# Patient Record
Sex: FEMALE | Race: WHITE | Employment: OTHER | URBAN - METROPOLITAN AREA
[De-identification: names, ages, dates, MRNs, and addresses within clinical notes are randomized per-mention and may not be internally consistent; named-entity substitution may affect disease eponyms.]

---

## 2017-01-06 ENCOUNTER — PATIENT MESSAGE (OUTPATIENT)
Dept: FAMILY MEDICINE CLINIC | Facility: CLINIC | Age: 77
End: 2017-01-06

## 2017-01-09 ENCOUNTER — TELEPHONE (OUTPATIENT)
Dept: FAMILY MEDICINE CLINIC | Facility: CLINIC | Age: 77
End: 2017-01-09

## 2017-01-09 NOTE — TELEPHONE ENCOUNTER
From: Melody Cherylene Mis  To: Jeronimo Bro MD  Sent: 1/6/2017 8:22 PM CST  Subject: Test Results Question    I really don't understand the recent Thyroid Peroxidase and Thyroglobulin Antibodies test. Would like someone to explain to me.  Do I need to se

## 2017-01-23 RX ORDER — LEVOTHYROXINE SODIUM 112 UG/1
TABLET ORAL
Qty: 90 TABLET | Refills: 1 | Status: CANCELLED | OUTPATIENT
Start: 2017-01-23

## 2017-01-23 NOTE — TELEPHONE ENCOUNTER
Thyroid Supplements Protocol Failed1/21 10:26 AM   TSH value between 0.350 and 5.500 IU/ml     Requesting Levothyroxine   LOV: 12/23/16  RTC: 6m   Last Labs: 12/31/16  Filled: 10/28/16 #90 with 0 refills    Future Appointments  Date Time Provider Allen Monte

## 2017-01-24 ENCOUNTER — NURSE ONLY (OUTPATIENT)
Dept: FAMILY MEDICINE CLINIC | Facility: CLINIC | Age: 77
End: 2017-01-24

## 2017-01-24 PROCEDURE — 96372 THER/PROPH/DIAG INJ SC/IM: CPT | Performed by: INTERNAL MEDICINE

## 2017-01-24 RX ORDER — CYANOCOBALAMIN 1000 UG/ML
1000 INJECTION INTRAMUSCULAR; SUBCUTANEOUS ONCE
Status: COMPLETED | OUTPATIENT
Start: 2017-01-24 | End: 2017-01-24

## 2017-01-24 RX ORDER — LEVOTHYROXINE SODIUM 112 UG/1
112 TABLET ORAL
Qty: 90 TABLET | Refills: 1 | Status: CANCELLED | OUTPATIENT
Start: 2017-01-24

## 2017-01-24 RX ADMIN — CYANOCOBALAMIN 1000 MCG: 1000 INJECTION INTRAMUSCULAR; SUBCUTANEOUS at 10:04:00

## 2017-01-24 NOTE — PROGRESS NOTES
4. Low vitamin B12 level, with fatigue, will start monthly B12 shots for 6 months    Received injection #1 on 12/23/16 at [address]  Scheduled for 2nd injection today    Future Appointments  Date Time Provider Anabell Shukla   1/24/2017 10:00 AM [facility] 20 NURSE E

## 2017-01-24 NOTE — PROGRESS NOTES
Patient is here for Vitamin B12 #2. Given IM to left deltoid without incident. Patient scheduled next appointment.

## 2017-01-27 RX ORDER — LEVOTHYROXINE SODIUM 112 UG/1
112 TABLET ORAL
Qty: 90 TABLET | Refills: 0 | Status: SHIPPED | OUTPATIENT
Start: 2017-01-27 | End: 2017-04-17

## 2017-02-23 NOTE — TELEPHONE ENCOUNTER
Requesting duloxetine  LOV: 12/23/16  RTC: 6 months  Last Labs: n/a  Filled: 6/9/16 #90 with 0 refills    Future Appointments  Date Time Provider Anabell Shukla   2/24/2017 10:00 AM [facility] 20 NURSE [facility] 20 [facility] [address]   6/23/2017 10:00 AM Marco Reno MD

## 2017-02-24 ENCOUNTER — NURSE ONLY (OUTPATIENT)
Dept: FAMILY MEDICINE CLINIC | Facility: CLINIC | Age: 77
End: 2017-02-24

## 2017-02-24 PROCEDURE — 96372 THER/PROPH/DIAG INJ SC/IM: CPT | Performed by: INTERNAL MEDICINE

## 2017-02-24 RX ORDER — CYANOCOBALAMIN 1000 UG/ML
1000 INJECTION INTRAMUSCULAR; SUBCUTANEOUS
Status: COMPLETED | OUTPATIENT
Start: 2017-02-24 | End: 2017-05-24

## 2017-02-24 RX ADMIN — CYANOCOBALAMIN 1000 MCG: 1000 INJECTION INTRAMUSCULAR; SUBCUTANEOUS at 10:16:00

## 2017-02-24 NOTE — PROGRESS NOTES
Nai Mcgowan RN at 1/24/2017  8:22 AM      Status: Signed : Nai Mcgowan RN (Registered Nurse)     Expand All Collapse All    4.  Low vitamin B12 level, with fatigue, will start monthly B12 shots for 6 months    Received injection #1 on 12/23/16

## 2017-03-01 ENCOUNTER — TELEPHONE (OUTPATIENT)
Dept: FAMILY MEDICINE CLINIC | Facility: CLINIC | Age: 77
End: 2017-03-01

## 2017-03-01 RX ORDER — DULOXETIN HYDROCHLORIDE 60 MG/1
CAPSULE, DELAYED RELEASE ORAL
Qty: 90 CAPSULE | Refills: 1 | Status: SHIPPED | OUTPATIENT
Start: 2017-03-01 | End: 2017-06-21

## 2017-03-01 NOTE — TELEPHONE ENCOUNTER
Singulex results dated 12/17/16. Low vitamin D level at 23. Start 50,000 units once a week x 6 months.   Future Appointments  Date Time Provider Anabell Shukla   3/24/2017 10:30 AM [facility] 20 NURSE EM[facility] 20 [facility] [address]   6/21/2017 10:40 AM Nathan Mckeon

## 2017-03-24 ENCOUNTER — NURSE ONLY (OUTPATIENT)
Dept: FAMILY MEDICINE CLINIC | Facility: CLINIC | Age: 77
End: 2017-03-24

## 2017-03-24 DIAGNOSIS — E53.8 LOW VITAMIN B12 LEVEL: Primary | ICD-10-CM

## 2017-03-24 PROCEDURE — 96372 THER/PROPH/DIAG INJ SC/IM: CPT | Performed by: INTERNAL MEDICINE

## 2017-03-24 RX ADMIN — CYANOCOBALAMIN 1000 MCG: 1000 INJECTION INTRAMUSCULAR; SUBCUTANEOUS at 10:25:00

## 2017-03-24 NOTE — PROGRESS NOTES
Progress Notes      Nai Galdamez RN at 2/24/2017  7:29 AM      Status: Signed : Deon Mejía RN (Registered Nurse)     Expand All Collapse All    Nai Galdamez RN at 1/24/2017  8:22 AM        Status: Signed  : Nai Galdamez RN (Regist

## 2017-04-17 RX ORDER — ATORVASTATIN CALCIUM 40 MG/1
TABLET, FILM COATED ORAL
Qty: 30 TABLET | Refills: 2 | Status: SHIPPED | OUTPATIENT
Start: 2017-04-17 | End: 2017-07-14

## 2017-04-17 NOTE — TELEPHONE ENCOUNTER
Cholesterol Medication Protocol Failed4/15 3:30 AM   Lipid panel within past 6 months     Requesting Atorvastatin   LOV: 12/23/16  RTC: 6 months   Last Labs: 12/19/16 singulex per media  Filled: 10/8/16 #90 with 1 refills    Future Appointments  Date Time

## 2017-04-18 RX ORDER — LEVOTHYROXINE SODIUM 112 UG/1
TABLET ORAL
Qty: 90 TABLET | Refills: 1 | Status: SHIPPED | OUTPATIENT
Start: 2017-04-18 | End: 2017-10-14

## 2017-04-18 NOTE — TELEPHONE ENCOUNTER
Requesting Levothyroxine  LOV: 12/23/16  RTC: 6 months  Last Labs: 12/17/16   Ref Range 12/17/16 10:11 AM       TSH 0.350-5.500 mIU/mL 0.087 (L)              Filled: 1/27/17 #90 with 0 refills    Future Appointments  Date Time Provider Anabell Shukla

## 2017-04-19 ENCOUNTER — PATIENT MESSAGE (OUTPATIENT)
Dept: FAMILY MEDICINE CLINIC | Facility: CLINIC | Age: 77
End: 2017-04-19

## 2017-04-19 RX ORDER — ATORVASTATIN CALCIUM 40 MG/1
TABLET, FILM COATED ORAL
Qty: 30 TABLET | Refills: 0 | OUTPATIENT
Start: 2017-04-19

## 2017-04-19 NOTE — PROGRESS NOTES
Status: Signed : Kayleen Marsh RN (Registered Nurse)     Expand All Collapse All    Progress Notes     Haylee Rodríguez April, RN at 2/24/2017  7:29 AM        Status: Signed  : Gavin January, April, RN (Registered Thuy Juan Expand All Collapse All

## 2017-04-19 NOTE — TELEPHONE ENCOUNTER
Requesting Atorvastatin  LOV: 12/23/16  RTC: 6 months  Last Labs: 12/17/16 singulex and cmp  Filled: 4/17/17 #30 with 2 refills    Future Appointments  Date Time Provider Anabell Shukla   4/24/2017 3:30 PM [facility] 20 NURSE EMG 20 [facility] [address]   6/21/2017 10

## 2017-04-20 NOTE — TELEPHONE ENCOUNTER
From: Sofiya Etienne Ours  To: Abel Rangel MD  Sent: 4/19/2017 6:13 PM CDT  Subject: Prescription Question    I don't know when Vishal Browne requested this prescription to be renewed. I haven't called yet. I have 4 pills left.  Please have Dr. John Santoyo to sign

## 2017-04-24 ENCOUNTER — NURSE ONLY (OUTPATIENT)
Dept: FAMILY MEDICINE CLINIC | Facility: CLINIC | Age: 77
End: 2017-04-24

## 2017-04-24 DIAGNOSIS — E53.8 LOW VITAMIN B12 LEVEL: Primary | ICD-10-CM

## 2017-04-24 PROCEDURE — 96372 THER/PROPH/DIAG INJ SC/IM: CPT | Performed by: INTERNAL MEDICINE

## 2017-04-24 RX ADMIN — CYANOCOBALAMIN 1000 MCG: 1000 INJECTION INTRAMUSCULAR; SUBCUTANEOUS at 15:10:00

## 2017-04-24 NOTE — PROGRESS NOTES
Patient is here for B12 injection. Given IM right deltoid. Patient tolerated well. All questions answered.

## 2017-05-24 ENCOUNTER — NURSE ONLY (OUTPATIENT)
Dept: FAMILY MEDICINE CLINIC | Facility: CLINIC | Age: 77
End: 2017-05-24

## 2017-05-24 DIAGNOSIS — E53.8 LOW VITAMIN B12 LEVEL: Primary | ICD-10-CM

## 2017-05-24 PROCEDURE — 96372 THER/PROPH/DIAG INJ SC/IM: CPT | Performed by: INTERNAL MEDICINE

## 2017-05-24 RX ADMIN — CYANOCOBALAMIN 1000 MCG: 1000 INJECTION INTRAMUSCULAR; SUBCUTANEOUS at 12:06:00

## 2017-05-24 NOTE — PROGRESS NOTES
Nai Gonzalez RN at 2/24/2017  7:29 AM          Status: Signed   : Nai Gonzalez RN (Registered Nurse)         Expand All Collapse Nai Lacey RN at 1/24/2017  8:22 AM            Status: Signed    : Nabil Luther RN (Registered

## 2017-05-24 NOTE — PROGRESS NOTES
Patient is here for B12 injection #6. Given IM right deltoid. Patient tolerated well. All questions answered.

## 2017-06-01 ENCOUNTER — MED REC SCAN ONLY (OUTPATIENT)
Dept: FAMILY MEDICINE CLINIC | Facility: CLINIC | Age: 77
End: 2017-06-01

## 2017-06-02 RX ORDER — AMLODIPINE BESYLATE 5 MG/1
TABLET ORAL
Qty: 90 TABLET | Refills: 0 | Status: SHIPPED | OUTPATIENT
Start: 2017-06-02 | End: 2017-09-10

## 2017-06-02 NOTE — TELEPHONE ENCOUNTER
Requesting Amlodipine   LOV: 12/23/16  RTC: 6m  Last Labs: pp  Filled: 12/6/16 #90 with 1 refills    Future Appointments  Date Time Provider Anabell Shukla   6/21/2017 10:40 AM Katy Cortes MD [facility] [address] [facility] [address]       Med refilled per protocol

## 2017-06-21 ENCOUNTER — LAB ENCOUNTER (OUTPATIENT)
Dept: LAB | Age: 77
End: 2017-06-21
Attending: INTERNAL MEDICINE
Payer: MEDICARE

## 2017-06-21 ENCOUNTER — PRIOR ORIGINAL RECORDS (OUTPATIENT)
Dept: OTHER | Age: 77
End: 2017-06-21

## 2017-06-21 ENCOUNTER — OFFICE VISIT (OUTPATIENT)
Dept: FAMILY MEDICINE CLINIC | Facility: CLINIC | Age: 77
End: 2017-06-21

## 2017-06-21 VITALS
HEIGHT: 62 IN | DIASTOLIC BLOOD PRESSURE: 80 MMHG | BODY MASS INDEX: 31.1 KG/M2 | WEIGHT: 169 LBS | SYSTOLIC BLOOD PRESSURE: 122 MMHG | RESPIRATION RATE: 16 BRPM | HEART RATE: 78 BPM

## 2017-06-21 DIAGNOSIS — E55.9 VITAMIN D DEFICIENCY: ICD-10-CM

## 2017-06-21 DIAGNOSIS — E78.00 HYPERCHOLESTEREMIA: ICD-10-CM

## 2017-06-21 DIAGNOSIS — E03.9 HYPOTHYROIDISM, UNSPECIFIED TYPE: ICD-10-CM

## 2017-06-21 DIAGNOSIS — E04.1 THYROID NODULE: ICD-10-CM

## 2017-06-21 DIAGNOSIS — E53.8 LOW VITAMIN B12 LEVEL: ICD-10-CM

## 2017-06-21 DIAGNOSIS — E53.8 LOW VITAMIN B12 LEVEL: Primary | ICD-10-CM

## 2017-06-21 DIAGNOSIS — E78.00 PURE HYPERCHOLESTEROLEMIA: Primary | ICD-10-CM

## 2017-06-21 PROCEDURE — 80053 COMPREHEN METABOLIC PANEL: CPT

## 2017-06-21 PROCEDURE — 99214 OFFICE O/P EST MOD 30 MIN: CPT | Performed by: INTERNAL MEDICINE

## 2017-06-21 PROCEDURE — 84443 ASSAY THYROID STIM HORMONE: CPT

## 2017-06-21 PROCEDURE — 36415 COLL VENOUS BLD VENIPUNCTURE: CPT

## 2017-06-21 NOTE — PROGRESS NOTES
CC: Patient presents with:  Medication Follow-Up       HPI:   1. Low vitamin B12 level  (primary encounter diagnosis), finished B12 shots  2. Vitamin D deficiency, finished vit D  3. Thyroid nodule, she feels fine  4.  Hypercholesteremia, doing well on pain    EXAM:   /80 mmHg  Pulse 78  Resp 16  Ht 62\"  Wt 169 lb  BMI 30.90 kg/m2  GENERAL: well nourished,in no apparent distress, A/O x3  HEENT: atraumatic, normocephalic, OP-clear, PERRLA  NECK: supple,no LAD   LUNGS: clear to auscultation bilatera

## 2017-06-29 ENCOUNTER — TELEPHONE (OUTPATIENT)
Dept: FAMILY MEDICINE CLINIC | Facility: CLINIC | Age: 77
End: 2017-06-29

## 2017-07-06 ENCOUNTER — TELEPHONE (OUTPATIENT)
Dept: FAMILY MEDICINE CLINIC | Facility: CLINIC | Age: 77
End: 2017-07-06

## 2017-07-06 RX ORDER — ERGOCALCIFEROL 1.25 MG/1
50000 CAPSULE ORAL
Qty: 24 CAPSULE | Refills: 1 | Status: SHIPPED | OUTPATIENT
Start: 2017-07-06 | End: 2017-11-02 | Stop reason: ALTCHOICE

## 2017-07-06 NOTE — TELEPHONE ENCOUNTER
Singulex results received. Per Dr. Raman Co: Low vitamin D. Vitamin D 50,000 units 2x weekly x 6 months. Pre diabetes.     Future Appointments  Date Time Provider Anabell Shukla   12/20/2017 10:40 AM Jacque Cartwright MD [facility] [address] [facility] [address]       Time started:

## 2017-07-06 NOTE — TELEPHONE ENCOUNTER
Time started: 1004    Time ended: 1007    Total time spent on chart: 3 min    See results below. Spoke with patient regarding test results. All questions and concerns answered. Pharmacy location confirmed with patient and order placed.   Explained results t

## 2017-07-10 RX ORDER — ERGOCALCIFEROL 1.25 MG/1
CAPSULE ORAL
Qty: 25 CAPSULE | Refills: 0 | OUTPATIENT
Start: 2017-07-10

## 2017-07-17 NOTE — TELEPHONE ENCOUNTER
Requesting atorvastain 40 mg   LOV: 6/21/17  RTC:   Last Labs:  Filled 4/17/17#30 with 2 refills    Future Appointments  Date Time Provider Anabell Shukla   12/20/2017 10:40 AM Rhonda Pickett MD [facility] 20 [facility] [address]     Refill protocol failed because the

## 2017-07-19 RX ORDER — ATORVASTATIN CALCIUM 40 MG/1
TABLET, FILM COATED ORAL
Qty: 30 TABLET | Refills: 1 | Status: SHIPPED | OUTPATIENT
Start: 2017-07-19 | End: 2017-08-15

## 2017-07-27 ENCOUNTER — PATIENT MESSAGE (OUTPATIENT)
Dept: FAMILY MEDICINE CLINIC | Facility: CLINIC | Age: 77
End: 2017-07-27

## 2017-07-27 NOTE — TELEPHONE ENCOUNTER
From: Sofiya Pa  To: Jacque Cartwright MD  Sent: 7/27/2017 10:29 AM CDT  Subject: Non-Urgent Medical Question    There was a note sent to indicate that I need to have Bone Scan done.  I just scheduled my Mammogram this morning and asked if there was

## 2017-08-01 ENCOUNTER — MED REC SCAN ONLY (OUTPATIENT)
Dept: FAMILY MEDICINE CLINIC | Facility: CLINIC | Age: 77
End: 2017-08-01

## 2017-08-15 ENCOUNTER — PATIENT MESSAGE (OUTPATIENT)
Dept: FAMILY MEDICINE CLINIC | Facility: CLINIC | Age: 77
End: 2017-08-15

## 2017-08-15 ENCOUNTER — HOSPITAL ENCOUNTER (OUTPATIENT)
Dept: MAMMOGRAPHY | Age: 77
Discharge: HOME | End: 2017-08-15
Attending: INTERNAL MEDICINE
Payer: MEDICARE

## 2017-08-15 DIAGNOSIS — Z12.31 ENCOUNTER FOR SCREENING MAMMOGRAM FOR MALIGNANT NEOPLASM OF BREAST: ICD-10-CM

## 2017-08-15 PROCEDURE — 77067 SCR MAMMO BI INCL CAD: CPT | Performed by: INTERNAL MEDICINE

## 2017-08-15 RX ORDER — ATORVASTATIN CALCIUM 40 MG/1
TABLET, FILM COATED ORAL
Qty: 90 TABLET | Refills: 1 | Status: SHIPPED | OUTPATIENT
Start: 2017-08-15 | End: 2018-02-27

## 2017-08-15 NOTE — TELEPHONE ENCOUNTER
From: Sofiya Gresham  To: Kasia Rodriguez MD  Sent: 8/15/2017 2:23 PM CDT  Subject: Non-Urgent Medical Question    I had Mammogram this morning at [facility]. When I scheduled this appt. they were to request order from you.  When I checked in th

## 2017-08-15 NOTE — TELEPHONE ENCOUNTER
Requesting Atorvastatin  LOV: 6/21/17  RTC: 6 months  Last Labs: 6/21/17  Filled: 7/1917 #30 with 1 refills    Future Appointments  Date Time Provider Anabell Shukla   12/20/2017 10:40 AM Quoc Joens MD [facility] 20 [facility] [address]       Medication passes prot

## 2017-08-28 RX ORDER — DULOXETIN HYDROCHLORIDE 60 MG/1
60 CAPSULE, DELAYED RELEASE ORAL
Qty: 90 CAPSULE | Refills: 0 | Status: CANCELLED
Start: 2017-08-28

## 2017-08-28 NOTE — TELEPHONE ENCOUNTER
Patient's spouse informed to have patient call us. Time started: 1205    Time ended: 1206    Total time spent on chart: one min.

## 2017-08-28 NOTE — TELEPHONE ENCOUNTER
From: Jaylon Arriaza  Sent: 8/28/2017 7:36 AM CDT  Subject: Medication Renewal Request    Sofiya Liao Chi would like a refill of the following medications:  DULOXETINE HCL 60 MG Oral Cap DR Ana Davalos MD]    [facility]erred pharmacy[facility]

## 2017-08-28 NOTE — TELEPHONE ENCOUNTER
Requesting Duloxetine   LOV: 6/21/17  RTC: 6 months   Last Labs: n/a   Filled: 6/9/16 #90 with 0 refills    Future Appointments  Date Time Provider Anabell Shukla   8/30/2017 10:00 AM [facility] PV ROOM 1 [facility] Efe Farmer   12/20/2017 10:40 AM Hafsa Nascimento

## 2017-08-30 ENCOUNTER — PRIOR ORIGINAL RECORDS (OUTPATIENT)
Dept: OTHER | Age: 77
End: 2017-08-30

## 2017-08-30 ENCOUNTER — HOSPITAL ENCOUNTER (OUTPATIENT)
Dept: CARDIOLOGY CLINIC | Facility: HOSPITAL | Age: 77
Discharge: HOME | End: 2017-08-30
Attending: INTERNAL MEDICINE

## 2017-08-30 DIAGNOSIS — I65.23 BILATERAL CAROTID ARTERY STENOSIS: ICD-10-CM

## 2017-08-31 RX ORDER — DULOXETIN HYDROCHLORIDE 60 MG/1
CAPSULE, DELAYED RELEASE ORAL
Qty: 90 CAPSULE | Refills: 1 | Status: SHIPPED | OUTPATIENT
Start: 2017-08-31 | End: 2018-02-22

## 2017-08-31 NOTE — TELEPHONE ENCOUNTER
Patient call office disgusted that she has not had her med refilled still. She has taken her last dose today and she does not understand why we keep messing this up. She has been taking the med daily although our last refill was 6/2016.   She explained th

## 2017-09-01 ENCOUNTER — PRIOR ORIGINAL RECORDS (OUTPATIENT)
Dept: OTHER | Age: 77
End: 2017-09-01

## 2017-09-06 ENCOUNTER — TELEPHONE (OUTPATIENT)
Dept: FAMILY MEDICINE CLINIC | Facility: CLINIC | Age: 77
End: 2017-09-06

## 2017-09-12 RX ORDER — AMLODIPINE BESYLATE 5 MG/1
TABLET ORAL
Qty: 90 TABLET | Refills: 1 | Status: SHIPPED | OUTPATIENT
Start: 2017-09-12 | End: 2018-02-22

## 2017-09-21 ENCOUNTER — PRIOR ORIGINAL RECORDS (OUTPATIENT)
Dept: OTHER | Age: 77
End: 2017-09-21

## 2017-09-21 LAB
ALBUMIN: 3.9 G/DL
ALKALINE PHOSPHATATE(ALK PHOS): 85 IU/L
ALT (SGPT): 24 U/L
APOLIPOPROTEIN(B): 58 MG/DL
AST (SGOT): 21 U/L
BILIRUBIN TOTAL: 0.5 MG/DL
BUN: 14 MG/DL
CALCIUM: 9.5 MG/DL
CHLORIDE: 106 MEQ/L
CHOLESTEROL, TOTAL: 154 MG/DL
CREATININE, SERUM: 0.71 MG/DL
GLUCOSE: 98 MG/DL
GLUCOSE: 98 MG/DL
HDL CHOLESTEROL: 79 MG/DL
HEMOGLOBIN A1C: 5.7 %
HOMOCYSTEINE: 8 MG
LDL CHOLESTEROL: 69 MG/DL
LIPOPROTEIN(A): 85 MG/DL
POTASSIUM, SERUM: 3.9 MEQ/L
PROTEIN, TOTAL: 7.8 G/DL
SGOT (AST): 21 IU/L
SGPT (ALT): 24 IU/L
SODIUM: 138 MEQ/L
THYROID STIMULATING HORMONE: 0.19 MLU/L
THYROID STIMULATING HORMONE: 0.19 MLU/L
TRIGLYCERIDES: 91 MG/DL
URIC ACID: 5.7 MG/DL

## 2017-09-27 LAB
HSCRP(TYPE Y): 0.7 YES
PROBNP: 169 PG/ML
VITAMIN D 25-OH: 21 NG/ML

## 2017-10-12 DIAGNOSIS — E04.1 THYROID NODULE: Primary | ICD-10-CM

## 2017-10-12 RX ORDER — LEVOTHYROXINE SODIUM 112 UG/1
TABLET ORAL
Qty: 90 TABLET | Refills: 1 | Status: CANCELLED | OUTPATIENT
Start: 2017-10-12

## 2017-10-17 NOTE — TELEPHONE ENCOUNTER
Requesting levothyroxine 112mcg   LOV: 6/21/17  RTC: 6 months  Last Labs: 6/21/17  Filled: 4/18/17 # 90 with 1 refills    Future Appointments  Date Time Provider Anabell Shukla   12/20/2017 10:30 AM Moy Chen MD EM[facility] 20 [facility] [address]

## 2017-10-18 RX ORDER — LEVOTHYROXINE SODIUM 112 UG/1
TABLET ORAL
Qty: 90 TABLET | Refills: 1 | Status: SHIPPED | OUTPATIENT
Start: 2017-10-18 | End: 2017-11-27

## 2017-10-20 ENCOUNTER — HOSPITAL ENCOUNTER (EMERGENCY)
Age: 77
Discharge: HOME | End: 2017-10-21
Attending: EMERGENCY MEDICINE
Payer: MEDICARE

## 2017-10-20 VITALS
DIASTOLIC BLOOD PRESSURE: 72 MMHG | OXYGEN SATURATION: 98 % | WEIGHT: 169 LBS | HEIGHT: 63 IN | HEART RATE: 72 BPM | SYSTOLIC BLOOD PRESSURE: 187 MMHG | BODY MASS INDEX: 29.95 KG/M2 | TEMPERATURE: 98 F | RESPIRATION RATE: 18 BRPM

## 2017-10-20 DIAGNOSIS — H66.91 RIGHT OTITIS MEDIA, UNSPECIFIED OTITIS MEDIA TYPE: Primary | ICD-10-CM

## 2017-10-20 PROCEDURE — 99283 EMERGENCY DEPT VISIT LOW MDM: CPT

## 2017-10-20 RX ORDER — AMOXICILLIN 500 MG/1
500 TABLET, FILM COATED ORAL EVERY 8 HOURS
Qty: 30 TABLET | Refills: 0 | Status: SHIPPED | OUTPATIENT
Start: 2017-10-20 | End: 2017-10-30

## 2017-10-21 NOTE — ED PROVIDER NOTES
Patient Seen in: THE [facility] Emergency Department In [place]    History   Patient presents with:  Ear Problem Pain (neurosensory): ringing of rt ear and pain    Stated Complaint:     HPI    This is a 56-year-old female presents with right ear pain.,  Sore Right TM is erythematous. Oropharynx is slightly injected. No cervical lymphadenopathy. Lungs: Clear to auscultation bilaterally with no rales, no retractions, and no wheezing. HEART:  Regular rate and rhythm. S1 and S2. No murmurs, no rubs or gallops.

## 2017-10-24 ENCOUNTER — MYAURORA ACCOUNT LINK (OUTPATIENT)
Dept: OTHER | Age: 77
End: 2017-10-24

## 2017-10-25 ENCOUNTER — PRIOR ORIGINAL RECORDS (OUTPATIENT)
Dept: OTHER | Age: 77
End: 2017-10-25

## 2017-11-02 ENCOUNTER — OFFICE VISIT (OUTPATIENT)
Dept: FAMILY MEDICINE CLINIC | Facility: CLINIC | Age: 77
End: 2017-11-02

## 2017-11-02 VITALS
SYSTOLIC BLOOD PRESSURE: 120 MMHG | WEIGHT: 170 LBS | BODY MASS INDEX: 31.28 KG/M2 | TEMPERATURE: 98 F | HEIGHT: 62 IN | HEART RATE: 76 BPM | DIASTOLIC BLOOD PRESSURE: 78 MMHG | RESPIRATION RATE: 16 BRPM

## 2017-11-02 DIAGNOSIS — E55.9 HYPOVITAMINOSIS D: ICD-10-CM

## 2017-11-02 DIAGNOSIS — E04.1 THYROID NODULE: Primary | ICD-10-CM

## 2017-11-02 DIAGNOSIS — E78.00 HYPERCHOLESTEREMIA: ICD-10-CM

## 2017-11-02 DIAGNOSIS — E03.9 HYPOTHYROIDISM, UNSPECIFIED TYPE: ICD-10-CM

## 2017-11-02 DIAGNOSIS — H66.91 RIGHT OTITIS MEDIA, UNSPECIFIED OTITIS MEDIA TYPE: ICD-10-CM

## 2017-11-02 DIAGNOSIS — R53.83 FATIGUE, UNSPECIFIED TYPE: ICD-10-CM

## 2017-11-02 DIAGNOSIS — I10 ESSENTIAL HYPERTENSION: ICD-10-CM

## 2017-11-02 PROCEDURE — 99214 OFFICE O/P EST MOD 30 MIN: CPT | Performed by: INTERNAL MEDICINE

## 2017-11-02 RX ORDER — MULTIVIT-MIN/IRON/FOLIC ACID/K 18-600-40
1 CAPSULE ORAL DAILY
COMMUNITY
End: 2018-05-14 | Stop reason: ALTCHOICE

## 2017-11-02 RX ORDER — CEFDINIR 300 MG/1
300 CAPSULE ORAL 2 TIMES DAILY
Qty: 14 CAPSULE | Refills: 0 | Status: SHIPPED | OUTPATIENT
Start: 2017-11-02 | End: 2017-11-14 | Stop reason: ALTCHOICE

## 2017-11-02 NOTE — PROGRESS NOTES
[facility] Internal Medicine Office Note  Chief Complaint:   Patient presents with:  Establish Care: former Dr. Alex Kolb patient. ER F/U: [facility] 10/20/17 for ringing/pain in her right ear. Today ear feels plugged.  Scheduled to see ENT Dr. Nik French Smokeless tobacco: Never Used                      Alcohol use:  Yes              Comment: rarely    Allergies:    Adhesive Tape             Latex                       Current Outpatient Prescriptions:  Cholecalciferol (VITAMIN distress. HENT:   Head: Normocephalic and atraumatic. Right Ear: Tympanic membrane is erythematous. No cerumen present  Left Ear: Tympanic membrane is not erythematous.  No cerumen present  Mouth/Throat: No oropharyngeal exudate or posterior oropharynge capsule (300 mg total) by mouth 2 (two) times daily.           Orders Placed This Encounter      Vitamin D, 25-Hydroxy [E]      Lipid Panel [E]      CBC W Differential W Platelet [E]      TSH and Free T4 [E]    Meds & Refills for this Visit:  Bob Marquez

## 2017-11-02 NOTE — PATIENT INSTRUCTIONS
Thank you for choosing Bakari Tan MD at [place] [id number]  To Do: [address]  1. Blood work in [address]  2. Call to schedule thyroid ultrasound    Effective 6/19/17 until November 2017  Due to [employer] is being moved.   It is effects or medication interactions.  It is your duty and for your safety to discuss with the pharmacist and our office with questions, and to notify us and stop treatment if problems arise, but know that our intention is that the benefits outweigh those po

## 2017-11-06 ENCOUNTER — PATIENT MESSAGE (OUTPATIENT)
Dept: FAMILY MEDICINE CLINIC | Facility: CLINIC | Age: 77
End: 2017-11-06

## 2017-11-06 NOTE — TELEPHONE ENCOUNTER
From: Sofiya Panchal  To:  Tatum Bridges MD  Sent: 11/6/2017 9:47 AM CST  Subject: Visit Follow-up Question    Could you confirm that I can have Thyroid Ultrasound before Nov. 18. Nov. 18, 2016 was last date that I had the test and not sure Medicare

## 2017-11-21 ENCOUNTER — HOSPITAL ENCOUNTER (OUTPATIENT)
Dept: ULTRASOUND IMAGING | Age: 77
Discharge: HOME | End: 2017-11-21
Attending: OTOLARYNGOLOGY
Payer: MEDICARE

## 2017-11-21 ENCOUNTER — LAB ENCOUNTER (OUTPATIENT)
Dept: LAB | Age: 77
End: 2017-11-21
Attending: OTOLARYNGOLOGY
Payer: MEDICARE

## 2017-11-21 DIAGNOSIS — E04.1 NONTOXIC THYROID NODULE: ICD-10-CM

## 2017-11-21 PROCEDURE — 84439 ASSAY OF FREE THYROXINE: CPT

## 2017-11-21 PROCEDURE — 84443 ASSAY THYROID STIM HORMONE: CPT

## 2017-11-21 PROCEDURE — 76536 US EXAM OF HEAD AND NECK: CPT | Performed by: OTOLARYNGOLOGY

## 2017-11-21 PROCEDURE — 36415 COLL VENOUS BLD VENIPUNCTURE: CPT

## 2017-11-27 NOTE — PROGRESS NOTES
Per Dali Lin MD, called and spoke to pt to inform of test results and recommendations. TSH is still low at 0.057 . Confirmed pt is currently taking levothyroxine 112 mcg.  We recommend to decrease to 100 mcg and repeat TSH/T4 in 6 weeks  Pt verbalized

## 2017-11-28 ENCOUNTER — PRIOR ORIGINAL RECORDS (OUTPATIENT)
Dept: OTHER | Age: 77
End: 2017-11-28

## 2017-11-28 NOTE — PROGRESS NOTES
Informed pt of results.  Confirmed pt's appt with Dr. Meet Traylor on 12/7/17 @ 2:00pm in our [place] office, pt v/u

## 2018-01-23 ENCOUNTER — LAB ENCOUNTER (OUTPATIENT)
Dept: LAB | Age: 78
End: 2018-01-23
Attending: OTOLARYNGOLOGY
Payer: MEDICARE

## 2018-01-23 DIAGNOSIS — E07.9 THYROID DISORDER: ICD-10-CM

## 2018-01-23 LAB
FREE T4: 1 NG/DL (ref 0.9–1.8)
TSI SER-ACNC: 0.45 MIU/ML (ref 0.35–5.5)

## 2018-01-23 PROCEDURE — 36415 COLL VENOUS BLD VENIPUNCTURE: CPT

## 2018-01-23 PROCEDURE — 84443 ASSAY THYROID STIM HORMONE: CPT

## 2018-01-23 PROCEDURE — 84439 ASSAY OF FREE THYROXINE: CPT

## 2018-01-24 NOTE — PROGRESS NOTES
Informed pt of results, pt confirmed she is taking 100mcg levothyroxine, placed order for repeat labs in 6 months

## 2018-02-17 RX ORDER — DULOXETIN HYDROCHLORIDE 60 MG/1
CAPSULE, DELAYED RELEASE ORAL
Qty: 90 CAPSULE | Refills: 0 | OUTPATIENT
Start: 2018-02-17

## 2018-02-19 RX ORDER — DULOXETIN HYDROCHLORIDE 60 MG/1
CAPSULE, DELAYED RELEASE ORAL
Qty: 90 CAPSULE | Refills: 0 | OUTPATIENT
Start: 2018-02-19

## 2018-02-22 RX ORDER — DULOXETIN HYDROCHLORIDE 60 MG/1
CAPSULE, DELAYED RELEASE ORAL
Qty: 90 CAPSULE | Refills: 0 | OUTPATIENT
Start: 2018-02-22

## 2018-03-05 ENCOUNTER — PRIOR ORIGINAL RECORDS (OUTPATIENT)
Dept: OTHER | Age: 78
End: 2018-03-05

## 2018-03-06 ENCOUNTER — HOSPITAL ENCOUNTER (OUTPATIENT)
Dept: CV DIAGNOSTICS | Facility: HOSPITAL | Age: 78
Discharge: HOME | End: 2018-03-06
Attending: INTERNAL MEDICINE
Payer: MEDICARE

## 2018-03-06 DIAGNOSIS — R55 SYNCOPE: ICD-10-CM

## 2018-03-06 PROCEDURE — 93227 XTRNL ECG REC<48 HR R&I: CPT | Performed by: INTERNAL MEDICINE

## 2018-03-06 PROCEDURE — 93225 XTRNL ECG REC<48 HRS REC: CPT | Performed by: INTERNAL MEDICINE

## 2018-03-06 PROCEDURE — 93226 XTRNL ECG REC<48 HR SCAN A/R: CPT | Performed by: INTERNAL MEDICINE

## 2018-03-16 ENCOUNTER — PRIOR ORIGINAL RECORDS (OUTPATIENT)
Dept: OTHER | Age: 78
End: 2018-03-16

## 2018-03-21 ENCOUNTER — PRIOR ORIGINAL RECORDS (OUTPATIENT)
Dept: OTHER | Age: 78
End: 2018-03-21

## 2018-03-26 ENCOUNTER — HOSPITAL ENCOUNTER (OUTPATIENT)
Dept: CV DIAGNOSTICS | Facility: HOSPITAL | Age: 78
Discharge: HOME | End: 2018-03-26
Attending: INTERNAL MEDICINE
Payer: MEDICARE

## 2018-03-26 ENCOUNTER — HOSPITAL ENCOUNTER (OUTPATIENT)
Dept: CV DIAGNOSTICS | Facility: HOSPITAL | Age: 78
Discharge: HOME | End: 2018-03-26
Attending: INTERNAL MEDICINE

## 2018-03-26 ENCOUNTER — MYAURORA ACCOUNT LINK (OUTPATIENT)
Dept: OTHER | Age: 78
End: 2018-03-26

## 2018-03-26 DIAGNOSIS — I47.2 VENTRICULAR TACHYCARDIA (PAROXYSMAL) (HCC): ICD-10-CM

## 2018-03-26 DIAGNOSIS — I47.2 VENTRICULAR TACHYCARDIA (HCC): ICD-10-CM

## 2018-03-26 DIAGNOSIS — R07.9 CHEST PAIN, UNSPECIFIED TYPE: ICD-10-CM

## 2018-03-26 DIAGNOSIS — R07.89 CHEST DISCOMFORT: ICD-10-CM

## 2018-03-26 DIAGNOSIS — R55 SYNCOPE, UNSPECIFIED SYNCOPE TYPE: ICD-10-CM

## 2018-03-26 PROCEDURE — 93017 CV STRESS TEST TRACING ONLY: CPT | Performed by: INTERNAL MEDICINE

## 2018-03-26 PROCEDURE — 93018 CV STRESS TEST I&R ONLY: CPT | Performed by: INTERNAL MEDICINE

## 2018-03-26 PROCEDURE — 78452 HT MUSCLE IMAGE SPECT MULT: CPT | Performed by: INTERNAL MEDICINE

## 2018-03-30 ENCOUNTER — PRIOR ORIGINAL RECORDS (OUTPATIENT)
Dept: OTHER | Age: 78
End: 2018-03-30

## 2018-04-06 ENCOUNTER — PRIOR ORIGINAL RECORDS (OUTPATIENT)
Dept: OTHER | Age: 78
End: 2018-04-06

## 2018-04-11 ENCOUNTER — PRIOR ORIGINAL RECORDS (OUTPATIENT)
Dept: OTHER | Age: 78
End: 2018-04-11

## 2018-04-21 ENCOUNTER — HOSPITAL ENCOUNTER (OUTPATIENT)
Dept: CV DIAGNOSTICS | Facility: HOSPITAL | Age: 78
Discharge: HOME | End: 2018-04-21
Attending: INTERNAL MEDICINE
Payer: MEDICARE

## 2018-04-21 DIAGNOSIS — R00.2 PALPITATIONS: ICD-10-CM

## 2018-04-21 DIAGNOSIS — I47.2 PAROXYSMAL VENTRICULAR TACHYCARDIA (HCC): ICD-10-CM

## 2018-04-21 PROCEDURE — 93270 REMOTE 30 DAY ECG REV/REPORT: CPT | Performed by: INTERNAL MEDICINE

## 2018-04-21 PROCEDURE — 93272 ECG/REVIEW INTERPRET ONLY: CPT | Performed by: INTERNAL MEDICINE

## 2018-04-21 PROCEDURE — 93271 ECG/MONITORING AND ANALYSIS: CPT | Performed by: INTERNAL MEDICINE

## 2018-04-23 ENCOUNTER — PRIOR ORIGINAL RECORDS (OUTPATIENT)
Dept: OTHER | Age: 78
End: 2018-04-23

## 2018-04-25 ENCOUNTER — HOSPITAL (OUTPATIENT)
Dept: OTHER | Age: 78
End: 2018-04-25
Attending: INTERNAL MEDICINE

## 2018-04-25 LAB — CREATININE, POC: 0.8 MG/DL (ref 0.51–0.95)

## 2018-05-10 ENCOUNTER — PRIOR ORIGINAL RECORDS (OUTPATIENT)
Dept: OTHER | Age: 78
End: 2018-05-10

## 2018-05-11 ENCOUNTER — PRIOR ORIGINAL RECORDS (OUTPATIENT)
Dept: OTHER | Age: 78
End: 2018-05-11

## 2018-05-12 ENCOUNTER — PRIOR ORIGINAL RECORDS (OUTPATIENT)
Dept: OTHER | Age: 78
End: 2018-05-12

## 2018-05-12 ENCOUNTER — LAB ENCOUNTER (OUTPATIENT)
Dept: LAB | Age: 78
End: 2018-05-12
Attending: INTERNAL MEDICINE
Payer: MEDICARE

## 2018-05-12 DIAGNOSIS — I42.8 OBSCURE CARDIOMYOPATHY OF AFRICA (HCC): ICD-10-CM

## 2018-05-12 DIAGNOSIS — R00.2 PALPITATIONS: ICD-10-CM

## 2018-05-12 DIAGNOSIS — I47.2 PAROXYSMAL VENTRICULAR TACHYCARDIA (HCC): Primary | ICD-10-CM

## 2018-05-12 PROCEDURE — 80048 BASIC METABOLIC PNL TOTAL CA: CPT

## 2018-05-12 PROCEDURE — 36415 COLL VENOUS BLD VENIPUNCTURE: CPT

## 2018-05-12 PROCEDURE — 85025 COMPLETE CBC W/AUTO DIFF WBC: CPT

## 2018-05-14 RX ORDER — METOPROLOL SUCCINATE 50 MG/1
50 TABLET, EXTENDED RELEASE ORAL 2 TIMES DAILY
COMMUNITY
End: 2018-07-11 | Stop reason: ALTCHOICE

## 2018-05-14 RX ORDER — VALSARTAN 80 MG/1
80 TABLET ORAL NIGHTLY
COMMUNITY
End: 2018-07-11 | Stop reason: ALTCHOICE

## 2018-05-14 NOTE — HISTORICAL OFFICE NOTE
Missael Fenton  : 1940  ACCOUNT:  [id number]  [id number]  PCP: Dr. Patten Courser     TODAY'S DATE: 2018  DICTATED BY:  [Dr. Kelle Larose: [Followup of Syncope and Followup of Ventricular tachycardia.]    HPI:    [On  denies difficulties with hearing, otherwise negative. CV: see HPI; denies claudication. RESP: denies chronic cough, hemoptysis. GI: denies melena, hematochezia. : no hematuria. INTEG: no new rashes, lesions. MS: joint pain in katie toes & hands.  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    ASSESSMENT:  1. Abnormal UFCT  2. Carotid artery stenosis, bilateral  3. Chest discomfort/tightness  4. Hypercholesteremia, pure  5. Hypertension, benign  6.  O

## 2018-05-14 NOTE — H&P
JEANETH HILL  : 1940  ACCOUNT:  [id number]  [id number]  PCP: Dr. Devin White     TODAY'S DATE: 05/10/2018  DICTATED BY:  [Dr. Bethany Nash      CHIEF COMPLAINT: [Followup of Other Cardiomyopathies.]    HPI:    [On 05/10/2018, Jeaneth WHITE hematochezia. : no hematuria. INTEG: no new rashes, lesions. MS: no limiting arthritis. NEURO: no localized deficits. HEM/LYMPH: denies easy bruising. ALL: no new food or enviornmental allergies.       PAST HISTORY: hypothyroidism, spinal stenosis    PAST I brought Dr. Quita Mroin in the room and we both told the patient that she only needed to schedule the procedure. She voiced understanding and agreement with the plan.   I have also added valsartan 80 mg nightly to her medical regimen to improve the control of h

## 2018-05-16 ENCOUNTER — APPOINTMENT (OUTPATIENT)
Dept: GENERAL RADIOLOGY | Facility: HOSPITAL | Age: 78
End: 2018-05-16
Attending: INTERNAL MEDICINE
Payer: MEDICARE

## 2018-05-16 ENCOUNTER — HOSPITAL ENCOUNTER (OUTPATIENT)
Dept: INTERVENTIONAL RADIOLOGY/VASCULAR | Facility: HOSPITAL | Age: 78
Discharge: HOME | End: 2018-05-17
Attending: INTERNAL MEDICINE | Admitting: INTERNAL MEDICINE
Payer: MEDICARE

## 2018-05-16 DIAGNOSIS — I47.2 V-TACH (HCC): ICD-10-CM

## 2018-05-16 DIAGNOSIS — I10 HTN (HYPERTENSION): ICD-10-CM

## 2018-05-16 DIAGNOSIS — I42.9 CARDIOMYOPATHY (HCC): ICD-10-CM

## 2018-05-16 PROCEDURE — B51N1ZZ FLUOROSCOPY OF LEFT UPPER EXTREMITY VEINS USING LOW OSMOLAR CONTRAST: ICD-10-PCS | Performed by: INTERNAL MEDICINE

## 2018-05-16 PROCEDURE — 02HK3KZ INSERTION OF DEFIBRILLATOR LEAD INTO RIGHT VENTRICLE, PERCUTANEOUS APPROACH: ICD-10-PCS | Performed by: INTERNAL MEDICINE

## 2018-05-16 PROCEDURE — 71045 X-RAY EXAM CHEST 1 VIEW: CPT | Performed by: INTERNAL MEDICINE

## 2018-05-16 PROCEDURE — 99152 MOD SED SAME PHYS/QHP 5/>YRS: CPT

## 2018-05-16 PROCEDURE — 96375 TX/PRO/DX INJ NEW DRUG ADDON: CPT | Performed by: NURSE PRACTITIONER

## 2018-05-16 PROCEDURE — 0JH608Z INSERTION OF DEFIBRILLATOR GENERATOR INTO CHEST SUBCUTANEOUS TISSUE AND FASCIA, OPEN APPROACH: ICD-10-PCS | Performed by: INTERNAL MEDICINE

## 2018-05-16 PROCEDURE — 99153 MOD SED SAME PHYS/QHP EA: CPT

## 2018-05-16 PROCEDURE — 33249 INSJ/RPLCMT DEFIB W/LEAD(S): CPT

## 2018-05-16 PROCEDURE — 02H63KZ INSERTION OF DEFIBRILLATOR LEAD INTO RIGHT ATRIUM, PERCUTANEOUS APPROACH: ICD-10-PCS | Performed by: INTERNAL MEDICINE

## 2018-05-16 RX ORDER — ACETAMINOPHEN AND CODEINE PHOSPHATE 300; 30 MG/1; MG/1
1 TABLET ORAL EVERY 4 HOURS PRN
Status: DISCONTINUED | OUTPATIENT
Start: 2018-05-16 | End: 2018-05-17

## 2018-05-16 RX ORDER — METOPROLOL SUCCINATE 50 MG/1
50 TABLET, EXTENDED RELEASE ORAL 2 TIMES DAILY
Status: DISCONTINUED | OUTPATIENT
Start: 2018-05-16 | End: 2018-05-17

## 2018-05-16 RX ORDER — ASPIRIN 81 MG/1
81 TABLET ORAL DAILY
Status: DISCONTINUED | OUTPATIENT
Start: 2018-05-17 | End: 2018-05-17

## 2018-05-16 RX ORDER — ATORVASTATIN CALCIUM 40 MG/1
40 TABLET, FILM COATED ORAL NIGHTLY
Status: DISCONTINUED | OUTPATIENT
Start: 2018-05-16 | End: 2018-05-17

## 2018-05-16 RX ORDER — DIPHENHYDRAMINE HYDROCHLORIDE 50 MG/ML
INJECTION INTRAMUSCULAR; INTRAVENOUS
Status: COMPLETED
Start: 2018-05-16 | End: 2018-05-16

## 2018-05-16 RX ORDER — ONDANSETRON 2 MG/ML
4 INJECTION INTRAMUSCULAR; INTRAVENOUS EVERY 6 HOURS PRN
Status: DISCONTINUED | OUTPATIENT
Start: 2018-05-16 | End: 2018-05-17

## 2018-05-16 RX ORDER — BACITRACIN 50000 [USP'U]/1
INJECTION, POWDER, LYOPHILIZED, FOR SOLUTION INTRAMUSCULAR
Status: COMPLETED
Start: 2018-05-16 | End: 2018-05-16

## 2018-05-16 RX ORDER — LOSARTAN POTASSIUM 50 MG/1
50 TABLET ORAL DAILY
Status: DISCONTINUED | OUTPATIENT
Start: 2018-05-16 | End: 2018-05-17

## 2018-05-16 RX ORDER — CEFAZOLIN SODIUM/WATER 2 G/20 ML
2 SYRINGE (ML) INTRAVENOUS
Status: DISCONTINUED | OUTPATIENT
Start: 2018-05-16 | End: 2018-05-16 | Stop reason: HOSPADM

## 2018-05-16 RX ORDER — ACETAMINOPHEN AND CODEINE PHOSPHATE 300; 30 MG/1; MG/1
2 TABLET ORAL EVERY 4 HOURS PRN
Status: DISCONTINUED | OUTPATIENT
Start: 2018-05-16 | End: 2018-05-17

## 2018-05-16 RX ORDER — CEFAZOLIN SODIUM/WATER 2 G/20 ML
2 SYRINGE (ML) INTRAVENOUS EVERY 8 HOURS
Status: COMPLETED | OUTPATIENT
Start: 2018-05-16 | End: 2018-05-17

## 2018-05-16 RX ORDER — SODIUM CHLORIDE 9 MG/ML
INJECTION, SOLUTION INTRAVENOUS CONTINUOUS
Status: DISCONTINUED | OUTPATIENT
Start: 2018-05-16 | End: 2018-05-17

## 2018-05-16 RX ORDER — CEFAZOLIN SODIUM/WATER 2 G/20 ML
SYRINGE (ML) INTRAVENOUS
Status: COMPLETED
Start: 2018-05-16 | End: 2018-05-16

## 2018-05-16 RX ORDER — LIDOCAINE HYDROCHLORIDE 10 MG/ML
INJECTION, SOLUTION EPIDURAL; INFILTRATION; INTRACAUDAL; PERINEURAL
Status: COMPLETED
Start: 2018-05-16 | End: 2018-05-16

## 2018-05-16 RX ORDER — MIDAZOLAM HYDROCHLORIDE 1 MG/ML
INJECTION INTRAMUSCULAR; INTRAVENOUS
Status: COMPLETED
Start: 2018-05-16 | End: 2018-05-16

## 2018-05-16 RX ORDER — ACETAMINOPHEN 325 MG/1
650 TABLET ORAL EVERY 6 HOURS PRN
Status: DISCONTINUED | OUTPATIENT
Start: 2018-05-16 | End: 2018-05-17

## 2018-05-16 RX ORDER — LEVOTHYROXINE SODIUM 0.1 MG/1
100 TABLET ORAL
Status: DISCONTINUED | OUTPATIENT
Start: 2018-05-16 | End: 2018-05-17

## 2018-05-16 RX ORDER — DULOXETIN HYDROCHLORIDE 30 MG/1
60 CAPSULE, DELAYED RELEASE ORAL
Status: DISCONTINUED | OUTPATIENT
Start: 2018-05-17 | End: 2018-05-17

## 2018-05-16 RX ADMIN — ACETAMINOPHEN AND CODEINE PHOSPHATE 2 TABLET: 300; 30 TABLET ORAL at 13:21:00

## 2018-05-16 RX ADMIN — ACETAMINOPHEN AND CODEINE PHOSPHATE 2 TABLET: 300; 30 TABLET ORAL at 17:28:00

## 2018-05-16 RX ADMIN — METOPROLOL SUCCINATE 50 MG: 50 TABLET, EXTENDED RELEASE ORAL at 17:28:00

## 2018-05-16 RX ADMIN — ACETAMINOPHEN AND CODEINE PHOSPHATE 2 TABLET: 300; 30 TABLET ORAL at 22:46:00

## 2018-05-16 RX ADMIN — ATORVASTATIN CALCIUM 40 MG: 40 TABLET, FILM COATED ORAL at 20:36:00

## 2018-05-16 RX ADMIN — LOSARTAN POTASSIUM 50 MG: 50 TABLET ORAL at 20:36:00

## 2018-05-16 RX ADMIN — CEFAZOLIN SODIUM/WATER 2 G: 2 G/20 ML SYRINGE (ML) INTRAVENOUS at 18:48:00

## 2018-05-16 RX ADMIN — SODIUM CHLORIDE: 9 INJECTION, SOLUTION INTRAVENOUS at 09:30:00

## 2018-05-16 NOTE — PROCEDURES
OPERATION(S) PERFORMED:   1. Dual chamber ICD implant. 2. Chest fluoroscopy.    3.  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 The left arm was placed in a sling and the patient was transported to telemetry in stable condition. IMPLANTED DEVICE:   1.  Pulse generator:  [address] [place] [address] # [id number], serial number [id number]  2.   Active fixation ASHOK leger

## 2018-05-17 ENCOUNTER — APPOINTMENT (OUTPATIENT)
Dept: GENERAL RADIOLOGY | Facility: HOSPITAL | Age: 78
End: 2018-05-17
Attending: INTERNAL MEDICINE
Payer: MEDICARE

## 2018-05-17 VITALS
BODY MASS INDEX: 30.12 KG/M2 | RESPIRATION RATE: 18 BRPM | DIASTOLIC BLOOD PRESSURE: 74 MMHG | SYSTOLIC BLOOD PRESSURE: 141 MMHG | HEART RATE: 60 BPM | OXYGEN SATURATION: 97 % | TEMPERATURE: 98 F | WEIGHT: 170 LBS | HEIGHT: 63 IN

## 2018-05-17 PROCEDURE — 71046 X-RAY EXAM CHEST 2 VIEWS: CPT | Performed by: INTERNAL MEDICINE

## 2018-05-17 RX ADMIN — LEVOTHYROXINE SODIUM 100 MCG: 0.1 TABLET ORAL at 06:29:00

## 2018-05-17 RX ADMIN — DULOXETIN HYDROCHLORIDE 60 MG: 30 CAPSULE, DELAYED RELEASE ORAL at 08:52:00

## 2018-05-17 RX ADMIN — METOPROLOL SUCCINATE 50 MG: 50 TABLET, EXTENDED RELEASE ORAL at 06:29:00

## 2018-05-17 RX ADMIN — ASPIRIN 81 MG: 81 TABLET ORAL at 08:52:00

## 2018-05-17 RX ADMIN — CEFAZOLIN SODIUM/WATER 2 G: 2 G/20 ML SYRINGE (ML) INTRAVENOUS at 03:30:00

## 2018-05-17 RX ADMIN — ACETAMINOPHEN AND CODEINE PHOSPHATE 2 TABLET: 300; 30 TABLET ORAL at 11:03:00

## 2018-05-17 NOTE — PROGRESS NOTES
[facility]  Progress Note    [address] Patient Status:  Outpatient in a Bed    1940 MRN [id number]   [facility] 2NE-A Attending Guillermina Bianchi MD   Hosp Day # 0 PCP Ethan Thomas MD     Assessment:  1.   LV noncompaction Daily     • sodium chloride Stopped (05/16/18 1259)       Juice Meier MD  5/17/2018  9:42 AM

## 2018-05-17 NOTE — PROGRESS NOTES
05/17/18 1102   Clinical Encounter Type   Visited With Patient   Routine Visit Discharge   Per consult,  invited patient into an Advanced Directive conversation. The patient was educated regarding Orysia Section.  The patient do not want to complete it at

## 2018-05-17 NOTE — PLAN OF CARE
Problem: Patient/Family Goals  Goal: Patient/Family Long Term Goal  Patient's Long Term Goal: Prevention of infection on the incision site    Interventions:  - completed antibiotic  - monitor for s/s of infection  - discharge instructions  -f/u with MD  - ADULT  Goal: Incision(s), wounds(s) or drain site(s) healing without S/S of infection  INTERVENTIONS:  - Assess and document risk factors for pressure ulcer development  - Assess and document skin integrity  - Assess and document dressing/incision, wound b

## 2018-05-18 ENCOUNTER — EXTERNAL RECORD (OUTPATIENT)
Dept: HEALTH INFORMATION MANAGEMENT | Facility: OTHER | Age: 78
End: 2018-05-18

## 2018-05-24 ENCOUNTER — MYAURORA ACCOUNT LINK (OUTPATIENT)
Dept: OTHER | Age: 78
End: 2018-05-24

## 2018-05-24 ENCOUNTER — PRIOR ORIGINAL RECORDS (OUTPATIENT)
Dept: OTHER | Age: 78
End: 2018-05-24

## 2018-05-25 ENCOUNTER — PRIOR ORIGINAL RECORDS (OUTPATIENT)
Dept: OTHER | Age: 78
End: 2018-05-25

## 2018-05-29 ENCOUNTER — PRIOR ORIGINAL RECORDS (OUTPATIENT)
Dept: OTHER | Age: 78
End: 2018-05-29

## 2018-06-07 ENCOUNTER — PRIOR ORIGINAL RECORDS (OUTPATIENT)
Dept: OTHER | Age: 78
End: 2018-06-07

## 2018-06-07 ENCOUNTER — LAB ENCOUNTER (OUTPATIENT)
Dept: LAB | Age: 78
End: 2018-06-07
Attending: INTERNAL MEDICINE
Payer: MEDICARE

## 2018-06-07 DIAGNOSIS — I65.23 BILATERAL CAROTID ARTERY STENOSIS: ICD-10-CM

## 2018-06-07 DIAGNOSIS — I10 ESSENTIAL HYPERTENSION, MALIGNANT: ICD-10-CM

## 2018-06-07 DIAGNOSIS — I42.8 OBSCURE CARDIOMYOPATHY OF AFRICA (HCC): Primary | ICD-10-CM

## 2018-06-07 DIAGNOSIS — Z95.0 PACEMAKER: ICD-10-CM

## 2018-06-07 DIAGNOSIS — E78.00 PURE HYPERCHOLESTEROLEMIA: ICD-10-CM

## 2018-06-07 DIAGNOSIS — E11.9 DM (DIABETES MELLITUS) (HCC): ICD-10-CM

## 2018-06-07 DIAGNOSIS — R55 SYNCOPE AND COLLAPSE: ICD-10-CM

## 2018-06-07 DIAGNOSIS — R94.30 NONSPECIFIC ABNORMAL FUNCTION STUDY, CARDIOVASCULAR: ICD-10-CM

## 2018-06-07 DIAGNOSIS — R00.2 PALPITATIONS: ICD-10-CM

## 2018-06-07 DIAGNOSIS — I83.813 VARICOSE VEINS OF BOTH LOWER EXTREMITIES WITH PAIN: ICD-10-CM

## 2018-06-07 DIAGNOSIS — R07.89 OTHER CHEST PAIN: ICD-10-CM

## 2018-06-07 LAB
BUN: 14 MG/DL
CALCIUM: 8.9 MG/DL
CHLORIDE: 109 MEQ/L
CREATININE, SERUM: 0.81 MG/DL
GLUCOSE: 101 MG/DL
HEMATOCRIT: 42.4 %
HEMOGLOBIN: 14.4 G/DL
PLATELETS: 271 K/UL
POTASSIUM, SERUM: 4.1 MEQ/L
RED BLOOD COUNT: 4.49 X 10-6/U
SODIUM: 141 MEQ/L
WHITE BLOOD COUNT: 7.4 X 10-3/U

## 2018-06-07 PROCEDURE — 83036 HEMOGLOBIN GLYCOSYLATED A1C: CPT

## 2018-06-07 PROCEDURE — 80053 COMPREHEN METABOLIC PANEL: CPT

## 2018-06-07 PROCEDURE — 80061 LIPID PANEL: CPT

## 2018-06-07 PROCEDURE — 83880 ASSAY OF NATRIURETIC PEPTIDE: CPT

## 2018-06-07 PROCEDURE — 84443 ASSAY THYROID STIM HORMONE: CPT

## 2018-06-07 PROCEDURE — 36415 COLL VENOUS BLD VENIPUNCTURE: CPT

## 2018-06-07 PROCEDURE — 85025 COMPLETE CBC W/AUTO DIFF WBC: CPT

## 2018-06-13 ENCOUNTER — PRIOR ORIGINAL RECORDS (OUTPATIENT)
Dept: OTHER | Age: 78
End: 2018-06-13

## 2018-06-14 ENCOUNTER — PRIOR ORIGINAL RECORDS (OUTPATIENT)
Dept: OTHER | Age: 78
End: 2018-06-14

## 2018-06-15 ENCOUNTER — PRIOR ORIGINAL RECORDS (OUTPATIENT)
Dept: OTHER | Age: 78
End: 2018-06-15

## 2018-06-20 ENCOUNTER — PRIOR ORIGINAL RECORDS (OUTPATIENT)
Dept: OTHER | Age: 78
End: 2018-06-20

## 2018-06-22 ENCOUNTER — PRIOR ORIGINAL RECORDS (OUTPATIENT)
Dept: OTHER | Age: 78
End: 2018-06-22

## 2018-06-26 ENCOUNTER — OFFICE VISIT (OUTPATIENT)
Dept: SLEEP CENTER | Facility: HOSPITAL | Age: 78
End: 2018-06-26
Attending: INTERNAL MEDICINE
Payer: MEDICARE

## 2018-06-26 DIAGNOSIS — R06.83 SNORING: ICD-10-CM

## 2018-06-26 PROCEDURE — 95810 POLYSOM 6/> YRS 4/> PARAM: CPT

## 2018-06-28 ENCOUNTER — PRIOR ORIGINAL RECORDS (OUTPATIENT)
Dept: OTHER | Age: 78
End: 2018-06-28

## 2018-06-28 LAB
ALT (SGPT): 19 U/L
ALT (SGPT): 19 U/L
AST (SGOT): 20 U/L
AST (SGOT): 20 U/L
BNP: 1658 PMOL/L
CHOLESTEROL, TOTAL: 140 MG/DL
HDL CHOLESTEROL: 59 MG/DL
LDL CHOLESTEROL: 63 MG/DL
NON-HDL CHOLESTEROL: 81 MG/DL
PLATELETS: 246 K/UL
RED BLOOD COUNT: 4.41 X 10-6/U
THYROID STIMULATING HORMONE: 2.11 MLU/L
TRIGLYCERIDES: 91 MG/DL
WHITE BLOOD COUNT: 8.2 X 10-3/U

## 2018-06-29 NOTE — PROCEDURES
[address]       Accredited by the [facility] of Sleep Medicine (AASM)    PATIENT'S NAME:        JEANETH Ibanez  ATTENDING PHYSICIAN:   Duglas Davis M.D.   REFERRING PHYSICIAN:   PRISCILLA Acevedo resistance. Only 26 apneas and hypopneas were detected, all of which were obstructive in nature (no central apneas or hypopneas). This corresponds to an apnea-hypopnea index of 4.0 which is not significantly elevated. The oxygen chris was 85%.   The lio

## 2018-07-02 ENCOUNTER — PRIOR ORIGINAL RECORDS (OUTPATIENT)
Dept: OTHER | Age: 78
End: 2018-07-02

## 2018-07-10 ENCOUNTER — PRIOR ORIGINAL RECORDS (OUTPATIENT)
Dept: OTHER | Age: 78
End: 2018-07-10

## 2018-07-11 PROBLEM — I42.9 CARDIOMYOPATHY (HCC): Status: ACTIVE | Noted: 2018-07-11

## 2018-07-17 ENCOUNTER — MYAURORA ACCOUNT LINK (OUTPATIENT)
Dept: OTHER | Age: 78
End: 2018-07-17

## 2018-07-17 ENCOUNTER — PRIOR ORIGINAL RECORDS (OUTPATIENT)
Dept: OTHER | Age: 78
End: 2018-07-17

## 2018-07-23 ENCOUNTER — PRIOR ORIGINAL RECORDS (OUTPATIENT)
Dept: OTHER | Age: 78
End: 2018-07-23

## 2018-07-27 ENCOUNTER — PRIOR ORIGINAL RECORDS (OUTPATIENT)
Dept: OTHER | Age: 78
End: 2018-07-27

## 2018-08-21 ENCOUNTER — HOSPITAL ENCOUNTER (OUTPATIENT)
Dept: BONE DENSITY | Age: 78
Discharge: HOME | End: 2018-08-21
Attending: INTERNAL MEDICINE
Payer: MEDICARE

## 2018-08-21 ENCOUNTER — HOSPITAL ENCOUNTER (OUTPATIENT)
Dept: MAMMOGRAPHY | Age: 78
Discharge: HOME | End: 2018-08-21
Attending: INTERNAL MEDICINE
Payer: MEDICARE

## 2018-08-21 DIAGNOSIS — Z12.39 BREAST CANCER SCREENING: ICD-10-CM

## 2018-08-21 DIAGNOSIS — Z78.0 MENOPAUSE: ICD-10-CM

## 2018-08-21 PROCEDURE — 77067 SCR MAMMO BI INCL CAD: CPT | Performed by: INTERNAL MEDICINE

## 2018-08-21 PROCEDURE — 77063 BREAST TOMOSYNTHESIS BI: CPT | Performed by: INTERNAL MEDICINE

## 2018-08-21 PROCEDURE — 77080 DXA BONE DENSITY AXIAL: CPT | Performed by: INTERNAL MEDICINE

## 2018-08-30 ENCOUNTER — PRIOR ORIGINAL RECORDS (OUTPATIENT)
Dept: OTHER | Age: 78
End: 2018-08-30

## 2018-09-04 ENCOUNTER — PRIOR ORIGINAL RECORDS (OUTPATIENT)
Dept: OTHER | Age: 78
End: 2018-09-04

## 2018-09-12 ENCOUNTER — LAB ENCOUNTER (OUTPATIENT)
Dept: LAB | Age: 78
End: 2018-09-12
Attending: INTERNAL MEDICINE
Payer: MEDICARE

## 2018-09-12 ENCOUNTER — PRIOR ORIGINAL RECORDS (OUTPATIENT)
Dept: OTHER | Age: 78
End: 2018-09-12

## 2018-09-12 DIAGNOSIS — I42.8 OBSCURE CARDIOMYOPATHY OF AFRICA (HCC): ICD-10-CM

## 2018-09-12 DIAGNOSIS — E78.00 PURE HYPERCHOLESTEROLEMIA: ICD-10-CM

## 2018-09-12 DIAGNOSIS — I42.2 PRIMARY HYPERTROPHIC CARDIOMYOPATHY (HCC): Primary | ICD-10-CM

## 2018-09-12 DIAGNOSIS — I47.2 PAROXYSMAL VENTRICULAR TACHYCARDIA (HCC): ICD-10-CM

## 2018-09-12 DIAGNOSIS — Z95.810 PRE-OPERATIVE CARDIOVASCULAR EXAMINATION, ICD IN PLACE: ICD-10-CM

## 2018-09-12 DIAGNOSIS — I83.813 VARICOSE VEINS OF BOTH LOWER EXTREMITIES WITH PAIN: ICD-10-CM

## 2018-09-12 DIAGNOSIS — I65.23 BILATERAL CAROTID ARTERY STENOSIS: ICD-10-CM

## 2018-09-12 DIAGNOSIS — R06.00 DYSPNEA, PAROXYSMAL NOCTURNAL: ICD-10-CM

## 2018-09-12 DIAGNOSIS — R60.9 EDEMA: ICD-10-CM

## 2018-09-12 DIAGNOSIS — R00.2 PALPITATIONS: ICD-10-CM

## 2018-09-12 DIAGNOSIS — R07.89 OTHER CHEST PAIN: ICD-10-CM

## 2018-09-12 DIAGNOSIS — Z45.02 ENCOUNTER DUE TO AICD AT END OF BATTERY LIFE: ICD-10-CM

## 2018-09-12 DIAGNOSIS — Z01.810 PRE-OPERATIVE CARDIOVASCULAR EXAMINATION, ICD IN PLACE: ICD-10-CM

## 2018-09-12 DIAGNOSIS — I10 ESSENTIAL HYPERTENSION, MALIGNANT: ICD-10-CM

## 2018-09-12 DIAGNOSIS — R55 SYNCOPE AND COLLAPSE: ICD-10-CM

## 2018-09-12 DIAGNOSIS — R94.30 NONSPECIFIC ABNORMAL FUNCTION STUDY, CARDIOVASCULAR: ICD-10-CM

## 2018-09-12 LAB
ALBUMIN SERPL-MCNC: 3.5 G/DL (ref 3.5–4.8)
ALBUMIN/GLOB SERPL: 1 {RATIO} (ref 1–2)
ALP LIVER SERPL-CCNC: 70 U/L (ref 55–142)
ALT SERPL-CCNC: 20 U/L (ref 14–54)
ANION GAP SERPL CALC-SCNC: 7 MMOL/L (ref 0–18)
AST SERPL-CCNC: 24 U/L (ref 15–41)
BASOPHILS # BLD AUTO: 0.05 X10(3) UL (ref 0–0.1)
BASOPHILS NFR BLD AUTO: 0.8 %
BILIRUB SERPL-MCNC: 0.6 MG/DL (ref 0.1–2)
BUN BLD-MCNC: 12 MG/DL (ref 8–20)
BUN/CREAT SERPL: 15 (ref 10–20)
CALCIUM BLD-MCNC: 9.1 MG/DL (ref 8.3–10.3)
CHLORIDE SERPL-SCNC: 110 MMOL/L (ref 101–111)
CHOLEST SMN-MCNC: 120 MG/DL (ref ?–200)
CO2 SERPL-SCNC: 24 MMOL/L (ref 22–32)
CREAT BLD-MCNC: 0.8 MG/DL (ref 0.55–1.02)
EOSINOPHIL # BLD AUTO: 0.43 X10(3) UL (ref 0–0.3)
EOSINOPHIL NFR BLD AUTO: 6.9 %
ERYTHROCYTE [DISTWIDTH] IN BLOOD BY AUTOMATED COUNT: 12.8 % (ref 11.5–16)
GLOBULIN PLAS-MCNC: 3.4 G/DL (ref 2.5–4)
GLUCOSE BLD-MCNC: 99 MG/DL (ref 70–99)
HCT VFR BLD AUTO: 39.1 % (ref 34–50)
HDLC SERPL-MCNC: 58 MG/DL (ref 40–59)
HGB BLD-MCNC: 13.4 G/DL (ref 12–16)
IMMATURE GRANULOCYTE COUNT: 0.02 X10(3) UL (ref 0–1)
IMMATURE GRANULOCYTE RATIO %: 0.3 %
LDLC SERPL CALC-MCNC: 50 MG/DL (ref ?–100)
LYMPHOCYTES # BLD AUTO: 2.1 X10(3) UL (ref 0.9–4)
LYMPHOCYTES NFR BLD AUTO: 33.6 %
M PROTEIN MFR SERPL ELPH: 6.9 G/DL (ref 6.1–8.3)
MCH RBC QN AUTO: 31.8 PG (ref 27–33.2)
MCHC RBC AUTO-ENTMCNC: 34.3 G/DL (ref 31–37)
MCV RBC AUTO: 92.9 FL (ref 81–100)
MONOCYTES # BLD AUTO: 0.38 X10(3) UL (ref 0.1–1)
MONOCYTES NFR BLD AUTO: 6.1 %
NEUTROPHIL ABS PRELIM: 3.27 X10 (3) UL (ref 1.3–6.7)
NEUTROPHILS # BLD AUTO: 3.27 X10(3) UL (ref 1.3–6.7)
NEUTROPHILS NFR BLD AUTO: 52.3 %
NONHDLC SERPL-MCNC: 62 MG/DL (ref ?–130)
OSMOLALITY SERPL CALC.SUM OF ELEC: 292 MOSM/KG (ref 275–295)
PLATELET # BLD AUTO: 215 10(3)UL (ref 150–450)
POTASSIUM SERPL-SCNC: 4.1 MMOL/L (ref 3.6–5.1)
PRO-BETA NATRIURETIC PEPTIDE: 609 PG/ML (ref ?–450)
RBC # BLD AUTO: 4.21 X10(6)UL (ref 3.8–5.1)
RED CELL DISTRIBUTION WIDTH-SD: 43.1 FL (ref 35.1–46.3)
SODIUM SERPL-SCNC: 141 MMOL/L (ref 136–144)
TRIGL SERPL-MCNC: 59 MG/DL (ref 30–149)
TSI SER-ACNC: 0.64 MIU/ML (ref 0.35–5.5)
VLDLC SERPL CALC-MCNC: 12 MG/DL (ref 0–30)
WBC # BLD AUTO: 6.3 X10(3) UL (ref 4–13)

## 2018-09-12 PROCEDURE — 84443 ASSAY THYROID STIM HORMONE: CPT

## 2018-09-12 PROCEDURE — 80061 LIPID PANEL: CPT

## 2018-09-12 PROCEDURE — 80053 COMPREHEN METABOLIC PANEL: CPT

## 2018-09-12 PROCEDURE — 83880 ASSAY OF NATRIURETIC PEPTIDE: CPT

## 2018-09-12 PROCEDURE — 85025 COMPLETE CBC W/AUTO DIFF WBC: CPT

## 2018-09-12 PROCEDURE — 36415 COLL VENOUS BLD VENIPUNCTURE: CPT

## 2018-09-13 ENCOUNTER — PRIOR ORIGINAL RECORDS (OUTPATIENT)
Dept: OTHER | Age: 78
End: 2018-09-13

## 2018-09-14 LAB
ALBUMIN: 3.5 G/DL
ALKALINE PHOSPHATATE(ALK PHOS): 70 IU/L
BILIRUBIN TOTAL: 0.6 MG/DL
BUN: 12 MG/DL
CALCIUM: 9.1 MG/DL
CHLORIDE: 110 MEQ/L
CHOLESTEROL, TOTAL: 120 MG/DL
CREATININE, SERUM: 0.8 MG/DL
GLOBULIN: 3.4 G/DL
GLUCOSE: 99 MG/DL
HDL CHOLESTEROL: 58 MG/DL
HEMATOCRIT: 39.1 %
HEMOGLOBIN: 13.4 G/DL
LDL CHOLESTEROL: 50 MG/DL
PLATELETS: 215 K/UL
POTASSIUM, SERUM: 4.1 MEQ/L
PROBNP: 609 PG/ML
PROTEIN, TOTAL: 6.9 G/DL
RED BLOOD COUNT: 4.21 X 10-6/U
SGOT (AST): 24 IU/L
SGPT (ALT): 20 IU/L
SODIUM: 141 MEQ/L
THYROID STIMULATING HORMONE: 0.64 MLU/L
TRIGLYCERIDES: 59 MG/DL
WHITE BLOOD COUNT: 6.3 X 10-3/U

## 2018-09-18 ENCOUNTER — MYAURORA ACCOUNT LINK (OUTPATIENT)
Dept: OTHER | Age: 78
End: 2018-09-18

## 2018-09-18 ENCOUNTER — HOSPITAL ENCOUNTER (OUTPATIENT)
Dept: CARDIOLOGY CLINIC | Facility: HOSPITAL | Age: 78
Discharge: HOME | End: 2018-09-18
Attending: INTERNAL MEDICINE

## 2018-09-18 ENCOUNTER — PRIOR ORIGINAL RECORDS (OUTPATIENT)
Dept: OTHER | Age: 78
End: 2018-09-18

## 2018-09-18 DIAGNOSIS — I65.23 BILATERAL CAROTID ARTERY STENOSIS: ICD-10-CM

## 2018-09-27 ENCOUNTER — PRIOR ORIGINAL RECORDS (OUTPATIENT)
Dept: OTHER | Age: 78
End: 2018-09-27

## 2018-09-27 ENCOUNTER — MYAURORA ACCOUNT LINK (OUTPATIENT)
Dept: OTHER | Age: 78
End: 2018-09-27

## 2018-10-25 ENCOUNTER — PRIOR ORIGINAL RECORDS (OUTPATIENT)
Dept: OTHER | Age: 78
End: 2018-10-25

## 2018-11-27 ENCOUNTER — HOSPITAL ENCOUNTER (OUTPATIENT)
Dept: ULTRASOUND IMAGING | Age: 78
Discharge: HOME | End: 2018-11-27
Attending: OTOLARYNGOLOGY
Payer: MEDICARE

## 2018-11-27 DIAGNOSIS — E04.1 THYROID NODULE: ICD-10-CM

## 2018-11-27 PROCEDURE — 76536 US EXAM OF HEAD AND NECK: CPT | Performed by: OTOLARYNGOLOGY

## 2018-12-06 ENCOUNTER — PRIOR ORIGINAL RECORDS (OUTPATIENT)
Dept: OTHER | Age: 78
End: 2018-12-06

## 2018-12-06 ENCOUNTER — MYAURORA ACCOUNT LINK (OUTPATIENT)
Dept: OTHER | Age: 78
End: 2018-12-06

## 2018-12-10 ENCOUNTER — MYAURORA ACCOUNT LINK (OUTPATIENT)
Dept: OTHER | Age: 78
End: 2018-12-10

## 2018-12-10 ENCOUNTER — PRIOR ORIGINAL RECORDS (OUTPATIENT)
Dept: OTHER | Age: 78
End: 2018-12-10

## 2019-01-04 ENCOUNTER — PRIOR ORIGINAL RECORDS (OUTPATIENT)
Dept: OTHER | Age: 79
End: 2019-01-04

## 2019-01-04 ENCOUNTER — MYAURORA ACCOUNT LINK (OUTPATIENT)
Dept: OTHER | Age: 79
End: 2019-01-04

## 2019-02-22 ENCOUNTER — PRIOR ORIGINAL RECORDS (OUTPATIENT)
Dept: OTHER | Age: 79
End: 2019-02-22

## 2019-02-28 VITALS — HEART RATE: 60 BPM | DIASTOLIC BLOOD PRESSURE: 72 MMHG | WEIGHT: 171 LBS | SYSTOLIC BLOOD PRESSURE: 122 MMHG

## 2019-02-28 VITALS
WEIGHT: 175 LBS | DIASTOLIC BLOOD PRESSURE: 74 MMHG | SYSTOLIC BLOOD PRESSURE: 136 MMHG | HEART RATE: 60 BPM | HEIGHT: 63 IN | BODY MASS INDEX: 31.01 KG/M2

## 2019-02-28 VITALS
HEART RATE: 60 BPM | WEIGHT: 170 LBS | DIASTOLIC BLOOD PRESSURE: 66 MMHG | SYSTOLIC BLOOD PRESSURE: 122 MMHG | BODY MASS INDEX: 31.28 KG/M2 | HEIGHT: 62 IN

## 2019-02-28 VITALS
SYSTOLIC BLOOD PRESSURE: 120 MMHG | HEIGHT: 63 IN | HEART RATE: 66 BPM | WEIGHT: 176 LBS | BODY MASS INDEX: 31.18 KG/M2 | DIASTOLIC BLOOD PRESSURE: 68 MMHG

## 2019-02-28 VITALS — HEART RATE: 68 BPM | SYSTOLIC BLOOD PRESSURE: 138 MMHG | DIASTOLIC BLOOD PRESSURE: 76 MMHG | WEIGHT: 173 LBS

## 2019-02-28 VITALS
HEIGHT: 63 IN | WEIGHT: 172 LBS | HEART RATE: 62 BPM | SYSTOLIC BLOOD PRESSURE: 160 MMHG | BODY MASS INDEX: 30.48 KG/M2 | DIASTOLIC BLOOD PRESSURE: 86 MMHG

## 2019-02-28 VITALS
SYSTOLIC BLOOD PRESSURE: 140 MMHG | HEIGHT: 63 IN | WEIGHT: 170 LBS | DIASTOLIC BLOOD PRESSURE: 70 MMHG | BODY MASS INDEX: 30.12 KG/M2 | HEART RATE: 62 BPM

## 2019-02-28 VITALS — SYSTOLIC BLOOD PRESSURE: 128 MMHG | HEART RATE: 60 BPM | DIASTOLIC BLOOD PRESSURE: 66 MMHG

## 2019-02-28 VITALS
SYSTOLIC BLOOD PRESSURE: 124 MMHG | BODY MASS INDEX: 31.36 KG/M2 | DIASTOLIC BLOOD PRESSURE: 70 MMHG | WEIGHT: 177 LBS | HEIGHT: 63 IN | HEART RATE: 60 BPM

## 2019-02-28 VITALS
HEART RATE: 66 BPM | SYSTOLIC BLOOD PRESSURE: 100 MMHG | DIASTOLIC BLOOD PRESSURE: 54 MMHG | WEIGHT: 174 LBS | BODY MASS INDEX: 30.83 KG/M2 | HEIGHT: 63 IN

## 2019-02-28 VITALS
DIASTOLIC BLOOD PRESSURE: 62 MMHG | HEART RATE: 60 BPM | WEIGHT: 168 LBS | HEIGHT: 62 IN | RESPIRATION RATE: 16 BRPM | BODY MASS INDEX: 30.91 KG/M2 | SYSTOLIC BLOOD PRESSURE: 118 MMHG

## 2019-02-28 VITALS
HEART RATE: 59 BPM | SYSTOLIC BLOOD PRESSURE: 152 MMHG | WEIGHT: 173 LBS | BODY MASS INDEX: 30.65 KG/M2 | DIASTOLIC BLOOD PRESSURE: 78 MMHG | HEIGHT: 63 IN

## 2019-02-28 VITALS
HEART RATE: 74 BPM | WEIGHT: 169 LBS | SYSTOLIC BLOOD PRESSURE: 118 MMHG | DIASTOLIC BLOOD PRESSURE: 74 MMHG | BODY MASS INDEX: 31.1 KG/M2 | HEIGHT: 62 IN

## 2019-02-28 VITALS
HEIGHT: 63 IN | BODY MASS INDEX: 30.3 KG/M2 | HEART RATE: 70 BPM | DIASTOLIC BLOOD PRESSURE: 66 MMHG | SYSTOLIC BLOOD PRESSURE: 110 MMHG | WEIGHT: 171 LBS

## 2019-02-28 VITALS
SYSTOLIC BLOOD PRESSURE: 126 MMHG | HEART RATE: 72 BPM | WEIGHT: 171 LBS | HEIGHT: 63 IN | BODY MASS INDEX: 30.3 KG/M2 | DIASTOLIC BLOOD PRESSURE: 74 MMHG

## 2019-03-05 RX ORDER — CARVEDILOL 25 MG/1
TABLET ORAL
COMMUNITY
Start: 2018-06-28 | End: 2019-08-05 | Stop reason: SDUPTHER

## 2019-03-05 RX ORDER — LEVOTHYROXINE SODIUM 0.1 MG/1
100 TABLET ORAL DAILY
COMMUNITY
Start: 2018-05-29

## 2019-03-05 RX ORDER — DULOXETIN HYDROCHLORIDE 60 MG/1
60 CAPSULE, DELAYED RELEASE ORAL DAILY
COMMUNITY
Start: 2012-05-18

## 2019-03-05 RX ORDER — LATANOPROST 50 UG/ML
1 SOLUTION/ DROPS OPHTHALMIC NIGHTLY
COMMUNITY
Start: 2014-07-03

## 2019-03-05 RX ORDER — EPLERENONE 25 MG/1
0.5 TABLET, FILM COATED ORAL DAILY
COMMUNITY
Start: 2019-02-20 | End: 2019-11-19 | Stop reason: SDUPTHER

## 2019-03-05 RX ORDER — ACETAMINOPHEN/DIPHENHYDRAMINE 500MG-25MG
1 TABLET ORAL NIGHTLY PRN
COMMUNITY

## 2019-03-07 VITALS
HEART RATE: 66 BPM | WEIGHT: 177 LBS | DIASTOLIC BLOOD PRESSURE: 76 MMHG | BODY MASS INDEX: 31.35 KG/M2 | SYSTOLIC BLOOD PRESSURE: 114 MMHG

## 2019-03-11 PROBLEM — Z95.810 ICD (IMPLANTABLE CARDIOVERTER-DEFIBRILLATOR) IN PLACE: Status: ACTIVE | Noted: 2018-05-24

## 2019-03-11 PROBLEM — I47.20 VENTRICULAR TACHYCARDIA (CMD): Status: ACTIVE | Noted: 2018-03-21

## 2019-03-11 PROBLEM — I42.2 OTHER HYPERTROPHIC CARDIOMYOPATHY (CMD): Status: ACTIVE | Noted: 2018-04-06

## 2019-03-11 PROBLEM — R55 SYNCOPE: Status: ACTIVE | Noted: 2018-03-21

## 2019-03-12 ENCOUNTER — OFFICE VISIT (OUTPATIENT)
Dept: CARDIOLOGY | Age: 79
End: 2019-03-12

## 2019-03-12 ENCOUNTER — E-ADVICE (OUTPATIENT)
Dept: CARDIOLOGY | Age: 79
End: 2019-03-12

## 2019-03-12 VITALS
HEIGHT: 63 IN | SYSTOLIC BLOOD PRESSURE: 118 MMHG | BODY MASS INDEX: 31.54 KG/M2 | HEART RATE: 60 BPM | DIASTOLIC BLOOD PRESSURE: 70 MMHG | WEIGHT: 178 LBS

## 2019-03-12 DIAGNOSIS — I65.23 ASYMPTOMATIC CAROTID ARTERY STENOSIS, BILATERAL: ICD-10-CM

## 2019-03-12 DIAGNOSIS — I10 HYPERTENSION, BENIGN: Primary | ICD-10-CM

## 2019-03-12 DIAGNOSIS — I42.2 OTHER HYPERTROPHIC CARDIOMYOPATHY (CMD): ICD-10-CM

## 2019-03-12 DIAGNOSIS — E78.00 PURE HYPERCHOLESTEROLEMIA: ICD-10-CM

## 2019-03-12 DIAGNOSIS — I47.20 VENTRICULAR TACHYCARDIA (CMD): ICD-10-CM

## 2019-03-12 DIAGNOSIS — Z95.810 ICD (IMPLANTABLE CARDIOVERTER-DEFIBRILLATOR) IN PLACE: ICD-10-CM

## 2019-03-12 PROCEDURE — 99214 OFFICE O/P EST MOD 30 MIN: CPT | Performed by: CLINICAL NURSE SPECIALIST

## 2019-03-12 ASSESSMENT — ENCOUNTER SYMPTOMS
HEMOPTYSIS: 0
FEVER: 0
WEIGHT GAIN: 0
NUMBNESS: 1
WEIGHT LOSS: 0
CHILLS: 0
BRUISES/BLEEDS EASILY: 0
HEMATOCHEZIA: 0
COUGH: 0
SUSPICIOUS LESIONS: 0
ALLERGIC/IMMUNOLOGIC COMMENTS: NO NEW FOOD ALLERGIES

## 2019-04-13 ENCOUNTER — LAB ENCOUNTER (OUTPATIENT)
Dept: LAB | Age: 79
End: 2019-04-13
Attending: INTERNAL MEDICINE
Payer: MEDICARE

## 2019-04-13 DIAGNOSIS — F32.A DEPRESSION, UNSPECIFIED DEPRESSION TYPE: ICD-10-CM

## 2019-04-13 DIAGNOSIS — E55.9 VITAMIN D DEFICIENCY: ICD-10-CM

## 2019-04-13 DIAGNOSIS — E78.00 HYPERCHOLESTEREMIA: ICD-10-CM

## 2019-04-13 DIAGNOSIS — L30.9 DERMATITIS: ICD-10-CM

## 2019-04-13 DIAGNOSIS — R20.0 NUMBNESS: ICD-10-CM

## 2019-04-13 PROCEDURE — 80053 COMPREHEN METABOLIC PANEL: CPT

## 2019-04-13 PROCEDURE — 80061 LIPID PANEL: CPT

## 2019-04-13 PROCEDURE — 82306 VITAMIN D 25 HYDROXY: CPT

## 2019-04-13 PROCEDURE — 36415 COLL VENOUS BLD VENIPUNCTURE: CPT

## 2019-05-01 ENCOUNTER — TELEPHONE (OUTPATIENT)
Dept: CARDIOLOGY | Age: 79
End: 2019-05-01

## 2019-05-02 DIAGNOSIS — I65.23 BILATERAL CAROTID ARTERY STENOSIS: Primary | ICD-10-CM

## 2019-05-02 DIAGNOSIS — I42.9 PRIMARY CARDIOMYOPATHY (CMD): Primary | ICD-10-CM

## 2019-06-11 ENCOUNTER — HOSPITAL ENCOUNTER (OUTPATIENT)
Dept: CV DIAGNOSTICS | Age: 79
Discharge: HOME | End: 2019-06-11
Attending: INTERNAL MEDICINE
Payer: MEDICARE

## 2019-06-11 DIAGNOSIS — I65.23 BILATERAL CAROTID ARTERY STENOSIS: ICD-10-CM

## 2019-06-11 PROCEDURE — 93880 EXTRACRANIAL BILAT STUDY: CPT | Performed by: INTERNAL MEDICINE

## 2019-06-13 DIAGNOSIS — I65.23 BILATERAL CAROTID ARTERY STENOSIS: ICD-10-CM

## 2019-06-20 ENCOUNTER — OFFICE VISIT (OUTPATIENT)
Dept: CARDIOLOGY | Age: 79
End: 2019-06-20

## 2019-06-20 ENCOUNTER — APPOINTMENT (OUTPATIENT)
Dept: CARDIOLOGY | Age: 79
End: 2019-06-20
Attending: INTERNAL MEDICINE

## 2019-06-20 VITALS
DIASTOLIC BLOOD PRESSURE: 68 MMHG | HEART RATE: 70 BPM | SYSTOLIC BLOOD PRESSURE: 118 MMHG | BODY MASS INDEX: 32.25 KG/M2 | HEIGHT: 63 IN | WEIGHT: 182 LBS

## 2019-06-20 DIAGNOSIS — I65.23 ASYMPTOMATIC CAROTID ARTERY STENOSIS, BILATERAL: Primary | ICD-10-CM

## 2019-06-20 DIAGNOSIS — Z95.810 ICD (IMPLANTABLE CARDIOVERTER-DEFIBRILLATOR) IN PLACE: ICD-10-CM

## 2019-06-20 DIAGNOSIS — I42.2 OTHER HYPERTROPHIC CARDIOMYOPATHY (CMD): ICD-10-CM

## 2019-06-20 DIAGNOSIS — I47.20 VENTRICULAR TACHYCARDIA (CMD): ICD-10-CM

## 2019-06-20 DIAGNOSIS — R55 SYNCOPE, UNSPECIFIED SYNCOPE TYPE: ICD-10-CM

## 2019-06-20 DIAGNOSIS — I10 HYPERTENSION, BENIGN: ICD-10-CM

## 2019-06-20 PROCEDURE — 99214 OFFICE O/P EST MOD 30 MIN: CPT | Performed by: INTERNAL MEDICINE

## 2019-06-20 SDOH — HEALTH STABILITY: PHYSICAL HEALTH: ON AVERAGE, HOW MANY DAYS PER WEEK DO YOU ENGAGE IN MODERATE TO STRENUOUS EXERCISE (LIKE A BRISK WALK)?: 0 DAYS

## 2019-06-20 SDOH — HEALTH STABILITY: PHYSICAL HEALTH: ON AVERAGE, HOW MANY MINUTES DO YOU ENGAGE IN EXERCISE AT THIS LEVEL?: 0 MIN

## 2019-06-20 ASSESSMENT — ENCOUNTER SYMPTOMS
HEMOPTYSIS: 0
BRUISES/BLEEDS EASILY: 0
WEIGHT GAIN: 0
COUGH: 0
HEMATOCHEZIA: 0
WEIGHT LOSS: 0
FEVER: 0
CHILLS: 0
ALLERGIC/IMMUNOLOGIC COMMENTS: NO NEW FOOD ALLERGIES
SUSPICIOUS LESIONS: 0

## 2019-06-24 ENCOUNTER — TELEPHONE (OUTPATIENT)
Dept: CARDIOLOGY | Age: 79
End: 2019-06-24

## 2019-06-24 DIAGNOSIS — I65.23 BILATERAL CAROTID ARTERY STENOSIS: Primary | ICD-10-CM

## 2019-06-28 ENCOUNTER — OFFICE VISIT (OUTPATIENT)
Dept: SURGERY | Facility: CLINIC | Age: 79
End: 2019-06-28
Payer: MEDICARE

## 2019-06-28 VITALS
SYSTOLIC BLOOD PRESSURE: 124 MMHG | HEART RATE: 74 BPM | WEIGHT: 171 LBS | HEIGHT: 62 IN | DIASTOLIC BLOOD PRESSURE: 72 MMHG | BODY MASS INDEX: 31.47 KG/M2

## 2019-06-28 DIAGNOSIS — R20.0 ARM NUMBNESS: Primary | ICD-10-CM

## 2019-06-28 DIAGNOSIS — R27.8 CLUMSINESS: ICD-10-CM

## 2019-06-28 PROCEDURE — 99203 OFFICE O/P NEW LOW 30 MIN: CPT | Performed by: PHYSICIAN ASSISTANT

## 2019-06-28 NOTE — H&P
Neurosurgery Clinic Visit  2019    Sofiya Camacho 37 PCP:  Kamar Liz MD    1940 MRN [id number]       CC:  L > R CTS    HPI:    Sofiya is a very pleasant 70year old female who presents with progressive b/l hand tingling/numbness.   The le Concern: Yes          5-6 cups a day        Exercise: No    Family History   Adopted: Yes   Problem Relation Age of Onset   • Heart Disorder Father    • Heart Disorder Son    • Other (subclavian stenosis) Son      ROS:  A 10-point system was reviewed.   Per [facility]  6/28/2019  12:22 PM

## 2019-06-28 NOTE — PROGRESS NOTES
New Pt is here for carpel tunnel. EMG obtained. Pt states that she has been having numbness and tingling in the bilateral hands. Pt states more pain is in the left hand. Pt states that she has been having these symptomes for a while now.  Pt denied weak

## 2019-07-12 ENCOUNTER — TELEPHONE (OUTPATIENT)
Dept: CARDIOLOGY | Age: 79
End: 2019-07-12

## 2019-07-12 ENCOUNTER — ANCILLARY PROCEDURE (OUTPATIENT)
Dept: CARDIOLOGY | Age: 79
End: 2019-07-12
Attending: INTERNAL MEDICINE

## 2019-07-12 ENCOUNTER — ANCILLARY ORDERS (OUTPATIENT)
Dept: CARDIOLOGY | Age: 79
End: 2019-07-12

## 2019-07-12 DIAGNOSIS — Z95.810 ICD (IMPLANTABLE CARDIOVERTER-DEFIBRILLATOR) IN PLACE: ICD-10-CM

## 2019-07-13 PROCEDURE — 93295 DEV INTERROG REMOTE 1/2/MLT: CPT | Performed by: INTERNAL MEDICINE

## 2019-08-05 ENCOUNTER — TELEPHONE (OUTPATIENT)
Dept: CARDIOLOGY | Age: 79
End: 2019-08-05

## 2019-08-05 RX ORDER — CARVEDILOL 25 MG/1
25 TABLET ORAL 2 TIMES DAILY WITH MEALS
Qty: 180 TABLET | Refills: 1 | Status: SHIPPED | OUTPATIENT
Start: 2019-08-05 | End: 2019-11-11 | Stop reason: ALTCHOICE

## 2019-08-05 RX ORDER — CARVEDILOL 25 MG/1
25 TABLET ORAL 2 TIMES DAILY WITH MEALS
Qty: 90 TABLET | Refills: 1 | Status: SHIPPED | OUTPATIENT
Start: 2019-08-05 | End: 2019-11-01 | Stop reason: SDUPTHER

## 2019-08-08 ENCOUNTER — TELEPHONE (OUTPATIENT)
Dept: CARDIOLOGY | Age: 79
End: 2019-08-08

## 2019-08-13 ENCOUNTER — TELEPHONE (OUTPATIENT)
Dept: CARDIOLOGY | Age: 79
End: 2019-08-13

## 2019-08-15 ENCOUNTER — HOSPITAL ENCOUNTER (OUTPATIENT)
Dept: MRI IMAGING | Facility: HOSPITAL | Age: 79
Discharge: HOME | End: 2019-08-15
Attending: PHYSICIAN ASSISTANT
Payer: MEDICARE

## 2019-08-15 DIAGNOSIS — R20.0 ARM NUMBNESS: ICD-10-CM

## 2019-08-15 DIAGNOSIS — R27.8 CLUMSINESS: ICD-10-CM

## 2019-08-15 PROCEDURE — 72141 MRI NECK SPINE W/O DYE: CPT | Performed by: PHYSICIAN ASSISTANT

## 2019-08-19 ENCOUNTER — TELEPHONE (OUTPATIENT)
Dept: SURGERY | Facility: CLINIC | Age: 79
End: 2019-08-19

## 2019-08-22 NOTE — TELEPHONE ENCOUNTER
Discussed MRI results with pt. She will make an appointment to f/u with Dr. Denice Henderson for re-evaluation and to review treatment options.

## 2019-09-11 ENCOUNTER — OFFICE VISIT (OUTPATIENT)
Dept: SURGERY | Facility: CLINIC | Age: 79
End: 2019-09-11
Payer: MEDICARE

## 2019-09-11 ENCOUNTER — OFFICE VISIT (OUTPATIENT)
Dept: PAIN CLINIC | Facility: CLINIC | Age: 79
End: 2019-09-11
Payer: MEDICARE

## 2019-09-11 VITALS — WEIGHT: 170 LBS | BODY MASS INDEX: 31.28 KG/M2 | HEIGHT: 62 IN | HEART RATE: 63 BPM | OXYGEN SATURATION: 98 %

## 2019-09-11 VITALS — HEART RATE: 72 BPM | DIASTOLIC BLOOD PRESSURE: 70 MMHG | SYSTOLIC BLOOD PRESSURE: 120 MMHG

## 2019-09-11 DIAGNOSIS — M79.602 PAIN IN BOTH UPPER EXTREMITIES: Primary | ICD-10-CM

## 2019-09-11 DIAGNOSIS — M79.601 PAIN IN BOTH UPPER EXTREMITIES: Primary | ICD-10-CM

## 2019-09-11 DIAGNOSIS — M75.51 SUBDELTOID BURSITIS OF RIGHT SHOULDER JOINT: ICD-10-CM

## 2019-09-11 DIAGNOSIS — M19.012 OSTEOARTHRITIS OF BOTH SHOULDERS, UNSPECIFIED OSTEOARTHRITIS TYPE: ICD-10-CM

## 2019-09-11 DIAGNOSIS — M19.011 OSTEOARTHRITIS OF BOTH SHOULDERS, UNSPECIFIED OSTEOARTHRITIS TYPE: ICD-10-CM

## 2019-09-11 DIAGNOSIS — G56.03 BILATERAL CARPAL TUNNEL SYNDROME: Primary | ICD-10-CM

## 2019-09-11 DIAGNOSIS — M75.52 SUBDELTOID BURSITIS, LEFT: ICD-10-CM

## 2019-09-11 PROCEDURE — 99214 OFFICE O/P EST MOD 30 MIN: CPT | Performed by: NURSE PRACTITIONER

## 2019-09-11 PROCEDURE — 99213 OFFICE O/P EST LOW 20 MIN: CPT | Performed by: NEUROLOGICAL SURGERY

## 2019-09-11 NOTE — PROGRESS NOTES
Spoke with patient and scheduled injection(s). Reviewed pre-op instructions. Patient verbalized understanding, and agreeable to holding ASA 81 mg medications as indicated below.   Encouraged pt to contact office if changes in health status occur (including Advil, Vicoprofen), Naproxen (Naprosyn, Aleve), Piroxcam (Feldene), Meloxicam (Mobic)--(Cervical procedures will require 3 days), Oxaprozin (Daypro), Diclofenac (Voltaren),  Indomethacin (Indocin), Etodolac (Lodine), Nabumetone (Relafen), Celebrex (Celecox

## 2019-09-11 NOTE — H&P
Name: Jaylon Arriaza   : 1940   DOS: 2019     Chief complaint: Patient presents with:  Consult      History of present illness:  Sofiya OSWALD Keshawn Quintana is a 70year old female complaining of arm numbness.  Patient unsure of when her sympto BREAKFAST Disp: 90 tablet Rfl: 0   ATORVASTATIN 40 MG Oral Tab TAKE 1 TABLET BY MOUTH ONE TIME A DAY  Disp: 90 tablet Rfl: 1   Betamethasone Dipropionate Aug 0.05 % External Cream Apply bid for 10 days then prn.  Disp: 60 g Rfl: 5   carvedilol 25 MG Oral Ta or any  other neurologic problems. Genitourinary:  Denies dysuria, hematuria. Musculoskeletal:  SEE HPI  Vascular: Negative. Specifically denies phlebitis, DVT, PE, bleeding problems, hemophilia or any other vascular problems.   Dermatology: Negative for osteophyte complex is noted. No spinal canal or neural foraminal stenosis. CRANIOCERVICAL AREA:  Normal foramen magnum with no Chiari malformation. PARASPINAL AREA:  Normal with no visible mass.     BONY STRUCTURES:  No fracture, pars defect, or oss Neurosciences

## 2019-09-11 NOTE — PROGRESS NOTES
Patient presents in office today with reported pain in Bilateral bicep with numbness in bilateral arms. Patient reports that she has carpal tunnel and has fingers that \"lock together\", has trouble holding up books or newspapers.  Will intermittently have

## 2019-09-11 NOTE — PROGRESS NOTES
[facility]  Neurosurgery Clinic Visit      HISTORY OF PRESENT ILLNESS:  Sofiya Chan is a(n) 70year old female returns to discuss MRI results.   She has different pain, she experiences an aching pain that is mainly in her bilat was reviewed. Pertinent positives and negatives are noted in HPI. PHYSICAL EXAMINATION:  VITAL SIGNS: /70   Pulse 72   GENERAL:  Patient is a 70year old female in no acute distress.   NEUROLOGICAL:     Cognition: alert and oriented x 3    Crani

## 2019-09-11 NOTE — PROGRESS NOTES
Pt is here for follow up for CTS. MRI of the cervical: Obtained     Pt states that she is still the same since LOV. No new symptomes to be reported.

## 2019-09-11 NOTE — PATIENT INSTRUCTIONS
Refill policies:    • Allow 2-3 business days for refills; controlled substances may take longer.   • Contact your pharmacy at least 5 days prior to running out of medication and have them send an electronic request or submit request through the “request re Depending on your insurance carrier, approval may take 3-10 days. It is highly recommended patients contact their insurance carrier directly to determine coverage.   If test is done without insurance authorization, patient may be responsible for the entire 9/18/19, 9/25/19  Appointment Time: 1:00 PM, 1:00 PM  Physician: Felix Au    ** TO AVOID CANCELLATION AND/OR RESCHEDULING: PLEASE CALL [facility] PRE-PROCEDURE LINE -[phone] FOR DETAILED INSTRUCTIONS FIVE TO SEVEN DAYS PRIOR TO PROCEDURE**      Location: [facility] what your financial  responsibility will be for the procedure(s). Cancellation/Rescheduling Appointment:   In the event you need to cancel or reschedule your appointment, you must notify the office 24 hours prior.     Post-procedure instructions:

## 2019-09-18 ENCOUNTER — OFFICE VISIT (OUTPATIENT)
Dept: PAIN CLINIC | Facility: CLINIC | Age: 79
End: 2019-09-18
Payer: MEDICARE

## 2019-09-18 VITALS
SYSTOLIC BLOOD PRESSURE: 118 MMHG | BODY MASS INDEX: 31.28 KG/M2 | WEIGHT: 170 LBS | DIASTOLIC BLOOD PRESSURE: 60 MMHG | HEART RATE: 73 BPM | OXYGEN SATURATION: 94 % | HEIGHT: 62 IN

## 2019-09-18 DIAGNOSIS — G56.03 CARPAL TUNNEL SYNDROME, BILATERAL: Primary | ICD-10-CM

## 2019-09-18 PROCEDURE — 76942 ECHO GUIDE FOR BIOPSY: CPT | Performed by: ANESTHESIOLOGY

## 2019-09-18 PROCEDURE — 64450 NJX AA&/STRD OTHER PN/BRANCH: CPT | Performed by: ANESTHESIOLOGY

## 2019-09-18 RX ORDER — METHYLPREDNISOLONE ACETATE 40 MG/ML
40 INJECTION, SUSPENSION INTRA-ARTICULAR; INTRALESIONAL; INTRAMUSCULAR; SOFT TISSUE ONCE
Status: COMPLETED | OUTPATIENT
Start: 2019-09-18 | End: 2019-09-18

## 2019-09-18 RX ORDER — LIDOCAINE HYDROCHLORIDE 10 MG/ML
5 INJECTION, SOLUTION INFILTRATION; PERINEURAL ONCE
Status: COMPLETED | OUTPATIENT
Start: 2019-09-18 | End: 2019-09-18

## 2019-09-18 NOTE — PROGRESS NOTES
Pt denies fever or flu-like sx w/in past week. Pt denies any rash or open wounds on any part of body today. Timeout completed prior to procedure @ 6847. Participants present for timeout:  Dr. Rossy Gill, and patient.

## 2019-09-20 ENCOUNTER — HOSPITAL ENCOUNTER (OUTPATIENT)
Dept: CV DIAGNOSTICS | Age: 79
Discharge: HOME | End: 2019-09-20
Attending: INTERNAL MEDICINE
Payer: MEDICARE

## 2019-09-20 DIAGNOSIS — I42.8 OTHER CARDIOMYOPATHY (HCC): ICD-10-CM

## 2019-09-20 PROCEDURE — 93306 TTE W/DOPPLER COMPLETE: CPT | Performed by: INTERNAL MEDICINE

## 2019-09-22 NOTE — PROCEDURES
[facility]  Operative Report  2019     Sofiya Steele Patient Status:  No patient class for patient encounter    1940 MRN [id number]   Location ED [facility], [address], [address] Attending No att. providers found   H benefits of the procedure.       Complications: None. Follow up: The patient will be followed in the pain clinic as needed basis.     MD Nelsy Hernandez

## 2019-09-23 DIAGNOSIS — I42.9 PRIMARY CARDIOMYOPATHY (CMD): ICD-10-CM

## 2019-09-25 ENCOUNTER — OFFICE VISIT (OUTPATIENT)
Dept: PAIN CLINIC | Facility: CLINIC | Age: 79
End: 2019-09-25
Payer: MEDICARE

## 2019-09-25 ENCOUNTER — E-ADVICE (OUTPATIENT)
Dept: CARDIOLOGY | Age: 79
End: 2019-09-25

## 2019-09-25 VITALS
HEART RATE: 64 BPM | DIASTOLIC BLOOD PRESSURE: 70 MMHG | WEIGHT: 170 LBS | OXYGEN SATURATION: 87 % | BODY MASS INDEX: 31.28 KG/M2 | HEIGHT: 62 IN | SYSTOLIC BLOOD PRESSURE: 116 MMHG

## 2019-09-25 DIAGNOSIS — G56.03 BILATERAL CARPAL TUNNEL SYNDROME: Primary | ICD-10-CM

## 2019-09-25 PROCEDURE — 64450 NJX AA&/STRD OTHER PN/BRANCH: CPT | Performed by: ANESTHESIOLOGY

## 2019-09-25 PROCEDURE — 76942 ECHO GUIDE FOR BIOPSY: CPT | Performed by: ANESTHESIOLOGY

## 2019-09-25 RX ORDER — METHYLPREDNISOLONE ACETATE 40 MG/ML
40 INJECTION, SUSPENSION INTRA-ARTICULAR; INTRALESIONAL; INTRAMUSCULAR; SOFT TISSUE ONCE
Status: COMPLETED | OUTPATIENT
Start: 2019-09-25 | End: 2019-09-25

## 2019-09-25 RX ORDER — LIDOCAINE HYDROCHLORIDE 10 MG/ML
5 INJECTION, SOLUTION INFILTRATION; PERINEURAL ONCE
Status: COMPLETED | OUTPATIENT
Start: 2019-09-25 | End: 2019-09-25

## 2019-09-25 NOTE — PROGRESS NOTES
Pt denies fever, illness, open wounds, or rash on any part of body today. Timeout completed prior to procedure @ 5426. Participants present for timeout:  Dr. Rossy Gill, and patient.

## 2019-09-25 NOTE — PROGRESS NOTES
Patient presents in office today with reported numbness in both arms    Current pain level reported = 0/10

## 2019-09-28 NOTE — PROCEDURES
[facility]  Operative Report  2019     Melody L Saint Earnest Patient Status:  No patient class for patient encounter    1940 MRN [id number]   Location ED [facility], [address], [address] Attending No att. providers found   Hos benefits of the procedure.        Complications: None.     Follow up:  The patient will be followed in the pain clinic as needed basis.  Chava Robles MD

## 2019-10-04 ENCOUNTER — TELEPHONE (OUTPATIENT)
Dept: CARDIOLOGY | Age: 79
End: 2019-10-04

## 2019-10-04 DIAGNOSIS — Z95.810 ICD (IMPLANTABLE CARDIOVERTER-DEFIBRILLATOR) IN PLACE: ICD-10-CM

## 2019-10-04 DIAGNOSIS — I65.23 ASYMPTOMATIC CAROTID ARTERY STENOSIS, BILATERAL: Primary | ICD-10-CM

## 2019-10-04 DIAGNOSIS — R06.00 DYSPNEA, UNSPECIFIED TYPE: ICD-10-CM

## 2019-10-04 DIAGNOSIS — I10 HYPERTENSION, BENIGN: ICD-10-CM

## 2019-10-04 DIAGNOSIS — I47.20 VENTRICULAR TACHYCARDIA (CMD): ICD-10-CM

## 2019-10-04 DIAGNOSIS — I42.2 OTHER HYPERTROPHIC CARDIOMYOPATHY (CMD): ICD-10-CM

## 2019-10-04 DIAGNOSIS — R55 SYNCOPE, UNSPECIFIED SYNCOPE TYPE: ICD-10-CM

## 2019-10-04 DIAGNOSIS — E78.00 PURE HYPERCHOLESTEROLEMIA: ICD-10-CM

## 2019-10-09 ENCOUNTER — OFFICE VISIT (OUTPATIENT)
Dept: SURGERY | Facility: CLINIC | Age: 79
End: 2019-10-09
Payer: MEDICARE

## 2019-10-09 VITALS — SYSTOLIC BLOOD PRESSURE: 126 MMHG | HEART RATE: 72 BPM | DIASTOLIC BLOOD PRESSURE: 66 MMHG

## 2019-10-09 DIAGNOSIS — M79.601 PAIN IN BOTH UPPER EXTREMITIES: Primary | ICD-10-CM

## 2019-10-09 DIAGNOSIS — M79.602 PAIN IN BOTH UPPER EXTREMITIES: Primary | ICD-10-CM

## 2019-10-09 PROCEDURE — 99212 OFFICE O/P EST SF 10 MIN: CPT | Performed by: NEUROLOGICAL SURGERY

## 2019-10-09 NOTE — PROGRESS NOTES
Pt is here for follow up post injections:     Right median nerve block 9/18/19 90% of relief      Left median nerve block 9/25/19 Numbness is better since LOV. 75% OF RELIEF. Pt states that pain has improved since LOV. No new symptome's to be reported.

## 2019-10-09 NOTE — PROGRESS NOTES
[facility]  Neurosurgery Clinic Visit      HISTORY OF PRESENT ILLNESS:  Sofiya Chan is a(n) 70year old female returns after bilateral carpal tunnel injections with Dr. Anaid Rios.  On the right, she got significant relief, does not Discussed definitive treatment of surgical decompression including procedure, risks/benefits/alternatives. She will consider this and has to arrange for help at home, her  has dementia.       PLAN:  -patient will call when ready to schedule    Thelma Navarro

## 2019-10-22 ENCOUNTER — ANCILLARY PROCEDURE (OUTPATIENT)
Dept: CARDIOLOGY | Age: 79
End: 2019-10-22
Attending: INTERNAL MEDICINE

## 2019-10-22 ENCOUNTER — ANCILLARY ORDERS (OUTPATIENT)
Dept: CARDIOLOGY | Age: 79
End: 2019-10-22

## 2019-10-22 DIAGNOSIS — Z95.810 ICD (IMPLANTABLE CARDIOVERTER-DEFIBRILLATOR) IN PLACE: ICD-10-CM

## 2019-10-22 PROCEDURE — X1114 CARDIAC DEVICE HOME CHECK - REMOTE UNSCHEDULED: HCPCS | Performed by: INTERNAL MEDICINE

## 2019-10-22 PROCEDURE — 93295 DEV INTERROG REMOTE 1/2/MLT: CPT | Performed by: INTERNAL MEDICINE

## 2019-10-31 ENCOUNTER — DOCUMENTATION (OUTPATIENT)
Dept: CARDIOLOGY | Age: 79
End: 2019-10-31

## 2019-11-01 RX ORDER — CARVEDILOL 25 MG/1
TABLET ORAL
Qty: 60 TABLET | Refills: 0 | Status: SHIPPED | OUTPATIENT
Start: 2019-11-01 | End: 2019-11-01 | Stop reason: SDUPTHER

## 2019-11-01 RX ORDER — CARVEDILOL 25 MG/1
TABLET ORAL
Qty: 180 TABLET | Refills: 0 | Status: SHIPPED | OUTPATIENT
Start: 2019-11-01 | End: 2019-11-11 | Stop reason: ALTCHOICE

## 2019-11-07 ENCOUNTER — LAB ENCOUNTER (OUTPATIENT)
Dept: LAB | Age: 79
End: 2019-11-07
Attending: INTERNAL MEDICINE
Payer: MEDICARE

## 2019-11-07 DIAGNOSIS — E07.9 THYROID DYSFUNCTION: ICD-10-CM

## 2019-11-07 DIAGNOSIS — E03.9 HYPOTHYROIDISM, UNSPECIFIED TYPE: ICD-10-CM

## 2019-11-07 DIAGNOSIS — F32.A DEPRESSION, UNSPECIFIED DEPRESSION TYPE: ICD-10-CM

## 2019-11-07 DIAGNOSIS — E78.00 HYPERCHOLESTEREMIA: ICD-10-CM

## 2019-11-07 LAB
25(OH)D3+25(OH)D2 SERPL-MCNC: 20 NG/ML
ABSOLUTE IMMATURE GRANULOCYTES (OFFPRE24): NORMAL
BASO+EOS+MONOS # BLD: NORMAL 10*3/UL
BASO+EOS+MONOS NFR BLD: NORMAL %
BASOPHILS # BLD: NORMAL 10*3/UL
BASOPHILS NFR BLD: NORMAL %
CHOLEST SERPL-MCNC: 144 MG/DL
CHOLEST/HDLC SERPL: NORMAL {RATIO}
DIFFERENTIAL METHOD BLD: NORMAL
EOSINOPHIL # BLD: NORMAL 10*3/UL
EOSINOPHIL NFR BLD: NORMAL %
ERYTHROCYTE [DISTWIDTH] IN BLOOD: NORMAL %
HCT VFR BLD CALC: 41.5 %
HDLC SERPL-MCNC: 60 MG/DL
HGB BLD-MCNC: 14.2 G/DL
IMMATURE GRANULOCYTES (OFFPRE25): NORMAL
LDLC SERPL CALC-MCNC: 65 MG/DL
LENGTH OF FAST TIME PATIENT: NORMAL H
LYMPHOCYTES # BLD: NORMAL 10*3/UL
LYMPHOCYTES NFR BLD: NORMAL %
MCH RBC QN AUTO: NORMAL PG
MCHC RBC AUTO-ENTMCNC: NORMAL G/DL
MCV RBC AUTO: NORMAL FL
MONOCYTES # BLD: NORMAL 10*3/UL
MONOCYTES NFR BLD: NORMAL %
MPV (OFFPRE2): NORMAL
NEUTROPHILS # BLD: NORMAL 10*3/UL
NEUTROPHILS NFR BLD: NORMAL %
NONHDLC SERPL-MCNC: NORMAL MG/DL
NRBC BLD MANUAL-RTO: NORMAL %
PLAT MORPH BLD: NORMAL
PLATELET # BLD: 259 10*3/UL
RBC # BLD: 4.36 10*6/UL
RBC MORPH BLD: NORMAL
T4 FREE SERPL-MCNC: 1.2 NG/DL
TRIGL SERPL-MCNC: 95 MG/DL
TSH SERPL-ACNC: 1.6 M[IU]/L
VLDLC SERPL CALC-MCNC: NORMAL MG/DL
WBC # BLD: 9.6 10*3/UL
WBC MORPH BLD: NORMAL

## 2019-11-07 PROCEDURE — 82607 VITAMIN B-12: CPT

## 2019-11-07 PROCEDURE — 84443 ASSAY THYROID STIM HORMONE: CPT

## 2019-11-07 PROCEDURE — 80053 COMPREHEN METABOLIC PANEL: CPT

## 2019-11-07 PROCEDURE — 84439 ASSAY OF FREE THYROXINE: CPT

## 2019-11-07 PROCEDURE — 85025 COMPLETE CBC W/AUTO DIFF WBC: CPT

## 2019-11-07 PROCEDURE — 82306 VITAMIN D 25 HYDROXY: CPT

## 2019-11-07 PROCEDURE — 80061 LIPID PANEL: CPT

## 2019-11-07 PROCEDURE — 36415 COLL VENOUS BLD VENIPUNCTURE: CPT

## 2019-11-08 ENCOUNTER — DOCUMENTATION (OUTPATIENT)
Dept: CARDIOLOGY | Age: 79
End: 2019-11-08

## 2019-11-11 ENCOUNTER — OFFICE VISIT (OUTPATIENT)
Dept: CARDIOLOGY | Age: 79
End: 2019-11-11

## 2019-11-11 VITALS
SYSTOLIC BLOOD PRESSURE: 138 MMHG | BODY MASS INDEX: 31.35 KG/M2 | DIASTOLIC BLOOD PRESSURE: 72 MMHG | HEART RATE: 76 BPM | WEIGHT: 177 LBS

## 2019-11-11 DIAGNOSIS — I47.20 VENTRICULAR TACHYCARDIA (CMD): ICD-10-CM

## 2019-11-11 DIAGNOSIS — I65.23 ASYMPTOMATIC CAROTID ARTERY STENOSIS, BILATERAL: ICD-10-CM

## 2019-11-11 DIAGNOSIS — R93.1 ELEVATED CORONARY ARTERY CALCIUM SCORE: ICD-10-CM

## 2019-11-11 DIAGNOSIS — R06.02 SHORTNESS OF BREATH: ICD-10-CM

## 2019-11-11 DIAGNOSIS — E55.9 VITAMIN D DEFICIENCY: ICD-10-CM

## 2019-11-11 DIAGNOSIS — E78.00 PURE HYPERCHOLESTEROLEMIA: ICD-10-CM

## 2019-11-11 DIAGNOSIS — Z95.810 ICD (IMPLANTABLE CARDIOVERTER-DEFIBRILLATOR) IN PLACE: ICD-10-CM

## 2019-11-11 DIAGNOSIS — I42.2 OTHER HYPERTROPHIC CARDIOMYOPATHY (CMD): Primary | ICD-10-CM

## 2019-11-11 DIAGNOSIS — I10 HYPERTENSION, BENIGN: ICD-10-CM

## 2019-11-11 DIAGNOSIS — E78.5 DYSLIPIDEMIA: ICD-10-CM

## 2019-11-11 DIAGNOSIS — I10 ESSENTIAL HYPERTENSION: ICD-10-CM

## 2019-11-11 DIAGNOSIS — R55 SYNCOPE, UNSPECIFIED SYNCOPE TYPE: ICD-10-CM

## 2019-11-11 PROCEDURE — 99214 OFFICE O/P EST MOD 30 MIN: CPT | Performed by: INTERNAL MEDICINE

## 2019-11-11 RX ORDER — ERGOCALCIFEROL 1.25 MG/1
CAPSULE ORAL
Qty: 4 CAPSULE | Refills: 12 | Status: CANCELLED | OUTPATIENT
Start: 2019-11-11

## 2019-11-11 RX ORDER — OLMESARTAN MEDOXOMIL 5 MG/1
5 TABLET ORAL DAILY
Qty: 90 TABLET | Refills: 3 | Status: SHIPPED | OUTPATIENT
Start: 2019-11-11 | End: 2019-12-13 | Stop reason: SDUPTHER

## 2019-11-11 RX ORDER — METOPROLOL SUCCINATE 200 MG/1
200 TABLET, EXTENDED RELEASE ORAL EVERY MORNING
Qty: 90 TABLET | Refills: 3 | Status: SHIPPED | OUTPATIENT
Start: 2019-11-11 | End: 2019-12-13

## 2019-11-11 RX ORDER — ERGOCALCIFEROL 1.25 MG/1
1.25 CAPSULE ORAL
Qty: 12 CAPSULE | Refills: 3 | Status: SHIPPED | OUTPATIENT
Start: 2019-11-11 | End: 2020-06-22

## 2019-11-12 ENCOUNTER — TELEPHONE (OUTPATIENT)
Dept: CARDIOLOGY | Age: 79
End: 2019-11-12

## 2019-11-13 ENCOUNTER — TELEPHONE (OUTPATIENT)
Dept: CARDIOLOGY | Age: 79
End: 2019-11-13

## 2019-11-13 ENCOUNTER — APPOINTMENT (OUTPATIENT)
Dept: CARDIOLOGY | Age: 79
End: 2019-11-13

## 2019-11-13 NOTE — PROGRESS NOTES
Low vit D, start vit D 50,000 U twice weekly for 6 months. Take with food. B12 is below 400, should start coming for monthly B12 shots for 6 months.

## 2019-11-13 NOTE — PROGRESS NOTES
[phone]    pt regarding doctors results and recommendations. Pt states she saw Dr Babatunde Cárdenas cardio and was given 27610 IU weekly. Asking if she should take once or twice a week.   Pt also asking if she can give her own B12 as she has many appts for he

## 2019-11-20 RX ORDER — EPLERENONE 25 MG/1
TABLET, FILM COATED ORAL
Qty: 45 TABLET | Refills: 3 | Status: SHIPPED | OUTPATIENT
Start: 2019-11-20 | End: 2020-11-13

## 2019-11-22 ENCOUNTER — CLINICAL ABSTRACT (OUTPATIENT)
Dept: CARDIOLOGY | Age: 79
End: 2019-11-22

## 2019-12-05 ENCOUNTER — HOSPITAL ENCOUNTER (OUTPATIENT)
Dept: ULTRASOUND IMAGING | Age: 79
Discharge: HOME | End: 2019-12-05
Attending: OTOLARYNGOLOGY
Payer: MEDICARE

## 2019-12-05 DIAGNOSIS — E04.1 NONTOXIC THYROID NODULE: ICD-10-CM

## 2019-12-05 PROCEDURE — 76536 US EXAM OF HEAD AND NECK: CPT | Performed by: OTOLARYNGOLOGY

## 2019-12-06 ENCOUNTER — APPOINTMENT (OUTPATIENT)
Dept: CARDIOLOGY | Age: 79
End: 2019-12-06

## 2019-12-13 ENCOUNTER — OFFICE VISIT (OUTPATIENT)
Dept: CARDIOLOGY | Age: 79
End: 2019-12-13

## 2019-12-13 VITALS
HEART RATE: 64 BPM | DIASTOLIC BLOOD PRESSURE: 78 MMHG | WEIGHT: 170 LBS | BODY MASS INDEX: 30.12 KG/M2 | SYSTOLIC BLOOD PRESSURE: 134 MMHG | HEIGHT: 63 IN

## 2019-12-13 DIAGNOSIS — E78.5 DYSLIPIDEMIA: ICD-10-CM

## 2019-12-13 DIAGNOSIS — I42.2 OTHER HYPERTROPHIC CARDIOMYOPATHY (CMD): ICD-10-CM

## 2019-12-13 DIAGNOSIS — Z95.810 ICD (IMPLANTABLE CARDIOVERTER-DEFIBRILLATOR) IN PLACE: ICD-10-CM

## 2019-12-13 DIAGNOSIS — E55.9 VITAMIN D DEFICIENCY: ICD-10-CM

## 2019-12-13 DIAGNOSIS — I65.23 ASYMPTOMATIC CAROTID ARTERY STENOSIS, BILATERAL: ICD-10-CM

## 2019-12-13 DIAGNOSIS — I10 ESSENTIAL HYPERTENSION: Primary | ICD-10-CM

## 2019-12-13 PROCEDURE — 99214 OFFICE O/P EST MOD 30 MIN: CPT | Performed by: CLINICAL NURSE SPECIALIST

## 2019-12-13 RX ORDER — OLMESARTAN MEDOXOMIL 5 MG/1
10 TABLET ORAL DAILY
Qty: 180 TABLET | Refills: 3 | Status: SHIPPED | OUTPATIENT
Start: 2019-12-13 | End: 2021-01-11

## 2019-12-13 SDOH — HEALTH STABILITY: PHYSICAL HEALTH: ON AVERAGE, HOW MANY MINUTES DO YOU ENGAGE IN EXERCISE AT THIS LEVEL?: 0 MIN

## 2019-12-13 SDOH — HEALTH STABILITY: PHYSICAL HEALTH: ON AVERAGE, HOW MANY DAYS PER WEEK DO YOU ENGAGE IN MODERATE TO STRENUOUS EXERCISE (LIKE A BRISK WALK)?: 0 DAYS

## 2019-12-13 ASSESSMENT — ENCOUNTER SYMPTOMS
ALLERGIC/IMMUNOLOGIC COMMENTS: NO NEW FOOD ALLERGIES
BRUISES/BLEEDS EASILY: 0
EYES NEGATIVE: 1
HEMATOCHEZIA: 0
RESPIRATORY NEGATIVE: 1
NEUROLOGICAL NEGATIVE: 1
CHILLS: 0
FEVER: 0
ENDOCRINE NEGATIVE: 1
WEIGHT LOSS: 0
SYNCOPE: 0
SHORTNESS OF BREATH: 0
GASTROINTESTINAL NEGATIVE: 1
COUGH: 0
WEIGHT GAIN: 0
CONSTITUTIONAL NEGATIVE: 1
SUSPICIOUS LESIONS: 0
HEMOPTYSIS: 0
HEMATOLOGIC/LYMPHATIC NEGATIVE: 1

## 2019-12-13 ASSESSMENT — PATIENT HEALTH QUESTIONNAIRE - PHQ9
2. FEELING DOWN, DEPRESSED OR HOPELESS: NOT AT ALL
SUM OF ALL RESPONSES TO PHQ9 QUESTIONS 1 AND 2: 0
SUM OF ALL RESPONSES TO PHQ9 QUESTIONS 1 AND 2: 0
1. LITTLE INTEREST OR PLEASURE IN DOING THINGS: NOT AT ALL

## 2019-12-15 ENCOUNTER — TELEPHONE (OUTPATIENT)
Dept: CARDIOLOGY | Age: 79
End: 2019-12-15

## 2019-12-17 RX ORDER — AMOXICILLIN 500 MG/1
TABLET, FILM COATED ORAL
Qty: 4 TABLET | Refills: 0 | OUTPATIENT
Start: 2019-12-17

## 2019-12-20 ENCOUNTER — E-ADVICE (OUTPATIENT)
Dept: CARDIOLOGY | Age: 79
End: 2019-12-20

## 2020-01-08 NOTE — PROGRESS NOTES
Responded to pt via My chart. Orders placed for B12 and syringes. Nothing further needed from MD or nurse. Closing encounter.

## 2020-01-10 ENCOUNTER — OFFICE VISIT (OUTPATIENT)
Dept: CARDIOLOGY | Age: 80
End: 2020-01-10

## 2020-01-10 VITALS
WEIGHT: 171.1 LBS | BODY MASS INDEX: 30.32 KG/M2 | SYSTOLIC BLOOD PRESSURE: 122 MMHG | DIASTOLIC BLOOD PRESSURE: 62 MMHG | HEART RATE: 50 BPM | HEIGHT: 63 IN

## 2020-01-10 DIAGNOSIS — Z95.810 ICD (IMPLANTABLE CARDIOVERTER-DEFIBRILLATOR) IN PLACE: Primary | ICD-10-CM

## 2020-01-10 PROCEDURE — 99215 OFFICE O/P EST HI 40 MIN: CPT | Performed by: INTERNAL MEDICINE

## 2020-01-10 ASSESSMENT — PATIENT HEALTH QUESTIONNAIRE - PHQ9
SUM OF ALL RESPONSES TO PHQ9 QUESTIONS 1 AND 2: 0
2. FEELING DOWN, DEPRESSED OR HOPELESS: NOT AT ALL
1. LITTLE INTEREST OR PLEASURE IN DOING THINGS: NOT AT ALL
SUM OF ALL RESPONSES TO PHQ9 QUESTIONS 1 AND 2: 0

## 2020-01-14 ENCOUNTER — LAB ENCOUNTER (OUTPATIENT)
Dept: LAB | Age: 80
End: 2020-01-14
Attending: INTERNAL MEDICINE
Payer: MEDICARE

## 2020-01-14 ENCOUNTER — HOSPITAL ENCOUNTER (OUTPATIENT)
Dept: CV DIAGNOSTICS | Age: 80
Discharge: HOME | End: 2020-01-14
Attending: INTERNAL MEDICINE
Payer: MEDICARE

## 2020-01-14 DIAGNOSIS — I65.23 BILATERAL CAROTID ARTERY STENOSIS: ICD-10-CM

## 2020-01-14 DIAGNOSIS — E55.9 VITAMIN D DEFICIENCY: Primary | ICD-10-CM

## 2020-01-14 LAB — VIT D+METAB SERPL-MCNC: 69.5 NG/ML (ref 30–100)

## 2020-01-14 PROCEDURE — 36415 COLL VENOUS BLD VENIPUNCTURE: CPT

## 2020-01-14 PROCEDURE — 82306 VITAMIN D 25 HYDROXY: CPT

## 2020-01-14 PROCEDURE — 93880 EXTRACRANIAL BILAT STUDY: CPT | Performed by: INTERNAL MEDICINE

## 2020-01-20 ENCOUNTER — E-ADVICE (OUTPATIENT)
Dept: CARDIOLOGY | Age: 80
End: 2020-01-20

## 2020-01-20 DIAGNOSIS — I65.23 BILATERAL CAROTID ARTERY STENOSIS: ICD-10-CM

## 2020-01-21 ENCOUNTER — E-ADVICE (OUTPATIENT)
Dept: CARDIOLOGY | Age: 80
End: 2020-01-21

## 2020-01-21 ENCOUNTER — TELEPHONE (OUTPATIENT)
Dept: CARDIOLOGY | Age: 80
End: 2020-01-21

## 2020-01-21 DIAGNOSIS — I10 HYPERTENSION, BENIGN: Primary | ICD-10-CM

## 2020-01-21 DIAGNOSIS — I65.23 ASYMPTOMATIC CAROTID ARTERY STENOSIS, BILATERAL: ICD-10-CM

## 2020-01-25 ENCOUNTER — ANCILLARY PROCEDURE (OUTPATIENT)
Dept: CARDIOLOGY | Age: 80
End: 2020-01-25
Attending: INTERNAL MEDICINE

## 2020-01-25 DIAGNOSIS — Z95.810 ICD (IMPLANTABLE CARDIOVERTER-DEFIBRILLATOR) IN PLACE: ICD-10-CM

## 2020-01-27 PROCEDURE — 93295 DEV INTERROG REMOTE 1/2/MLT: CPT | Performed by: INTERNAL MEDICINE

## 2020-02-21 ENCOUNTER — ANCILLARY PROCEDURE (OUTPATIENT)
Dept: CARDIOLOGY | Age: 80
End: 2020-02-21
Attending: INTERNAL MEDICINE

## 2020-02-21 VITALS
BODY MASS INDEX: 30.11 KG/M2 | HEART RATE: 60 BPM | SYSTOLIC BLOOD PRESSURE: 114 MMHG | DIASTOLIC BLOOD PRESSURE: 58 MMHG | WEIGHT: 170 LBS

## 2020-02-21 DIAGNOSIS — Z45.02 IMPLANTABLE DEFIBRILLATOR REPROGRAMMING/CHECK: ICD-10-CM

## 2020-02-21 PROCEDURE — 93283 PRGRMG EVAL IMPLANTABLE DFB: CPT | Performed by: INTERNAL MEDICINE

## 2020-02-21 PROCEDURE — 93290 INTERROG DEV EVAL ICPMS IP: CPT | Performed by: INTERNAL MEDICINE

## 2020-02-21 RX ORDER — CYANOCOBALAMIN 1000 UG/ML
INJECTION, SOLUTION INTRAMUSCULAR; SUBCUTANEOUS
Refills: 5 | COMMUNITY
Start: 2020-01-08 | End: 2020-06-22

## 2020-02-21 RX ORDER — ATORVASTATIN CALCIUM 40 MG/1
40 TABLET, FILM COATED ORAL DAILY
COMMUNITY
End: 2021-04-14 | Stop reason: SDUPTHER

## 2020-02-21 RX ORDER — METOPROLOL SUCCINATE 200 MG/1
200 TABLET, EXTENDED RELEASE ORAL DAILY
Refills: 2 | COMMUNITY
Start: 2020-02-03 | End: 2020-10-30 | Stop reason: SDUPTHER

## 2020-02-21 NOTE — TELEPHONE ENCOUNTER
Patient is requesting an order for a ultrasound to check a nodule on her thyroid. Patient is also requesting a refill on her prescription for LEVOTHYROXINE SODIUM 112 MCG Oral Tab.  Needs to be sent to [facility], [address] [facility] negative - no change in level of consciousness

## 2020-02-24 PROCEDURE — 93290 INTERROG DEV EVAL ICPMS IP: CPT | Performed by: INTERNAL MEDICINE

## 2020-02-24 PROCEDURE — 93283 PRGRMG EVAL IMPLANTABLE DFB: CPT | Performed by: INTERNAL MEDICINE

## 2020-05-07 ENCOUNTER — TELEPHONE (OUTPATIENT)
Dept: CARDIOLOGY | Age: 80
End: 2020-05-07

## 2020-05-26 ENCOUNTER — TELEPHONE (OUTPATIENT)
Dept: CARDIOLOGY | Age: 80
End: 2020-05-26

## 2020-06-01 ENCOUNTER — LAB ENCOUNTER (OUTPATIENT)
Dept: LAB | Age: 80
End: 2020-06-01
Attending: INTERNAL MEDICINE
Payer: MEDICARE

## 2020-06-01 DIAGNOSIS — I65.23 ASYMPTOMATIC CAROTID ARTERY STENOSIS, BILATERAL: ICD-10-CM

## 2020-06-01 DIAGNOSIS — I47.2 PAROXYSMAL VENTRICULAR TACHYCARDIA (HCC): ICD-10-CM

## 2020-06-01 DIAGNOSIS — R06.00 DYSPNEA, PAROXYSMAL NOCTURNAL: ICD-10-CM

## 2020-06-01 DIAGNOSIS — E78.00 PURE HYPERCHOLESTEROLEMIA: ICD-10-CM

## 2020-06-01 DIAGNOSIS — Z95.810 AUTOMATIC IMPLANTABLE CARDIAC DEFIBRILLATOR IN SITU: Primary | ICD-10-CM

## 2020-06-01 DIAGNOSIS — I42.2 PRIMARY HYPERTROPHIC CARDIOMYOPATHY (HCC): ICD-10-CM

## 2020-06-01 DIAGNOSIS — E78.00 HYPERCHOLESTEREMIA: ICD-10-CM

## 2020-06-01 DIAGNOSIS — E07.9 THYROID DYSFUNCTION: ICD-10-CM

## 2020-06-01 DIAGNOSIS — E53.8 LOW VITAMIN B12 LEVEL: ICD-10-CM

## 2020-06-01 DIAGNOSIS — E55.9 VITAMIN D DEFICIENCY: ICD-10-CM

## 2020-06-01 DIAGNOSIS — I10 ESSENTIAL HYPERTENSION, MALIGNANT: ICD-10-CM

## 2020-06-01 DIAGNOSIS — Z12.39 BREAST CANCER SCREENING: ICD-10-CM

## 2020-06-01 PROCEDURE — 82306 VITAMIN D 25 HYDROXY: CPT

## 2020-06-01 PROCEDURE — 84443 ASSAY THYROID STIM HORMONE: CPT

## 2020-06-01 PROCEDURE — 83880 ASSAY OF NATRIURETIC PEPTIDE: CPT

## 2020-06-01 PROCEDURE — 83036 HEMOGLOBIN GLYCOSYLATED A1C: CPT

## 2020-06-01 PROCEDURE — 85027 COMPLETE CBC AUTOMATED: CPT

## 2020-06-01 PROCEDURE — 36415 COLL VENOUS BLD VENIPUNCTURE: CPT

## 2020-06-01 PROCEDURE — 82607 VITAMIN B-12: CPT

## 2020-06-01 PROCEDURE — 80061 LIPID PANEL: CPT

## 2020-06-01 PROCEDURE — 80053 COMPREHEN METABOLIC PANEL: CPT

## 2020-06-02 ENCOUNTER — E-ADVICE (OUTPATIENT)
Dept: CARDIOLOGY | Age: 80
End: 2020-06-02

## 2020-06-08 NOTE — PROGRESS NOTES
Pt has recently viewed results on [employer], along with provider's comments.   Future Appointments  6/18/2020  1:30 PM    MD Temitope Morelos        [facility]  9/23/2020  12:00 PM   Tunde Hernandez MD           [facility] [place]

## 2020-06-17 ENCOUNTER — TELEPHONE (OUTPATIENT)
Dept: CARDIOLOGY | Age: 80
End: 2020-06-17

## 2020-06-18 ENCOUNTER — OFFICE VISIT (OUTPATIENT)
Dept: PAIN CLINIC | Facility: CLINIC | Age: 80
End: 2020-06-18
Payer: MEDICARE

## 2020-06-18 ENCOUNTER — E-ADVICE (OUTPATIENT)
Dept: CARDIOLOGY | Age: 80
End: 2020-06-18

## 2020-06-18 VITALS — BODY MASS INDEX: 31.28 KG/M2 | WEIGHT: 170 LBS | HEIGHT: 62 IN

## 2020-06-18 DIAGNOSIS — G56.02 CARPAL TUNNEL SYNDROME OF LEFT WRIST: Primary | ICD-10-CM

## 2020-06-18 PROCEDURE — 99214 OFFICE O/P EST MOD 30 MIN: CPT | Performed by: ANESTHESIOLOGY

## 2020-06-18 NOTE — PROGRESS NOTES
Patient presents in office today with reported pain in left arm     Current pain level reported = 0/10

## 2020-06-18 NOTE — PROGRESS NOTES
Name: Adela Vazquez   : 1940   DOS: 2020     Pain Clinic Follow Up Visit:   Sofiya Macke Lauri is a 70year old female who presents for recheck of her chronic wrist pain and forearm pain.  She is status post bilateral median nerve blo tablet by mouth nightly as needed. EXAM:   Ht 62\"   Wt 170 lb (77.1 kg)   BMI 31.09 kg/m²   Moderate tenderness over the left wrist.  Tinel and Phalen test is positive for carpal tunnel syndrome.   Motor 5 out of 5, sensory is intact and reflex

## 2020-06-18 NOTE — PATIENT INSTRUCTIONS
Refill policies:    • Allow 2-3 business days for refills; controlled substances may take longer.   • Contact your pharmacy at least 5 days prior to running out of medication and have them send an electronic request or submit request through the “request re Depending on your insurance carrier, approval may take 3-10 days. It is highly recommended patients contact their insurance carrier directly to determine coverage.   If test is done without insurance authorization, patient may be responsible for the entire 07/02/2020  Appointment Time:1:00pm  Physician: Dr. Ana Luisa Kahn    ** TO AVOID CANCELLATION AND/OR RESCHEDULING: PLEASE CALL [facility] PRE-PROCEDURE LINE -[phone] FOR DETAILED INSTRUCTIONS FIVE TO SEVEN DAYS PRIOR TO PROCEDURE**      Location: [facility] procedure within 48 hours of the scheduled date, your procedure will be cancelled and rescheduled to a later date. Please contact your insurance carrier to determine what your financial  responsibility will be for the procedure(s).     Cancellation/Resche

## 2020-06-25 ENCOUNTER — APPOINTMENT (OUTPATIENT)
Dept: CARDIOLOGY | Age: 80
End: 2020-06-25

## 2020-06-25 ENCOUNTER — V-VISIT (OUTPATIENT)
Dept: CARDIOLOGY | Age: 80
End: 2020-06-25

## 2020-06-25 VITALS — HEART RATE: 64 BPM | DIASTOLIC BLOOD PRESSURE: 61 MMHG | SYSTOLIC BLOOD PRESSURE: 108 MMHG

## 2020-06-25 DIAGNOSIS — I65.23 ASYMPTOMATIC CAROTID ARTERY STENOSIS, BILATERAL: ICD-10-CM

## 2020-06-25 DIAGNOSIS — I10 HYPERTENSION, BENIGN: Primary | ICD-10-CM

## 2020-06-25 DIAGNOSIS — R93.1 ELEVATED CORONARY ARTERY CALCIUM SCORE: ICD-10-CM

## 2020-06-25 DIAGNOSIS — I42.2 OTHER HYPERTROPHIC CARDIOMYOPATHY (CMD): ICD-10-CM

## 2020-06-25 DIAGNOSIS — I47.20 VENTRICULAR TACHYCARDIA (CMD): ICD-10-CM

## 2020-06-25 DIAGNOSIS — E78.00 PURE HYPERCHOLESTEROLEMIA: ICD-10-CM

## 2020-06-25 DIAGNOSIS — E55.9 VITAMIN D DEFICIENCY: ICD-10-CM

## 2020-06-25 DIAGNOSIS — Z95.810 ICD (IMPLANTABLE CARDIOVERTER-DEFIBRILLATOR) IN PLACE: ICD-10-CM

## 2020-06-25 DIAGNOSIS — G56.03 CARPAL TUNNEL SYNDROME ON BOTH SIDES: ICD-10-CM

## 2020-06-25 DIAGNOSIS — I10 ESSENTIAL HYPERTENSION: ICD-10-CM

## 2020-06-25 DIAGNOSIS — E78.5 DYSLIPIDEMIA: ICD-10-CM

## 2020-06-25 PROCEDURE — 99214 OFFICE O/P EST MOD 30 MIN: CPT | Performed by: INTERNAL MEDICINE

## 2020-06-25 RX ORDER — VITAMIN B COMPLEX
TABLET ORAL DAILY
COMMUNITY
End: 2020-11-12 | Stop reason: DRUGHIGH

## 2020-06-25 ASSESSMENT — ENCOUNTER SYMPTOMS
CHILLS: 0
FEVER: 0
WEIGHT LOSS: 0
SHORTNESS OF BREATH: 1
COUGH: 0
HEMOPTYSIS: 0
DIAPHORESIS: 1
ALLERGIC/IMMUNOLOGIC COMMENTS: NO NEW FOOD ALLERGIES
BRUISES/BLEEDS EASILY: 0
SUSPICIOUS LESIONS: 0
WEIGHT GAIN: 0
HEMATOCHEZIA: 0

## 2020-06-25 ASSESSMENT — PATIENT HEALTH QUESTIONNAIRE - PHQ9
SUM OF ALL RESPONSES TO PHQ9 QUESTIONS 1 AND 2: 0
CLINICAL INTERPRETATION OF PHQ2 SCORE: NO FURTHER SCREENING NEEDED
2. FEELING DOWN, DEPRESSED OR HOPELESS: NOT AT ALL
CLINICAL INTERPRETATION OF PHQ9 SCORE: NO FURTHER SCREENING NEEDED
SUM OF ALL RESPONSES TO PHQ9 QUESTIONS 1 AND 2: 0
1. LITTLE INTEREST OR PLEASURE IN DOING THINGS: NOT AT ALL

## 2020-07-02 ENCOUNTER — OFFICE VISIT (OUTPATIENT)
Dept: PAIN CLINIC | Facility: CLINIC | Age: 80
End: 2020-07-02
Payer: MEDICARE

## 2020-07-02 VITALS — WEIGHT: 170 LBS | HEIGHT: 62 IN | BODY MASS INDEX: 31.28 KG/M2

## 2020-07-02 DIAGNOSIS — G56.02 CARPAL TUNNEL SYNDROME OF LEFT WRIST: Primary | ICD-10-CM

## 2020-07-02 PROCEDURE — 76942 ECHO GUIDE FOR BIOPSY: CPT | Performed by: ANESTHESIOLOGY

## 2020-07-02 PROCEDURE — 64450 NJX AA&/STRD OTHER PN/BRANCH: CPT | Performed by: ANESTHESIOLOGY

## 2020-07-02 RX ORDER — VITAMIN B COMPLEX
TABLET ORAL DAILY
COMMUNITY
End: 2020-11-06

## 2020-07-02 RX ORDER — LIDOCAINE HYDROCHLORIDE 10 MG/ML
5 INJECTION, SOLUTION INFILTRATION; PERINEURAL ONCE
Status: COMPLETED | OUTPATIENT
Start: 2020-07-02 | End: 2020-07-02

## 2020-07-02 RX ORDER — METHYLPREDNISOLONE ACETATE 40 MG/ML
40 INJECTION, SUSPENSION INTRA-ARTICULAR; INTRALESIONAL; INTRAMUSCULAR; SOFT TISSUE ONCE
Status: COMPLETED | OUTPATIENT
Start: 2020-07-02 | End: 2020-07-02

## 2020-07-02 NOTE — PROGRESS NOTES
Timeout completed prior to procedure @ 1742. Participants present for timeout:  Dr. Chelsey Paul, and patient. Pt states she held ASA 81 for 24+ hours.

## 2020-07-02 NOTE — PROGRESS NOTES
Patient presents in office today with reported pain in left arm    Current pain level reported = 2/10

## 2020-07-06 NOTE — PROCEDURES
[facility]  Operative Report  2020     Sofiya Canales Patient Status:  No patient class for patient encounter    1940 MRN [id number]   Location ED [facility], [address], [address] Attending No att. providers found   [address] [facility] procedure very well.  The patient had complete understanding of the risks and benefits of the procedure.        Complications: None.     Follow up:  The patient will be followed in the pain clinic as needed basis.  Devin Danielle MD

## 2020-07-23 ENCOUNTER — APPOINTMENT (OUTPATIENT)
Dept: CARDIOLOGY | Age: 80
End: 2020-07-23

## 2020-07-27 ENCOUNTER — ANCILLARY PROCEDURE (OUTPATIENT)
Dept: CARDIOLOGY | Age: 80
End: 2020-07-27
Attending: INTERNAL MEDICINE

## 2020-07-27 ENCOUNTER — ANCILLARY ORDERS (OUTPATIENT)
Dept: CARDIOLOGY | Age: 80
End: 2020-07-27

## 2020-07-27 DIAGNOSIS — Z95.810 ICD (IMPLANTABLE CARDIOVERTER-DEFIBRILLATOR) IN PLACE: ICD-10-CM

## 2020-07-27 PROCEDURE — X1114 CARDIAC DEVICE HOME CHECK - REMOTE UNSCHEDULED: HCPCS | Performed by: INTERNAL MEDICINE

## 2020-07-28 PROCEDURE — 93295 DEV INTERROG REMOTE 1/2/MLT: CPT | Performed by: INTERNAL MEDICINE

## 2020-09-10 ENCOUNTER — LAB ENCOUNTER (OUTPATIENT)
Dept: LAB | Age: 80
End: 2020-09-10
Attending: INTERNAL MEDICINE
Payer: MEDICARE

## 2020-09-10 DIAGNOSIS — I47.2 PAROXYSMAL VENTRICULAR TACHYCARDIA (HCC): ICD-10-CM

## 2020-09-10 DIAGNOSIS — I42.2 PRIMARY HYPERTROPHIC CARDIOMYOPATHY (HCC): ICD-10-CM

## 2020-09-10 DIAGNOSIS — I10 ESSENTIAL HYPERTENSION, MALIGNANT: Primary | ICD-10-CM

## 2020-09-10 DIAGNOSIS — Z95.810 AUTOMATIC IMPLANTABLE CARDIAC DEFIBRILLATOR IN SITU: ICD-10-CM

## 2020-09-10 DIAGNOSIS — G56.03 CARPAL TUNNEL SYNDROME, BILATERAL: ICD-10-CM

## 2020-09-10 DIAGNOSIS — R93.1 ABNORMAL ECHOCARDIOGRAM: ICD-10-CM

## 2020-09-10 DIAGNOSIS — I65.23 BILATERAL CAROTID ARTERY STENOSIS: ICD-10-CM

## 2020-09-10 DIAGNOSIS — E78.00 PURE HYPERCHOLESTEROLEMIA: ICD-10-CM

## 2020-09-10 LAB — VIT D+METAB SERPL-MCNC: 22.5 NG/ML (ref 30–100)

## 2020-09-10 PROCEDURE — 83883 ASSAY NEPHELOMETRY NOT SPEC: CPT

## 2020-09-10 PROCEDURE — 36415 COLL VENOUS BLD VENIPUNCTURE: CPT

## 2020-09-10 PROCEDURE — 83520 IMMUNOASSAY QUANT NOS NONAB: CPT

## 2020-09-10 PROCEDURE — 84156 ASSAY OF PROTEIN URINE: CPT

## 2020-09-10 PROCEDURE — 82306 VITAMIN D 25 HYDROXY: CPT

## 2020-09-10 PROCEDURE — 86335 IMMUNFIX E-PHORSIS/URINE/CSF: CPT

## 2020-09-13 LAB
FREE UR LAMBDA LIGHT CHAIN: <0.74 MG/L
FREE URINARY KAPPA LIGHT CHAIN: 4.6 MG/L
KAPPA QNT FREE LIGHT CHAINS: 26.78 MG/L
KAPPA/LAMBDA FREE LIGHT CHAIN RATIO: 1.57
LAMBDA QNT FREE LIGHT CHAINS: 17.07 MG/L

## 2020-09-14 ENCOUNTER — TELEPHONE (OUTPATIENT)
Dept: CARDIOLOGY | Age: 80
End: 2020-09-14

## 2020-09-14 DIAGNOSIS — I42.2 OTHER HYPERTROPHIC CARDIOMYOPATHY (CMD): ICD-10-CM

## 2020-09-14 DIAGNOSIS — Z95.810 ICD (IMPLANTABLE CARDIOVERTER-DEFIBRILLATOR) IN PLACE: ICD-10-CM

## 2020-09-14 DIAGNOSIS — R55 SYNCOPE, UNSPECIFIED SYNCOPE TYPE: ICD-10-CM

## 2020-09-14 DIAGNOSIS — E78.5 DYSLIPIDEMIA: Primary | ICD-10-CM

## 2020-09-14 DIAGNOSIS — R93.1 ELEVATED CORONARY ARTERY CALCIUM SCORE: ICD-10-CM

## 2020-09-14 DIAGNOSIS — I65.23 ASYMPTOMATIC CAROTID ARTERY STENOSIS, BILATERAL: ICD-10-CM

## 2020-09-14 DIAGNOSIS — R06.09 DYSPNEA ON EXERTION: ICD-10-CM

## 2020-09-14 DIAGNOSIS — I10 HYPERTENSION, BENIGN: ICD-10-CM

## 2020-09-14 DIAGNOSIS — I10 ESSENTIAL HYPERTENSION: ICD-10-CM

## 2020-09-14 DIAGNOSIS — R06.00 DYSPNEA, UNSPECIFIED TYPE: ICD-10-CM

## 2020-09-14 DIAGNOSIS — I47.20 VENTRICULAR TACHYCARDIA (CMD): ICD-10-CM

## 2020-09-14 DIAGNOSIS — I50.9 CHRONIC CONGESTIVE HEART FAILURE, UNSPECIFIED HEART FAILURE TYPE (CMD): ICD-10-CM

## 2020-09-14 DIAGNOSIS — E55.9 VITAMIN D DEFICIENCY: ICD-10-CM

## 2020-09-14 DIAGNOSIS — E11.69 TYPE 2 DIABETES MELLITUS WITH OTHER SPECIFIED COMPLICATION, UNSPECIFIED WHETHER LONG TERM INSULIN USE (CMD): ICD-10-CM

## 2020-09-14 DIAGNOSIS — E78.00 PURE HYPERCHOLESTEROLEMIA: ICD-10-CM

## 2020-09-14 DIAGNOSIS — R06.02 SHORTNESS OF BREATH: ICD-10-CM

## 2020-09-15 RX ORDER — ERGOCALCIFEROL 1.25 MG/1
50000 CAPSULE ORAL
Qty: 12 CAPSULE | Refills: 0 | Status: SHIPPED | OUTPATIENT
Start: 2020-09-21 | End: 2020-11-30

## 2020-09-15 RX ORDER — ERGOCALCIFEROL 1.25 MG/1
50000 CAPSULE ORAL
COMMUNITY
End: 2020-09-15 | Stop reason: SDUPTHER

## 2020-09-18 ENCOUNTER — TELEPHONE (OUTPATIENT)
Dept: CARDIOLOGY | Age: 80
End: 2020-09-18

## 2020-09-18 ENCOUNTER — LAB ENCOUNTER (OUTPATIENT)
Dept: LAB | Age: 80
End: 2020-09-18
Attending: INTERNAL MEDICINE
Payer: MEDICARE

## 2020-09-18 DIAGNOSIS — Z01.810 PRE-OPERATIVE CARDIOVASCULAR EXAMINATION, ICD IN PLACE: ICD-10-CM

## 2020-09-18 DIAGNOSIS — I47.2 VT (VENTRICULAR TACHYCARDIA) (HCC): ICD-10-CM

## 2020-09-18 DIAGNOSIS — I65.23 CAROTID STENOSIS, BILATERAL: ICD-10-CM

## 2020-09-18 DIAGNOSIS — I10 ESSENTIAL HYPERTENSION, BENIGN: ICD-10-CM

## 2020-09-18 DIAGNOSIS — R55 SYNCOPE: ICD-10-CM

## 2020-09-18 DIAGNOSIS — E78.00 PURE HYPERCHOLESTEROLEMIA: ICD-10-CM

## 2020-09-18 DIAGNOSIS — I42.2 PRIMARY HYPERTROPHIC CARDIOMYOPATHY (HCC): ICD-10-CM

## 2020-09-18 DIAGNOSIS — R93.1 ELEVATED CORONARY ARTERY CALCIUM SCORE: ICD-10-CM

## 2020-09-18 DIAGNOSIS — E78.5 HYPERLIPEMIA: Primary | ICD-10-CM

## 2020-09-18 DIAGNOSIS — Z95.810 PRE-OPERATIVE CARDIOVASCULAR EXAMINATION, ICD IN PLACE: ICD-10-CM

## 2020-09-18 LAB
T4 FREE SERPL-MCNC: 1.3 NG/DL (ref 0.8–1.7)
TSI SER-ACNC: 1.79 MIU/ML (ref 0.36–3.74)

## 2020-09-18 PROCEDURE — 83880 ASSAY OF NATRIURETIC PEPTIDE: CPT

## 2020-09-18 PROCEDURE — 84443 ASSAY THYROID STIM HORMONE: CPT

## 2020-09-18 PROCEDURE — 84439 ASSAY OF FREE THYROXINE: CPT

## 2020-09-18 PROCEDURE — 80061 LIPID PANEL: CPT

## 2020-09-18 PROCEDURE — 36415 COLL VENOUS BLD VENIPUNCTURE: CPT

## 2020-09-18 PROCEDURE — 80053 COMPREHEN METABOLIC PANEL: CPT

## 2020-09-18 PROCEDURE — 83036 HEMOGLOBIN GLYCOSYLATED A1C: CPT

## 2020-09-18 PROCEDURE — 85025 COMPLETE CBC W/AUTO DIFF WBC: CPT

## 2020-09-21 NOTE — PROGRESS NOTES
Katelyn Benjamin MD  P Ent Nurse Pool         Thyroid labs look good. Continue Levothyroxine and follow up me January 2021 . Left detailed result note message on vm .

## 2020-09-25 ENCOUNTER — OFFICE VISIT (OUTPATIENT)
Dept: CARDIOLOGY | Age: 80
End: 2020-09-25

## 2020-09-25 ENCOUNTER — HOSPITAL ENCOUNTER (OUTPATIENT)
Dept: LAB | Facility: HOSPITAL | Age: 80
Discharge: HOME | End: 2020-09-25
Attending: INTERNAL MEDICINE
Payer: MEDICARE

## 2020-09-25 VITALS
BODY MASS INDEX: 32.6 KG/M2 | DIASTOLIC BLOOD PRESSURE: 66 MMHG | WEIGHT: 184 LBS | SYSTOLIC BLOOD PRESSURE: 120 MMHG | HEIGHT: 63 IN | HEART RATE: 60 BPM

## 2020-09-25 DIAGNOSIS — C80.1 CANCER (CMD): ICD-10-CM

## 2020-09-25 DIAGNOSIS — R93.1 ELEVATED CORONARY ARTERY CALCIUM SCORE: ICD-10-CM

## 2020-09-25 DIAGNOSIS — I10 ESSENTIAL HYPERTENSION: ICD-10-CM

## 2020-09-25 DIAGNOSIS — R06.00 DYSPNEA, UNSPECIFIED TYPE: ICD-10-CM

## 2020-09-25 DIAGNOSIS — Z95.810 ICD (IMPLANTABLE CARDIOVERTER-DEFIBRILLATOR) IN PLACE: ICD-10-CM

## 2020-09-25 DIAGNOSIS — R55 SYNCOPE, UNSPECIFIED SYNCOPE TYPE: ICD-10-CM

## 2020-09-25 DIAGNOSIS — E55.9 VITAMIN D DEFICIENCY, UNSPECIFIED: ICD-10-CM

## 2020-09-25 DIAGNOSIS — E78.00 PURE HYPERCHOLESTEROLEMIA: ICD-10-CM

## 2020-09-25 DIAGNOSIS — I50.9 EDEMA DUE TO CONGESTIVE HEART FAILURE (CMD): ICD-10-CM

## 2020-09-25 DIAGNOSIS — I10 HYPERTENSION, BENIGN: ICD-10-CM

## 2020-09-25 DIAGNOSIS — E03.9 HYPOTHYROIDISM, UNSPECIFIED TYPE: ICD-10-CM

## 2020-09-25 DIAGNOSIS — I42.2 OTHER HYPERTROPHIC CARDIOMYOPATHY (CMD): ICD-10-CM

## 2020-09-25 DIAGNOSIS — E11.9 TYPE 2 DIABETES MELLITUS WITHOUT COMPLICATION, UNSPECIFIED WHETHER LONG TERM INSULIN USE (CMD): Primary | ICD-10-CM

## 2020-09-25 DIAGNOSIS — I47.20 VENTRICULAR TACHYCARDIA (CMD): ICD-10-CM

## 2020-09-25 LAB
IGA SERPL-MCNC: 379 MG/DL (ref 70–312)
IGM SERPL-MCNC: 52.8 MG/DL (ref 43–279)
IMMUNOGLOBULIN PNL SER-MCNC: 1150 MG/DL (ref 791–1643)

## 2020-09-25 PROCEDURE — 83883 ASSAY NEPHELOMETRY NOT SPEC: CPT | Performed by: INTERNAL MEDICINE

## 2020-09-25 PROCEDURE — 84165 PROTEIN E-PHORESIS SERUM: CPT | Performed by: INTERNAL MEDICINE

## 2020-09-25 PROCEDURE — 82784 ASSAY IGA/IGD/IGG/IGM EACH: CPT | Performed by: INTERNAL MEDICINE

## 2020-09-25 PROCEDURE — 36415 COLL VENOUS BLD VENIPUNCTURE: CPT | Performed by: INTERNAL MEDICINE

## 2020-09-25 PROCEDURE — 84156 ASSAY OF PROTEIN URINE: CPT | Performed by: INTERNAL MEDICINE

## 2020-09-25 PROCEDURE — 86334 IMMUNOFIX E-PHORESIS SERUM: CPT | Performed by: INTERNAL MEDICINE

## 2020-09-25 PROCEDURE — 86335 IMMUNFIX E-PHORSIS/URINE/CSF: CPT | Performed by: INTERNAL MEDICINE

## 2020-09-25 PROCEDURE — 99214 OFFICE O/P EST MOD 30 MIN: CPT | Performed by: INTERNAL MEDICINE

## 2020-09-25 ASSESSMENT — ENCOUNTER SYMPTOMS
WEIGHT LOSS: 0
BRUISES/BLEEDS EASILY: 0
WEIGHT GAIN: 1
SUSPICIOUS LESIONS: 0
ALLERGIC/IMMUNOLOGIC COMMENTS: NO NEW FOOD ALLERGIES
FEVER: 0
NUMBNESS: 1
HEMOPTYSIS: 0
COUGH: 0
HEMATOCHEZIA: 0
CHILLS: 0

## 2020-09-25 ASSESSMENT — PATIENT HEALTH QUESTIONNAIRE - PHQ9
CLINICAL INTERPRETATION OF PHQ2 SCORE: NO FURTHER SCREENING NEEDED
1. LITTLE INTEREST OR PLEASURE IN DOING THINGS: SEVERAL DAYS
SUM OF ALL RESPONSES TO PHQ9 QUESTIONS 1 AND 2: 2
CLINICAL INTERPRETATION OF PHQ9 SCORE: NO FURTHER SCREENING NEEDED
SUM OF ALL RESPONSES TO PHQ9 QUESTIONS 1 AND 2: 2
2. FEELING DOWN, DEPRESSED OR HOPELESS: SEVERAL DAYS

## 2020-09-29 ENCOUNTER — TELEPHONE (OUTPATIENT)
Dept: CARDIOLOGY | Age: 80
End: 2020-09-29

## 2020-09-29 DIAGNOSIS — I10 HYPERTENSION, BENIGN: ICD-10-CM

## 2020-09-29 DIAGNOSIS — R55 SYNCOPE, UNSPECIFIED SYNCOPE TYPE: ICD-10-CM

## 2020-09-29 DIAGNOSIS — I42.2 OTHER HYPERTROPHIC CARDIOMYOPATHY (CMD): Primary | ICD-10-CM

## 2020-09-29 DIAGNOSIS — R93.1 ELEVATED CORONARY ARTERY CALCIUM SCORE: ICD-10-CM

## 2020-09-29 DIAGNOSIS — I10 ESSENTIAL HYPERTENSION: ICD-10-CM

## 2020-09-29 DIAGNOSIS — E78.00 PURE HYPERCHOLESTEROLEMIA: ICD-10-CM

## 2020-09-29 DIAGNOSIS — I47.20 VENTRICULAR TACHYCARDIA (CMD): ICD-10-CM

## 2020-09-29 DIAGNOSIS — Z95.810 ICD (IMPLANTABLE CARDIOVERTER-DEFIBRILLATOR) IN PLACE: ICD-10-CM

## 2020-09-29 LAB
ALBUMIN SERPL ELPH-MCNC: 4.03 G/DL (ref 3.75–5.21)
ALBUMIN/GLOB SERPL: 1.23 {RATIO} (ref 1–2)
ALPHA1 GLOB SERPL ELPH-MCNC: 0.34 G/DL (ref 0.19–0.46)
ALPHA2 GLOB SERPL ELPH-MCNC: 0.86 G/DL (ref 0.48–1.05)
B-GLOBULIN SERPL ELPH-MCNC: 1.01 G/DL (ref 0.68–1.23)
FREE UR LAMBDA LIGHT CHAIN: <0.74 MG/L
FREE URINARY KAPPA LIGHT CHAIN: 2.79 MG/L
GAMMA GLOB SERPL ELPH-MCNC: 1.06 G/DL (ref 0.62–1.7)
KAPPA LC FREE SER-MCNC: 2.24 MG/DL (ref 0.33–1.94)
KAPPA LC FREE/LAMBDA FREE SER NEPH: 1.79 {RATIO} (ref 0.26–1.65)
LAMBDA LC FREE SERPL-MCNC: 1.25 MG/DL (ref 0.57–2.63)
M PROTEIN MFR SERPL ELPH: 7.3 G/DL (ref 6.4–8.2)

## 2020-10-06 ENCOUNTER — HOSPITAL ENCOUNTER (OUTPATIENT)
Dept: CV DIAGNOSTICS | Facility: HOSPITAL | Age: 80
Discharge: HOME | End: 2020-10-06
Attending: INTERNAL MEDICINE
Payer: MEDICARE

## 2020-10-06 DIAGNOSIS — Z95.810 ICD (IMPLANTABLE CARDIOVERTER-DEFIBRILLATOR) IN PLACE: ICD-10-CM

## 2020-10-06 DIAGNOSIS — I10 HYPERTENSION, ESSENTIAL: ICD-10-CM

## 2020-10-06 DIAGNOSIS — I10 BENIGN HYPERTENSION: ICD-10-CM

## 2020-10-06 DIAGNOSIS — R93.1 ELEVATED CORONARY ARTERY CALCIUM SCORE: ICD-10-CM

## 2020-10-06 DIAGNOSIS — R55 SYNCOPE, UNSPECIFIED SYNCOPE TYPE: ICD-10-CM

## 2020-10-06 DIAGNOSIS — E78.00 PURE HYPERCHOLESTEROLEMIA: ICD-10-CM

## 2020-10-06 DIAGNOSIS — I47.2 VENTRICULAR TACHYCARDIA (HCC): ICD-10-CM

## 2020-10-06 DIAGNOSIS — I42.2 OTHER HYPERTROPHIC CARDIOMYOPATHY (HCC): ICD-10-CM

## 2020-10-06 PROCEDURE — 93306 TTE W/DOPPLER COMPLETE: CPT | Performed by: INTERNAL MEDICINE

## 2020-10-13 ENCOUNTER — TELEPHONE (OUTPATIENT)
Dept: CARDIOLOGY | Age: 80
End: 2020-10-13

## 2020-10-27 ENCOUNTER — ANCILLARY PROCEDURE (OUTPATIENT)
Dept: CARDIOLOGY | Age: 80
End: 2020-10-27
Attending: INTERNAL MEDICINE

## 2020-10-27 ENCOUNTER — ANCILLARY ORDERS (OUTPATIENT)
Dept: CARDIOLOGY | Age: 80
End: 2020-10-27

## 2020-10-27 DIAGNOSIS — Z95.810 ICD (IMPLANTABLE CARDIOVERTER-DEFIBRILLATOR) IN PLACE: ICD-10-CM

## 2020-10-27 PROCEDURE — X1094 NO CHARGE VISIT: HCPCS | Performed by: INTERNAL MEDICINE

## 2020-10-27 PROCEDURE — X1114 CARDIAC DEVICE HOME CHECK - REMOTE UNSCHEDULED: HCPCS | Performed by: INTERNAL MEDICINE

## 2020-10-30 ENCOUNTER — TELEPHONE (OUTPATIENT)
Dept: CARDIOLOGY | Age: 80
End: 2020-10-30

## 2020-10-30 RX ORDER — METOPROLOL SUCCINATE 200 MG/1
200 TABLET, EXTENDED RELEASE ORAL DAILY
Qty: 90 TABLET | Refills: 1 | Status: SHIPPED | OUTPATIENT
Start: 2020-10-30 | End: 2021-04-14 | Stop reason: SDUPTHER

## 2020-11-06 ENCOUNTER — OFFICE VISIT (OUTPATIENT)
Dept: INTERNAL MEDICINE CLINIC | Facility: CLINIC | Age: 80
End: 2020-11-06
Payer: MEDICARE

## 2020-11-06 VITALS
BODY MASS INDEX: 33.13 KG/M2 | DIASTOLIC BLOOD PRESSURE: 62 MMHG | TEMPERATURE: 98 F | WEIGHT: 180 LBS | SYSTOLIC BLOOD PRESSURE: 118 MMHG | HEART RATE: 60 BPM | RESPIRATION RATE: 16 BRPM | HEIGHT: 62 IN

## 2020-11-06 DIAGNOSIS — I10 ESSENTIAL HYPERTENSION: ICD-10-CM

## 2020-11-06 DIAGNOSIS — E03.9 HYPOTHYROIDISM, UNSPECIFIED TYPE: ICD-10-CM

## 2020-11-06 DIAGNOSIS — E78.00 HYPERCHOLESTEREMIA: ICD-10-CM

## 2020-11-06 DIAGNOSIS — R63.5 WEIGHT GAIN: Primary | ICD-10-CM

## 2020-11-06 DIAGNOSIS — R73.9 BLOOD GLUCOSE ELEVATED: ICD-10-CM

## 2020-11-06 PROCEDURE — 99203 OFFICE O/P NEW LOW 30 MIN: CPT | Performed by: INTERNAL MEDICINE

## 2020-11-06 NOTE — PROGRESS NOTES
Sofiya Lebron is a 68year old female.   Patient presents with:  New Patient: rm 3 DO:Establish care, no refills, flu shot utd, 0 depression, occasional drinker, non-smoker      HPI:     Patient is new to me-  She is , takes care of her husba mouth daily with breakfast. 30 tablet 2   • DULOXETINE HCL 60 MG Oral Cap DR Particles TAKE 1 CAPSULE(60 MG) BY MOUTH EVERY DAY 90 capsule 0   • LEVOTHYROXINE SODIUM 100 MCG Oral Tab TAKE 1 TABLET BY MOUTH EVERY MORNING BEFORE BREAKFAST 90 tablet 0   • Asp HEALTH:  no fevers   RESPIRATORY: no cough  CARDIOVASCULAR: denies chest pain  GI: denies abdominal pain  : no dysuria  NEURO: denies headaches  PSYCH: No reported depression- on duloxetine for years  HEME: No adenopathy      EXAM:   /62   Pulse 60

## 2020-11-10 ENCOUNTER — TELEPHONE (OUTPATIENT)
Dept: PAIN CLINIC | Facility: CLINIC | Age: 80
End: 2020-11-10

## 2020-11-10 ENCOUNTER — OFFICE VISIT (OUTPATIENT)
Dept: PAIN CLINIC | Facility: CLINIC | Age: 80
End: 2020-11-10
Payer: MEDICARE

## 2020-11-10 VITALS
WEIGHT: 180 LBS | SYSTOLIC BLOOD PRESSURE: 130 MMHG | DIASTOLIC BLOOD PRESSURE: 70 MMHG | BODY MASS INDEX: 33 KG/M2 | HEART RATE: 64 BPM

## 2020-11-10 DIAGNOSIS — G56.03 BILATERAL CARPAL TUNNEL SYNDROME: Primary | ICD-10-CM

## 2020-11-10 PROCEDURE — 99213 OFFICE O/P EST LOW 20 MIN: CPT | Performed by: NURSE PRACTITIONER

## 2020-11-10 NOTE — TELEPHONE ENCOUNTER
IN OFFICE INJECTION    Medical clearance needed- No    Implanted cardiac device/SCS/PNS: NA    Pt seen in OV today by BENJAMIN Contreras and recommended for median nerve block (X 2) with Dr. Hodan Will.      Laterality: Right followed by Left  Level(s): NA    All p

## 2020-11-10 NOTE — PROGRESS NOTES
Patient here to discuss carpel tunnel injections in right hand. Last Right median nerve block ultrasound guidance completed 9/18/19. Per patient reports 100 % relief and lasted for a year.

## 2020-11-10 NOTE — TELEPHONE ENCOUNTER
Patient recommended for right and left median nerve blocks however patient elects to schedule the right only at this time and stated she will call back to schedule the left when ready to do so.  Patient scheduled for procedure, pre-procedure instructions re Ibuprofen (Motrin, Advil, Vicoprofen), Naproxen (Naprosyn, Aleve), Piroxcam (Feldene), Meloxicam (Mobic)--(Cervical procedures will require 3 days), Oxaprozin (Daypro), Diclofenac (Voltaren), Indomethacin (Indocin), Etodolac (Lodine), Nabumetone (Relafen

## 2020-11-12 ENCOUNTER — OFFICE VISIT (OUTPATIENT)
Dept: CARDIOLOGY | Age: 80
End: 2020-11-12

## 2020-11-12 VITALS
HEART RATE: 64 BPM | HEIGHT: 63 IN | BODY MASS INDEX: 31.18 KG/M2 | DIASTOLIC BLOOD PRESSURE: 84 MMHG | SYSTOLIC BLOOD PRESSURE: 148 MMHG | WEIGHT: 176 LBS

## 2020-11-12 DIAGNOSIS — R55 SYNCOPE, UNSPECIFIED SYNCOPE TYPE: ICD-10-CM

## 2020-11-12 DIAGNOSIS — I47.20 VENTRICULAR TACHYCARDIA (CMD): ICD-10-CM

## 2020-11-12 DIAGNOSIS — I65.23 ASYMPTOMATIC CAROTID ARTERY STENOSIS, BILATERAL: Primary | ICD-10-CM

## 2020-11-12 DIAGNOSIS — Z95.810 ICD (IMPLANTABLE CARDIOVERTER-DEFIBRILLATOR) IN PLACE: ICD-10-CM

## 2020-11-12 DIAGNOSIS — R93.1 ELEVATED CORONARY ARTERY CALCIUM SCORE: ICD-10-CM

## 2020-11-12 DIAGNOSIS — E78.00 PURE HYPERCHOLESTEROLEMIA: ICD-10-CM

## 2020-11-12 DIAGNOSIS — I42.2 HYPERTROPHIC CARDIOMYOPATHY (CMD): ICD-10-CM

## 2020-11-12 DIAGNOSIS — I10 ESSENTIAL HYPERTENSION: ICD-10-CM

## 2020-11-12 PROCEDURE — 99214 OFFICE O/P EST MOD 30 MIN: CPT | Performed by: INTERNAL MEDICINE

## 2020-11-12 SDOH — HEALTH STABILITY: PHYSICAL HEALTH: ON AVERAGE, HOW MANY MINUTES DO YOU ENGAGE IN EXERCISE AT THIS LEVEL?: 0 MIN

## 2020-11-12 SDOH — HEALTH STABILITY: PHYSICAL HEALTH: ON AVERAGE, HOW MANY DAYS PER WEEK DO YOU ENGAGE IN MODERATE TO STRENUOUS EXERCISE (LIKE A BRISK WALK)?: 0 DAYS

## 2020-11-12 ASSESSMENT — PATIENT HEALTH QUESTIONNAIRE - PHQ9
2. FEELING DOWN, DEPRESSED OR HOPELESS: NOT AT ALL
CLINICAL INTERPRETATION OF PHQ2 SCORE: NO FURTHER SCREENING NEEDED
CLINICAL INTERPRETATION OF PHQ9 SCORE: NO FURTHER SCREENING NEEDED
1. LITTLE INTEREST OR PLEASURE IN DOING THINGS: NOT AT ALL
SUM OF ALL RESPONSES TO PHQ9 QUESTIONS 1 AND 2: 0
SUM OF ALL RESPONSES TO PHQ9 QUESTIONS 1 AND 2: 0

## 2020-11-12 ASSESSMENT — ENCOUNTER SYMPTOMS
HEMATOCHEZIA: 0
BRUISES/BLEEDS EASILY: 0
WEIGHT GAIN: 0
HEMOPTYSIS: 0
COUGH: 0
SUSPICIOUS LESIONS: 0
CHILLS: 0
ALLERGIC/IMMUNOLOGIC COMMENTS: NO NEW FOOD ALLERGIES
FEVER: 0
WEIGHT LOSS: 0

## 2020-11-13 RX ORDER — EPLERENONE 25 MG/1
TABLET, FILM COATED ORAL
Qty: 45 TABLET | Refills: 3 | Status: SHIPPED | OUTPATIENT
Start: 2020-11-13 | End: 2021-04-14 | Stop reason: SDUPTHER

## 2020-11-17 ENCOUNTER — TELEPHONE (OUTPATIENT)
Dept: CARDIOLOGY | Age: 80
End: 2020-11-17

## 2020-11-19 ENCOUNTER — OFFICE VISIT (OUTPATIENT)
Dept: PAIN CLINIC | Facility: CLINIC | Age: 80
End: 2020-11-19
Payer: MEDICARE

## 2020-11-19 VITALS
SYSTOLIC BLOOD PRESSURE: 127 MMHG | DIASTOLIC BLOOD PRESSURE: 72 MMHG | RESPIRATION RATE: 16 BRPM | BODY MASS INDEX: 31.28 KG/M2 | WEIGHT: 170 LBS | OXYGEN SATURATION: 97 % | HEIGHT: 62 IN | HEART RATE: 60 BPM

## 2020-11-19 DIAGNOSIS — G56.03 BILATERAL CARPAL TUNNEL SYNDROME: Primary | ICD-10-CM

## 2020-11-19 PROCEDURE — 64450 NJX AA&/STRD OTHER PN/BRANCH: CPT | Performed by: ANESTHESIOLOGY

## 2020-11-19 PROCEDURE — 76942 ECHO GUIDE FOR BIOPSY: CPT | Performed by: ANESTHESIOLOGY

## 2020-11-19 RX ORDER — LIDOCAINE HYDROCHLORIDE 10 MG/ML
3 INJECTION, SOLUTION INFILTRATION; PERINEURAL ONCE
Status: COMPLETED | OUTPATIENT
Start: 2020-11-19 | End: 2020-11-19

## 2020-11-19 RX ORDER — METHYLPREDNISOLONE ACETATE 40 MG/ML
40 INJECTION, SUSPENSION INTRA-ARTICULAR; INTRALESIONAL; INTRAMUSCULAR; SOFT TISSUE ONCE
Status: COMPLETED | OUTPATIENT
Start: 2020-11-19 | End: 2020-11-19

## 2020-11-19 NOTE — PROGRESS NOTES
Patient presents in office today with reported pain in right arm numbness,sometimes numbness on the left arm too    Current pain level reported = 2/10

## 2020-11-19 NOTE — PROGRESS NOTES
Timeout completed prior to procedure @ 0567. Participants present for timeout:  Dr. Domo Cohen, and patient.

## 2020-11-20 ENCOUNTER — TELEPHONE (OUTPATIENT)
Dept: CARDIOLOGY | Age: 80
End: 2020-11-20

## 2020-11-22 NOTE — PROCEDURES
[facility]  Operative Report  2020     Sofiya Ruiz Patient Status:  No patient class for patient encounter    1940 MRN [id number]   Location [address] [place] [address], [address] [place] [address] Drive, [address] [place] [address] Attending No att. providers found   [address] tolerated the procedure very well.  The patient had complete understanding of the risks and benefits of the procedure.        Complications: None.     Follow up:  The patient will be followed in the pain clinic as needed basis.  Inder Basilio MD

## 2020-11-25 ENCOUNTER — HOSPITAL ENCOUNTER (OUTPATIENT)
Dept: ULTRASOUND IMAGING | Age: 80
Discharge: HOME | End: 2020-11-25
Attending: OTOLARYNGOLOGY
Payer: MEDICARE

## 2020-11-25 DIAGNOSIS — E04.1 NONTOXIC THYROID NODULE: ICD-10-CM

## 2020-11-25 PROCEDURE — 76536 US EXAM OF HEAD AND NECK: CPT | Performed by: OTOLARYNGOLOGY

## 2020-11-30 RX ORDER — ERGOCALCIFEROL 1.25 MG/1
CAPSULE ORAL
Qty: 12 CAPSULE | Refills: 1 | Status: SHIPPED | OUTPATIENT
Start: 2020-11-30 | End: 2021-05-10

## 2020-12-03 ENCOUNTER — OFFICE VISIT (OUTPATIENT)
Dept: PAIN CLINIC | Facility: CLINIC | Age: 80
End: 2020-12-03
Payer: MEDICARE

## 2020-12-03 VITALS
HEIGHT: 62 IN | RESPIRATION RATE: 16 BRPM | BODY MASS INDEX: 31.28 KG/M2 | OXYGEN SATURATION: 99 % | HEART RATE: 60 BPM | WEIGHT: 170 LBS | DIASTOLIC BLOOD PRESSURE: 78 MMHG | SYSTOLIC BLOOD PRESSURE: 120 MMHG

## 2020-12-03 DIAGNOSIS — G56.03 BILATERAL CARPAL TUNNEL SYNDROME: Primary | ICD-10-CM

## 2020-12-03 PROCEDURE — 64450 NJX AA&/STRD OTHER PN/BRANCH: CPT | Performed by: ANESTHESIOLOGY

## 2020-12-03 RX ORDER — LIDOCAINE HYDROCHLORIDE 10 MG/ML
3 INJECTION, SOLUTION INFILTRATION; PERINEURAL ONCE
Status: COMPLETED | OUTPATIENT
Start: 2020-12-03 | End: 2020-12-03

## 2020-12-03 RX ORDER — METHYLPREDNISOLONE ACETATE 40 MG/ML
40 INJECTION, SUSPENSION INTRA-ARTICULAR; INTRALESIONAL; INTRAMUSCULAR; SOFT TISSUE ONCE
Status: COMPLETED | OUTPATIENT
Start: 2020-12-03 | End: 2020-12-03

## 2020-12-03 NOTE — PROGRESS NOTES
Patient presents in office today with reported pain in left arm  Left median nerve block  Current pain level reported = 5/10

## 2020-12-03 NOTE — PROGRESS NOTES
Timeout completed prior to procedure @ 1769. Participants present for timeout:  Dr. Wilian Murray, and patient.

## 2020-12-04 ENCOUNTER — TELEPHONE (OUTPATIENT)
Dept: SURGERY | Facility: CLINIC | Age: 80
End: 2020-12-04

## 2020-12-04 NOTE — PROCEDURES
[facility]  Operative Report  2020     Sofiya Rollins Patient Status:  No patient class for patient encounter    1940 MRN [id number]   Location [address] [place] [address], [address] [place] [address], [address] [place] [address] Attending No att. providers found   Hos the procedure very well.  The patient had complete understanding of the risks and benefits of the procedure.        Complications: None.     Follow up:  The patient will be followed in the pain clinic as needed basis.  Armando Stein MD

## 2020-12-04 NOTE — TELEPHONE ENCOUNTER
Pt calling with questions on medications, states she reviewed the AVS printed on 11/19 post office injection; under \"Medications You Will Be Given\", mentions lidocaine & methoprednisone, next due is Thurs 11/19, 1 dose remaining; please call back

## 2020-12-07 NOTE — TELEPHONE ENCOUNTER
Spoke to pt to clarify info below. Pt read this info on her AVS and was confused. Clarified that this info was pertaining to her injection scheduled on 12/3. Unsure of why it shows \"1 dose remaining. \" Suggested this may indicate that it would be a one ti

## 2020-12-16 ENCOUNTER — TELEPHONE (OUTPATIENT)
Dept: CARDIOLOGY | Age: 80
End: 2020-12-16

## 2021-01-11 RX ORDER — OLMESARTAN MEDOXOMIL 5 MG/1
10 TABLET ORAL DAILY
Qty: 180 TABLET | Refills: 1 | Status: SHIPPED | OUTPATIENT
Start: 2021-01-11 | End: 2021-01-13 | Stop reason: SDUPTHER

## 2021-01-13 RX ORDER — OLMESARTAN MEDOXOMIL 5 MG/1
10 TABLET ORAL DAILY
Qty: 180 TABLET | Refills: 1 | Status: SHIPPED | OUTPATIENT
Start: 2021-01-13 | End: 2021-04-14 | Stop reason: SDUPTHER

## 2021-01-14 ENCOUNTER — HOSPITAL ENCOUNTER (OUTPATIENT)
Dept: CARDIOLOGY CLINIC | Facility: HOSPITAL | Age: 81
Discharge: HOME | End: 2021-01-14
Attending: INTERNAL MEDICINE
Payer: MEDICARE

## 2021-01-14 DIAGNOSIS — I65.23 ASYMPTOMATIC CAROTID ARTERY STENOSIS, BILATERAL: ICD-10-CM

## 2021-01-14 PROCEDURE — 93880 EXTRACRANIAL BILAT STUDY: CPT | Performed by: INTERNAL MEDICINE

## 2021-01-15 DIAGNOSIS — I65.23 ASYMPTOMATIC CAROTID ARTERY STENOSIS, BILATERAL: ICD-10-CM

## 2021-01-20 ENCOUNTER — TELEPHONE (OUTPATIENT)
Dept: CARDIOLOGY | Age: 81
End: 2021-01-20

## 2021-01-22 ENCOUNTER — LAB ENCOUNTER (OUTPATIENT)
Dept: LAB | Age: 81
End: 2021-01-22
Attending: INTERNAL MEDICINE
Payer: MEDICARE

## 2021-01-22 DIAGNOSIS — E03.9 HYPOTHYROIDISM, UNSPECIFIED TYPE: ICD-10-CM

## 2021-01-22 DIAGNOSIS — I10 ESSENTIAL HYPERTENSION: ICD-10-CM

## 2021-01-22 DIAGNOSIS — E78.00 HYPERCHOLESTEREMIA: ICD-10-CM

## 2021-01-22 DIAGNOSIS — R73.9 BLOOD GLUCOSE ELEVATED: ICD-10-CM

## 2021-01-22 DIAGNOSIS — R63.5 WEIGHT GAIN: ICD-10-CM

## 2021-01-22 LAB
ALBUMIN SERPL-MCNC: 3.6 G/DL (ref 3.4–5)
ALBUMIN/GLOB SERPL: 0.9 {RATIO} (ref 1–2)
ALP LIVER SERPL-CCNC: 79 U/L
ALT SERPL-CCNC: 23 U/L
ANION GAP SERPL CALC-SCNC: 4 MMOL/L (ref 0–18)
AST SERPL-CCNC: 20 U/L (ref 15–37)
BILIRUB SERPL-MCNC: 0.6 MG/DL (ref 0.1–2)
BUN BLD-MCNC: 19 MG/DL (ref 7–18)
BUN/CREAT SERPL: 20.7 (ref 10–20)
CALCIUM BLD-MCNC: 9.8 MG/DL (ref 8.5–10.1)
CHLORIDE SERPL-SCNC: 106 MMOL/L (ref 98–112)
CO2 SERPL-SCNC: 26 MMOL/L (ref 21–32)
CREAT BLD-MCNC: 0.92 MG/DL
EST. AVERAGE GLUCOSE BLD GHB EST-MCNC: 126 MG/DL (ref 68–126)
GLOBULIN PLAS-MCNC: 3.8 G/DL (ref 2.8–4.4)
GLUCOSE BLD-MCNC: 110 MG/DL (ref 70–99)
HBA1C MFR BLD HPLC: 6 % (ref ?–5.7)
M PROTEIN MFR SERPL ELPH: 7.4 G/DL (ref 6.4–8.2)
OSMOLALITY SERPL CALC.SUM OF ELEC: 285 MOSM/KG (ref 275–295)
PATIENT FASTING Y/N/NP: YES
POTASSIUM SERPL-SCNC: 4.3 MMOL/L (ref 3.5–5.1)
SODIUM SERPL-SCNC: 136 MMOL/L (ref 136–145)
T4 FREE SERPL-MCNC: 1.2 NG/DL (ref 0.8–1.7)
TSI SER-ACNC: 1.64 MIU/ML (ref 0.36–3.74)

## 2021-01-22 PROCEDURE — 80053 COMPREHEN METABOLIC PANEL: CPT

## 2021-01-22 PROCEDURE — 36415 COLL VENOUS BLD VENIPUNCTURE: CPT

## 2021-01-22 PROCEDURE — 83036 HEMOGLOBIN GLYCOSYLATED A1C: CPT

## 2021-01-22 PROCEDURE — 84439 ASSAY OF FREE THYROXINE: CPT

## 2021-01-22 PROCEDURE — 84443 ASSAY THYROID STIM HORMONE: CPT

## 2021-01-27 DIAGNOSIS — Z23 NEED FOR VACCINATION: ICD-10-CM

## 2021-01-29 ENCOUNTER — OFFICE VISIT (OUTPATIENT)
Dept: INTERNAL MEDICINE CLINIC | Facility: CLINIC | Age: 81
End: 2021-01-29
Payer: MEDICARE

## 2021-01-29 VITALS
WEIGHT: 169 LBS | HEIGHT: 62.01 IN | HEART RATE: 64 BPM | DIASTOLIC BLOOD PRESSURE: 70 MMHG | BODY MASS INDEX: 30.71 KG/M2 | RESPIRATION RATE: 16 BRPM | SYSTOLIC BLOOD PRESSURE: 104 MMHG | TEMPERATURE: 97 F

## 2021-01-29 DIAGNOSIS — E78.00 HYPERCHOLESTEREMIA: ICD-10-CM

## 2021-01-29 DIAGNOSIS — I42.1 HYPERTROPHIC OBSTRUCTIVE CARDIOMYOPATHY (HCC): ICD-10-CM

## 2021-01-29 DIAGNOSIS — E03.9 HYPOTHYROIDISM, UNSPECIFIED TYPE: ICD-10-CM

## 2021-01-29 DIAGNOSIS — Z12.31 ENCOUNTER FOR SCREENING MAMMOGRAM FOR MALIGNANT NEOPLASM OF BREAST: ICD-10-CM

## 2021-01-29 DIAGNOSIS — F32.A ANXIETY AND DEPRESSION: ICD-10-CM

## 2021-01-29 DIAGNOSIS — I10 ESSENTIAL HYPERTENSION: ICD-10-CM

## 2021-01-29 DIAGNOSIS — Z78.0 POST-MENOPAUSAL: ICD-10-CM

## 2021-01-29 DIAGNOSIS — F41.9 ANXIETY AND DEPRESSION: ICD-10-CM

## 2021-01-29 DIAGNOSIS — R92.1 BREAST CALCIFICATIONS: ICD-10-CM

## 2021-01-29 DIAGNOSIS — Z00.00 ENCOUNTER FOR ANNUAL HEALTH EXAMINATION: Primary | ICD-10-CM

## 2021-01-29 DIAGNOSIS — R73.09 ELEVATED GLUCOSE: ICD-10-CM

## 2021-01-29 PROCEDURE — G0439 PPPS, SUBSEQ VISIT: HCPCS | Performed by: INTERNAL MEDICINE

## 2021-01-29 PROCEDURE — 3008F BODY MASS INDEX DOCD: CPT | Performed by: INTERNAL MEDICINE

## 2021-01-29 PROCEDURE — 3074F SYST BP LT 130 MM HG: CPT | Performed by: INTERNAL MEDICINE

## 2021-01-29 PROCEDURE — 3078F DIAST BP <80 MM HG: CPT | Performed by: INTERNAL MEDICINE

## 2021-01-29 RX ORDER — ERGOCALCIFEROL 1.25 MG/1
50000 CAPSULE ORAL WEEKLY
COMMUNITY
Start: 2020-11-30

## 2021-01-29 NOTE — PATIENT INSTRUCTIONS
Sofiya Martell's SCREENING SCHEDULE   Tests on this list are recommended by your physician but may not be covered, or covered at this frequency, by your insurer. Please check with your insurance carrier before scheduling to verify coverage.    PREV criteria:   • Men who are 73-68 years old and have smoked more than 100 cigarettes in their lifetime   • Anyone with a family history    Colorectal Cancer Screening  Covered up to Age 76     Colonoscopy Screen   Covered every 10 years- more often if abnorm regularly   Immunizations      Influenza  Covered Annually Orders placed or performed in visit on 11/06/19   • FLU VACC HIGH DOSE PRSV FREE   Orders placed or performed in visit on 11/15/13   • INFLUENZA VIRUS VACCINE, PRESERV FREE, >=1YEARS OF AGE    Ple forms are also available in [place]  www. putitinwriting. org  This link also has information from the [facility] regarding Advance Directives.

## 2021-01-29 NOTE — PROGRESS NOTES
HPI:   Sofiya Mccrary is a 68year old female who presents for a Medicare Subsequent Annual Wellness visit (Pt already had Initial Annual Wellness). She is , takes care of her  with Alzheimers.  Lot of stress related to taking care Jasbir already takes aspirin and has it on her medication list.   CAGE Alcohol screening   Sofiya Chan was screened for Alcohol abuse and had a score of 0 so is at low risk.     Patient Care Team: Patient Care Team:  Regina Knight MD as PCP - Celsa Millan ace inhibitors; and latex.     CURRENT MEDICATIONS:     •  DULOXETINE HCL 60 MG Oral Cap DR Particles, TAKE 1 CAPSULE(60 MG) BY MOUTH EVERY DAY    •  LEVOTHYROXINE SODIUM 100 MCG Oral Tab, TAKE 1 TABLET BY MOUTH EVERY MORNING BEFORE BREAKFAST    •  ATORVAST Temp 97 °F (36.1 °C) (Temporal)   Resp 16   Ht 5' 2.01\" (1.575 m)   Wt 169 lb (76.7 kg)   LMP  (LMP Unknown)   Breastfeeding No   BMI 30.90 kg/m²  Estimated body mass index is 30.9 kg/m² as calculated from the following:    Height as of this encounter: 5' Inj 11/21/2019, 12/19/2019, 02/28/2020, 03/11/2020, 04/08/2020, 05/08/2020        ASSESSMENT AND OTHER RELEVANT CHRONIC CONDITIONS:   Sofiya Pagan is a 68year old female who presents for a Medicare Assessment.      PLAN SUMMARY:   Diagnoses and al provided for quick review of chart, separate sheet to patient  [address] Internal Lab or Procedure External Lab or Procedure   Diabetes Screening      HbgA1C   Annually Lab Results   Component Value Date    A1C 6.0 (H) Influenza  Covered Annually 10/15/2020 Please get every year    Pneumococcal 13 (Prevnar)  Covered Once after 65 11/20/2014 Please get once after your 65th birthday    Pneumococcal 23 (Pneumovax)  Covered Once after 65 06/26/2015 Please get once after your

## 2021-01-30 ENCOUNTER — IMMUNIZATION (OUTPATIENT)
Dept: LAB | Age: 81
End: 2021-01-30

## 2021-01-30 DIAGNOSIS — Z23 NEED FOR VACCINATION: Primary | ICD-10-CM

## 2021-01-30 PROCEDURE — 91301 COVID-19 MODERNA VACCINE: CPT | Performed by: HOSPITALIST

## 2021-01-30 PROCEDURE — 0011A COVID-19 MODERNA VACCINE: CPT | Performed by: HOSPITALIST

## 2021-02-04 ENCOUNTER — APPOINTMENT (OUTPATIENT)
Dept: CARDIOLOGY | Age: 81
End: 2021-02-04

## 2021-02-12 ENCOUNTER — OFFICE VISIT (OUTPATIENT)
Dept: CARDIOLOGY | Age: 81
End: 2021-02-12

## 2021-02-12 ENCOUNTER — ANCILLARY PROCEDURE (OUTPATIENT)
Dept: CARDIOLOGY | Age: 81
End: 2021-02-12
Attending: INTERNAL MEDICINE

## 2021-02-12 VITALS
WEIGHT: 171 LBS | HEART RATE: 62 BPM | BODY MASS INDEX: 30.29 KG/M2 | DIASTOLIC BLOOD PRESSURE: 64 MMHG | SYSTOLIC BLOOD PRESSURE: 128 MMHG

## 2021-02-12 DIAGNOSIS — Z45.02 ENCOUNTER FOR ASSESSMENT OF IMPLANTABLE CARDIOVERTER-DEFIBRILLATOR (ICD): ICD-10-CM

## 2021-02-12 DIAGNOSIS — Z95.810 ICD (IMPLANTABLE CARDIOVERTER-DEFIBRILLATOR) IN PLACE: Primary | ICD-10-CM

## 2021-02-12 PROCEDURE — 99215 OFFICE O/P EST HI 40 MIN: CPT | Performed by: INTERNAL MEDICINE

## 2021-02-12 PROCEDURE — 93283 PRGRMG EVAL IMPLANTABLE DFB: CPT | Performed by: INTERNAL MEDICINE

## 2021-02-12 PROCEDURE — 93290 INTERROG DEV EVAL ICPMS IP: CPT | Performed by: INTERNAL MEDICINE

## 2021-02-12 ASSESSMENT — ENCOUNTER SYMPTOMS
ALLERGIC/IMMUNOLOGIC COMMENTS: NO NEW FOOD ALLERGIES
SUSPICIOUS LESIONS: 0
HEMATOCHEZIA: 0
WEIGHT GAIN: 0
FEVER: 0
CHILLS: 0
BRUISES/BLEEDS EASILY: 0
HEMOPTYSIS: 0
COUGH: 0
WEIGHT LOSS: 0

## 2021-02-12 ASSESSMENT — PATIENT HEALTH QUESTIONNAIRE - PHQ9
1. LITTLE INTEREST OR PLEASURE IN DOING THINGS: NOT AT ALL
2. FEELING DOWN, DEPRESSED OR HOPELESS: NOT AT ALL
CLINICAL INTERPRETATION OF PHQ9 SCORE: NO FURTHER SCREENING NEEDED
CLINICAL INTERPRETATION OF PHQ2 SCORE: NO FURTHER SCREENING NEEDED
SUM OF ALL RESPONSES TO PHQ9 QUESTIONS 1 AND 2: 0
SUM OF ALL RESPONSES TO PHQ9 QUESTIONS 1 AND 2: 0

## 2021-02-16 PROCEDURE — 93283 PRGRMG EVAL IMPLANTABLE DFB: CPT | Performed by: INTERNAL MEDICINE

## 2021-02-16 PROCEDURE — 93290 INTERROG DEV EVAL ICPMS IP: CPT | Performed by: INTERNAL MEDICINE

## 2021-02-27 ENCOUNTER — IMMUNIZATION (OUTPATIENT)
Dept: LAB | Age: 81
End: 2021-02-27

## 2021-02-27 DIAGNOSIS — Z23 NEED FOR VACCINATION: Primary | ICD-10-CM

## 2021-02-27 PROCEDURE — 0012A COVID-19 MODERNA VACCINE: CPT | Performed by: NURSE PRACTITIONER

## 2021-02-27 PROCEDURE — 91301 COVID-19 MODERNA VACCINE: CPT | Performed by: NURSE PRACTITIONER

## 2021-03-05 RX ORDER — ATORVASTATIN CALCIUM 40 MG/1
40 TABLET, FILM COATED ORAL DAILY
Qty: 90 TABLET | Refills: 1 | Status: SHIPPED | OUTPATIENT
Start: 2021-03-05

## 2021-03-16 ENCOUNTER — TELEPHONE (OUTPATIENT)
Dept: CARDIOLOGY | Age: 81
End: 2021-03-16

## 2021-03-25 ENCOUNTER — APPOINTMENT (OUTPATIENT)
Dept: CARDIOLOGY | Age: 81
End: 2021-03-25

## 2021-04-02 ENCOUNTER — HOSPITAL ENCOUNTER (OUTPATIENT)
Dept: BONE DENSITY | Age: 81
Discharge: HOME | End: 2021-04-02
Attending: INTERNAL MEDICINE
Payer: MEDICARE

## 2021-04-02 ENCOUNTER — HOSPITAL ENCOUNTER (OUTPATIENT)
Dept: MAMMOGRAPHY | Age: 81
Discharge: HOME | End: 2021-04-02
Attending: INTERNAL MEDICINE
Payer: MEDICARE

## 2021-04-02 DIAGNOSIS — R92.1 BREAST CALCIFICATIONS: ICD-10-CM

## 2021-04-02 DIAGNOSIS — Z12.31 ENCOUNTER FOR SCREENING MAMMOGRAM FOR MALIGNANT NEOPLASM OF BREAST: ICD-10-CM

## 2021-04-02 DIAGNOSIS — Z78.0 POST-MENOPAUSAL: ICD-10-CM

## 2021-04-02 PROCEDURE — 77067 SCR MAMMO BI INCL CAD: CPT | Performed by: INTERNAL MEDICINE

## 2021-04-02 PROCEDURE — 77080 DXA BONE DENSITY AXIAL: CPT | Performed by: INTERNAL MEDICINE

## 2021-04-02 PROCEDURE — 77063 BREAST TOMOSYNTHESIS BI: CPT | Performed by: INTERNAL MEDICINE

## 2021-04-03 ENCOUNTER — E-ADVICE (OUTPATIENT)
Dept: CARDIOLOGY | Age: 81
End: 2021-04-03

## 2021-04-06 ENCOUNTER — TELEPHONE (OUTPATIENT)
Dept: INTERNAL MEDICINE CLINIC | Facility: CLINIC | Age: 81
End: 2021-04-06

## 2021-04-06 NOTE — TELEPHONE ENCOUNTER
Pt was not able to open her my chart results for her dexa scan. She would like a call back with the results.

## 2021-04-08 NOTE — TELEPHONE ENCOUNTER
Patient notified Dexa results, notified to get 1200mg of Calcium daily ideally through diet, continue Vitamin D supplement and weight bearing exercise. Pt verbalizes understanding.

## 2021-04-09 ENCOUNTER — APPOINTMENT (OUTPATIENT)
Dept: CARDIOLOGY | Age: 81
End: 2021-04-09

## 2021-04-12 ENCOUNTER — TELEPHONE (OUTPATIENT)
Dept: CARDIOLOGY | Age: 81
End: 2021-04-12

## 2021-04-14 ENCOUNTER — OFFICE VISIT (OUTPATIENT)
Dept: CARDIOLOGY | Age: 81
End: 2021-04-14

## 2021-04-14 ENCOUNTER — APPOINTMENT (OUTPATIENT)
Dept: CARDIOLOGY | Age: 81
End: 2021-04-14

## 2021-04-14 VITALS
BODY MASS INDEX: 30.83 KG/M2 | HEIGHT: 63 IN | RESPIRATION RATE: 18 BRPM | WEIGHT: 174 LBS | DIASTOLIC BLOOD PRESSURE: 60 MMHG | HEART RATE: 72 BPM | SYSTOLIC BLOOD PRESSURE: 128 MMHG

## 2021-04-14 DIAGNOSIS — E78.00 PURE HYPERCHOLESTEROLEMIA: ICD-10-CM

## 2021-04-14 DIAGNOSIS — E55.9 VITAMIN D DEFICIENCY: ICD-10-CM

## 2021-04-14 DIAGNOSIS — I50.32 CHRONIC DIASTOLIC CONGESTIVE HEART FAILURE (CMD): ICD-10-CM

## 2021-04-14 DIAGNOSIS — I42.2 HYPERTROPHIC CARDIOMYOPATHY (CMD): Primary | ICD-10-CM

## 2021-04-14 PROCEDURE — 99214 OFFICE O/P EST MOD 30 MIN: CPT | Performed by: INTERNAL MEDICINE

## 2021-04-14 RX ORDER — OLMESARTAN MEDOXOMIL 5 MG/1
10 TABLET ORAL DAILY
Qty: 180 TABLET | Refills: 3 | Status: SHIPPED | OUTPATIENT
Start: 2021-04-14 | End: 2022-04-21 | Stop reason: SDUPTHER

## 2021-04-14 RX ORDER — METOPROLOL SUCCINATE 200 MG/1
200 TABLET, EXTENDED RELEASE ORAL DAILY
Qty: 90 TABLET | Refills: 3 | Status: SHIPPED | OUTPATIENT
Start: 2021-04-14 | End: 2021-10-22 | Stop reason: DRUGHIGH

## 2021-04-14 RX ORDER — EPLERENONE 25 MG/1
12.5 TABLET, FILM COATED ORAL DAILY
Qty: 45 TABLET | Refills: 3 | Status: SHIPPED | OUTPATIENT
Start: 2021-04-14 | End: 2021-11-08

## 2021-04-14 RX ORDER — ATORVASTATIN CALCIUM 40 MG/1
40 TABLET, FILM COATED ORAL DAILY
Qty: 90 TABLET | Refills: 3 | Status: SHIPPED | OUTPATIENT
Start: 2021-04-14 | End: 2022-07-21 | Stop reason: SDUPTHER

## 2021-04-14 SDOH — HEALTH STABILITY: PHYSICAL HEALTH: ON AVERAGE, HOW MANY DAYS PER WEEK DO YOU ENGAGE IN MODERATE TO STRENUOUS EXERCISE (LIKE A BRISK WALK)?: 0 DAYS

## 2021-04-14 SDOH — HEALTH STABILITY: PHYSICAL HEALTH: ON AVERAGE, HOW MANY MINUTES DO YOU ENGAGE IN EXERCISE AT THIS LEVEL?: 0 MIN

## 2021-04-14 ASSESSMENT — ENCOUNTER SYMPTOMS
SUSPICIOUS LESIONS: 0
BRUISES/BLEEDS EASILY: 0
WEIGHT LOSS: 0
ALLERGIC/IMMUNOLOGIC COMMENTS: NO NEW FOOD ALLERGIES
COUGH: 0
DIZZINESS: 1
HEMOPTYSIS: 0
HEMATOCHEZIA: 0
WEIGHT GAIN: 0
FEVER: 0
CHILLS: 0
BACK PAIN: 1

## 2021-04-14 ASSESSMENT — NEW YORK HEART ASSOCIATION (NYHA) CLASSIFICATION: NYHA FUNCTIONAL CLASS: II

## 2021-04-14 ASSESSMENT — PATIENT HEALTH QUESTIONNAIRE - PHQ9
SUM OF ALL RESPONSES TO PHQ9 QUESTIONS 1 AND 2: 2
SUM OF ALL RESPONSES TO PHQ9 QUESTIONS 1 AND 2: 2
CLINICAL INTERPRETATION OF PHQ2 SCORE: NO FURTHER SCREENING NEEDED
2. FEELING DOWN, DEPRESSED OR HOPELESS: MORE THAN HALF THE DAYS
CLINICAL INTERPRETATION OF PHQ9 SCORE: NO FURTHER SCREENING NEEDED
1. LITTLE INTEREST OR PLEASURE IN DOING THINGS: NOT AT ALL

## 2021-04-28 ENCOUNTER — CLINICAL ABSTRACT (OUTPATIENT)
Dept: CARDIOLOGY | Age: 81
End: 2021-04-28

## 2021-05-07 ENCOUNTER — ANCILLARY ORDERS (OUTPATIENT)
Dept: CARDIOLOGY | Age: 81
End: 2021-05-07

## 2021-05-07 ENCOUNTER — ANCILLARY PROCEDURE (OUTPATIENT)
Dept: CARDIOLOGY | Age: 81
End: 2021-05-07
Attending: INTERNAL MEDICINE

## 2021-05-07 DIAGNOSIS — Z95.810 ICD (IMPLANTABLE CARDIOVERTER-DEFIBRILLATOR) IN PLACE: ICD-10-CM

## 2021-05-07 PROCEDURE — X1114 CARDIAC DEVICE HOME CHECK - REMOTE UNSCHEDULED: HCPCS | Performed by: INTERNAL MEDICINE

## 2021-05-10 RX ORDER — ERGOCALCIFEROL 1.25 MG/1
CAPSULE ORAL
Qty: 12 CAPSULE | Refills: 1 | Status: SHIPPED | OUTPATIENT
Start: 2021-05-10 | End: 2021-10-22 | Stop reason: CLARIF

## 2021-05-17 PROCEDURE — 93295 DEV INTERROG REMOTE 1/2/MLT: CPT | Performed by: INTERNAL MEDICINE

## 2021-05-27 ENCOUNTER — LAB ENCOUNTER (OUTPATIENT)
Dept: LAB | Age: 81
End: 2021-05-27
Attending: INTERNAL MEDICINE
Payer: MEDICARE

## 2021-05-27 DIAGNOSIS — E78.00 HYPERCHOLESTEREMIA: ICD-10-CM

## 2021-05-27 DIAGNOSIS — R73.09 ELEVATED GLUCOSE: ICD-10-CM

## 2021-05-27 PROCEDURE — 83036 HEMOGLOBIN GLYCOSYLATED A1C: CPT

## 2021-05-27 PROCEDURE — 80061 LIPID PANEL: CPT

## 2021-05-27 PROCEDURE — 36415 COLL VENOUS BLD VENIPUNCTURE: CPT

## 2021-05-27 PROCEDURE — 80053 COMPREHEN METABOLIC PANEL: CPT

## 2021-06-15 ENCOUNTER — OFFICE VISIT (OUTPATIENT)
Dept: INTERNAL MEDICINE CLINIC | Facility: CLINIC | Age: 81
End: 2021-06-15
Payer: MEDICARE

## 2021-06-15 VITALS
TEMPERATURE: 98 F | HEART RATE: 64 BPM | SYSTOLIC BLOOD PRESSURE: 106 MMHG | BODY MASS INDEX: 32.52 KG/M2 | WEIGHT: 179 LBS | HEIGHT: 62.01 IN | DIASTOLIC BLOOD PRESSURE: 64 MMHG

## 2021-06-15 DIAGNOSIS — E78.00 HYPERCHOLESTEREMIA: ICD-10-CM

## 2021-06-15 DIAGNOSIS — E11.9 NEW ONSET TYPE 2 DIABETES MELLITUS (HCC): Primary | ICD-10-CM

## 2021-06-15 DIAGNOSIS — I10 ESSENTIAL HYPERTENSION: ICD-10-CM

## 2021-06-15 PROCEDURE — 99214 OFFICE O/P EST MOD 30 MIN: CPT | Performed by: INTERNAL MEDICINE

## 2021-06-15 NOTE — PROGRESS NOTES
Sofiya Dunlap is a 68year old female. Patient presents with: Follow - Up: KIRILL rm 4 6 month f/u      HPI:     Patient here for f/u. She has new onset diabetes with hgba1c 6.5. no acute vision issues. Has glaucoma so goes to Dr. Delorise Babinski q4 months.  Jesus Knowles week.     • Aspirin Buf,CaCarb-MgCarb-MgO, 81 MG Oral Tab 1 tablet daily. • Metoprolol Succinate  MG Oral Tablet 24 Hr Take 1 tablet by mouth daily. • Olmesartan Medoxomil 5 MG Oral Tab Take 2 tablets by mouth daily.      • eplerenone 25 MG Or adenopathy      EXAM:   /64 (BP Location: Right arm, Patient Position: Sitting, Cuff Size: adult)   Pulse 64   Temp 97.7 °F (36.5 °C) (Temporal)   Ht 5' 2.01\" (1.575 m)   Wt 179 lb (81.2 kg)   LMP  (LMP Unknown)   BMI 32.73 kg/m²   GENERAL: well dev

## 2021-07-06 ENCOUNTER — TELEPHONE (OUTPATIENT)
Dept: PAIN CLINIC | Facility: CLINIC | Age: 81
End: 2021-07-06

## 2021-07-06 ENCOUNTER — OFFICE VISIT (OUTPATIENT)
Dept: PAIN CLINIC | Facility: CLINIC | Age: 81
End: 2021-07-06
Payer: MEDICARE

## 2021-07-06 VITALS
OXYGEN SATURATION: 98 % | RESPIRATION RATE: 16 BRPM | DIASTOLIC BLOOD PRESSURE: 62 MMHG | HEART RATE: 60 BPM | SYSTOLIC BLOOD PRESSURE: 104 MMHG

## 2021-07-06 DIAGNOSIS — G56.03 BILATERAL CARPAL TUNNEL SYNDROME: Primary | ICD-10-CM

## 2021-07-06 PROCEDURE — 99215 OFFICE O/P EST HI 40 MIN: CPT | Performed by: ANESTHESIOLOGY

## 2021-07-06 NOTE — TELEPHONE ENCOUNTER
Patient scheduled for procedure, pre-procedure instructions reviewed. Patient advised to hold ASA 81mg for 24 hours prior to procedure. Patient verbalized understanding of instructions, no further needs at this time.     Vanessa HEARD (Celecoxib)                         HERBAL SUPPLEMENTS  5 days  Fish oil, krill oil, Omega-3, vitamin E, Turmeric, Garlic                            Insurance Authorization:   Most insurances are now requiring a preauthorization for all procedures.   In the

## 2021-07-06 NOTE — PROGRESS NOTES
Last procedure: Bilateral carpal tunnel syndrome  Date: 12/03/20  Percentage of relief obtained: 100%  Duration of relief: 6 months     Current Pain Score: numbness not really pain

## 2021-07-06 NOTE — TELEPHONE ENCOUNTER
Question Answer Comment   Anesthesia Type Local    Provider Adam Logan    Location Office    Procedure Other (please add comment)    Medical clearance requested (will send to Pain Navigator) No    Patient has Medicare coverage?  Yes    Comments (Please list entir

## 2021-07-13 NOTE — PROGRESS NOTES
Name: Tereso Zuleta   : 1940   DOS: 2021     Pain Clinic Follow Up Visit:   Sofiya MARGRET Dane December is a 68year old female who presents for recheck of her chronic wrist pain and forearm pain.  She is status post bilateral median nerve blo breakfast. (Patient not taking: Reported on 7/6/2021 ) 30 tablet 3         EXAM:   /62 (BP Location: Right arm, Patient Position: Sitting)   Pulse 60   Resp 16   LMP  (LMP Unknown)   SpO2 98%   Moderate tenderness over the right wrist.  Tinel and Pha

## 2021-07-20 ENCOUNTER — OFFICE VISIT (OUTPATIENT)
Dept: PAIN CLINIC | Facility: CLINIC | Age: 81
End: 2021-07-20
Payer: MEDICARE

## 2021-07-20 VITALS
WEIGHT: 179 LBS | DIASTOLIC BLOOD PRESSURE: 64 MMHG | SYSTOLIC BLOOD PRESSURE: 108 MMHG | HEART RATE: 60 BPM | BODY MASS INDEX: 33 KG/M2 | OXYGEN SATURATION: 97 %

## 2021-07-20 DIAGNOSIS — G56.03 BILATERAL CARPAL TUNNEL SYNDROME: Primary | ICD-10-CM

## 2021-07-20 PROCEDURE — 76942 ECHO GUIDE FOR BIOPSY: CPT | Performed by: ANESTHESIOLOGY

## 2021-07-20 PROCEDURE — 64450 NJX AA&/STRD OTHER PN/BRANCH: CPT | Performed by: ANESTHESIOLOGY

## 2021-07-20 RX ORDER — METHYLPREDNISOLONE ACETATE 40 MG/ML
40 INJECTION, SUSPENSION INTRA-ARTICULAR; INTRALESIONAL; INTRAMUSCULAR; SOFT TISSUE ONCE
Status: COMPLETED | OUTPATIENT
Start: 2021-07-20 | End: 2021-07-20

## 2021-07-20 RX ORDER — LIDOCAINE HYDROCHLORIDE 10 MG/ML
5 INJECTION, SOLUTION INFILTRATION; PERINEURAL ONCE
Status: COMPLETED | OUTPATIENT
Start: 2021-07-20 | End: 2021-07-20

## 2021-07-20 NOTE — PROGRESS NOTES
Timeout completed prior to procedure @ 6046. Participants present for timeout:  Dr. Maryse Campos, and patient.

## 2021-07-28 NOTE — PROCEDURES
[facility]  Operative Report  2021     Sofiya Castrejon Patient Status:  No patient class for patient encounter    1940 MRN [id number]   Location [address] [place] [address], [address] [place] [address], [address] [place] [address] Attending No att. providers found   Hos tolerated the procedure very well.  The patient had complete understanding of the risks and benefits of the procedure.        Complications: None.     Follow up:  The patient will be followed in the pain clinic as needed basis.  Shavonne Ghotra MD

## 2021-08-13 ENCOUNTER — ANCILLARY PROCEDURE (OUTPATIENT)
Dept: CARDIOLOGY | Age: 81
End: 2021-08-13
Attending: INTERNAL MEDICINE

## 2021-08-13 DIAGNOSIS — Z95.810 ICD (IMPLANTABLE CARDIOVERTER-DEFIBRILLATOR) IN PLACE: ICD-10-CM

## 2021-09-08 ENCOUNTER — LABORATORY ENCOUNTER (OUTPATIENT)
Dept: LAB | Age: 81
End: 2021-09-08
Attending: INTERNAL MEDICINE
Payer: MEDICARE

## 2021-09-08 DIAGNOSIS — I10 ESSENTIAL HYPERTENSION: ICD-10-CM

## 2021-09-08 DIAGNOSIS — E11.9 NEW ONSET TYPE 2 DIABETES MELLITUS (HCC): ICD-10-CM

## 2021-09-08 LAB
ANION GAP SERPL CALC-SCNC: 5 MMOL/L (ref 0–18)
BUN BLD-MCNC: 23 MG/DL (ref 7–18)
CALCIUM BLD-MCNC: 9.9 MG/DL (ref 8.5–10.1)
CHLORIDE SERPL-SCNC: 106 MMOL/L (ref 98–112)
CO2 SERPL-SCNC: 24 MMOL/L (ref 21–32)
CREAT BLD-MCNC: 0.84 MG/DL
EST. AVERAGE GLUCOSE BLD GHB EST-MCNC: 128 MG/DL (ref 68–126)
GLUCOSE BLD-MCNC: 109 MG/DL (ref 70–99)
HBA1C MFR BLD HPLC: 6.1 % (ref ?–5.7)
OSMOLALITY SERPL CALC.SUM OF ELEC: 284 MOSM/KG (ref 275–295)
PATIENT FASTING Y/N/NP: YES
POTASSIUM SERPL-SCNC: 4.7 MMOL/L (ref 3.5–5.1)
SODIUM SERPL-SCNC: 135 MMOL/L (ref 136–145)

## 2021-09-08 PROCEDURE — 83036 HEMOGLOBIN GLYCOSYLATED A1C: CPT

## 2021-09-08 PROCEDURE — 36415 COLL VENOUS BLD VENIPUNCTURE: CPT

## 2021-09-08 PROCEDURE — 80048 BASIC METABOLIC PNL TOTAL CA: CPT

## 2021-09-15 ENCOUNTER — NURSE ONLY (OUTPATIENT)
Dept: ENDOCRINOLOGY CLINIC | Facility: CLINIC | Age: 81
End: 2021-09-15
Payer: MEDICARE

## 2021-09-15 VITALS — BODY MASS INDEX: 31 KG/M2 | WEIGHT: 171 LBS

## 2021-09-15 DIAGNOSIS — E11.9 NEW ONSET TYPE 2 DIABETES MELLITUS (HCC): ICD-10-CM

## 2021-09-15 PROCEDURE — G0108 DIAB MANAGE TRN  PER INDIV: HCPCS | Performed by: DIETITIAN, REGISTERED

## 2021-09-17 ENCOUNTER — PATIENT MESSAGE (OUTPATIENT)
Dept: INTERNAL MEDICINE CLINIC | Facility: CLINIC | Age: 81
End: 2021-09-17

## 2021-09-17 DIAGNOSIS — E11.9 NEW ONSET TYPE 2 DIABETES MELLITUS (HCC): Primary | ICD-10-CM

## 2021-09-17 RX ORDER — BLOOD SUGAR DIAGNOSTIC
STRIP MISCELLANEOUS
Qty: 100 STRIP | Refills: 3 | Status: SHIPPED | OUTPATIENT
Start: 2021-09-17 | End: 2022-09-17

## 2021-09-17 RX ORDER — LANCETS 30 GAUGE
1 EACH MISCELLANEOUS DAILY
Qty: 100 EACH | Refills: 3 | Status: SHIPPED | OUTPATIENT
Start: 2021-09-17

## 2021-09-17 RX ORDER — LANCETS 33 GAUGE
1 EACH MISCELLANEOUS DAILY
Qty: 100 EACH | Refills: 1 | Status: SHIPPED | OUTPATIENT
Start: 2021-09-17 | End: 2022-09-17

## 2021-09-17 NOTE — TELEPHONE ENCOUNTER
Pt left message on nurse line, requesting test strips and lancets. Also requesting 800# that was discussed in visit. Pt reported sticking finger multiple times with same lancet to get enough blood. Requests call back.   #[phone]

## 2021-09-17 NOTE — TELEPHONE ENCOUNTER
Call with patient. Sent lancets and test strips. Gave 800 for onetouch, unsure which number Ankita mentioned.

## 2021-09-22 ENCOUNTER — TELEPHONE (OUTPATIENT)
Dept: INTERNAL MEDICINE CLINIC | Facility: CLINIC | Age: 81
End: 2021-09-22

## 2021-09-30 ENCOUNTER — TELEPHONE (OUTPATIENT)
Dept: INTERNAL MEDICINE CLINIC | Facility: CLINIC | Age: 81
End: 2021-09-30

## 2021-09-30 ENCOUNTER — OFFICE VISIT (OUTPATIENT)
Dept: INTERNAL MEDICINE CLINIC | Facility: CLINIC | Age: 81
End: 2021-09-30
Payer: MEDICARE

## 2021-09-30 VITALS
OXYGEN SATURATION: 99 % | DIASTOLIC BLOOD PRESSURE: 58 MMHG | SYSTOLIC BLOOD PRESSURE: 118 MMHG | TEMPERATURE: 97 F | HEART RATE: 63 BPM | WEIGHT: 172 LBS | RESPIRATION RATE: 16 BRPM | BODY MASS INDEX: 31 KG/M2

## 2021-09-30 DIAGNOSIS — I10 PRIMARY HYPERTENSION: ICD-10-CM

## 2021-09-30 DIAGNOSIS — Z23 FLU VACCINE NEED: ICD-10-CM

## 2021-09-30 DIAGNOSIS — E11.69 DIABETES MELLITUS TYPE 2 IN OBESE (HCC): Primary | ICD-10-CM

## 2021-09-30 DIAGNOSIS — E03.8 OTHER SPECIFIED HYPOTHYROIDISM: ICD-10-CM

## 2021-09-30 DIAGNOSIS — E66.9 DIABETES MELLITUS TYPE 2 IN OBESE (HCC): Primary | ICD-10-CM

## 2021-09-30 PROCEDURE — 99214 OFFICE O/P EST MOD 30 MIN: CPT | Performed by: INTERNAL MEDICINE

## 2021-09-30 PROCEDURE — 90662 IIV NO PRSV INCREASED AG IM: CPT | Performed by: INTERNAL MEDICINE

## 2021-09-30 PROCEDURE — G0008 ADMIN INFLUENZA VIRUS VAC: HCPCS | Performed by: INTERNAL MEDICINE

## 2021-09-30 NOTE — TELEPHONE ENCOUNTER
Pt had retinal exam at [facility] per [facility]  Abstracted

## 2021-09-30 NOTE — PROGRESS NOTES
Sofiya Farooq is a 80year old female. Patient presents with:   Follow - Up: SN Rm 4; Med review, condition review      HPI:     Patient with recent diagnosis of dm2, HTN, HL, CAD, hypothyroidism here for f/u-  dm2- following dm diet and taking met VERIO) In Vitro Strip Test once daily Dx Code:  E11.9 Non-insulin dependent diabetes 100 strip 3   • OneTouch Delica Lancets 96S Does not apply Misc 1 each by Does not apply route daily.  Test once daily Dx Code: E11.9 non-insulin- dependent diabetes 100 ea 9/11/19    Vaping Use      Vaping Use: Never used    Alcohol use: Not Currently    Drug use: No    Family History   Adopted: Yes   Problem Relation Age of Onset   • Heart Disorder Father    • Heart Disorder Son    • Other (subclavian stenosis) Son (around 1/30/2022) for wellness. There are no Patient Instructions on file for this visit. The patient indicates understanding of these issues and agrees to the plan.

## 2021-10-12 ENCOUNTER — TELEPHONE (OUTPATIENT)
Dept: ENDOCRINOLOGY CLINIC | Facility: CLINIC | Age: 81
End: 2021-10-12

## 2021-10-12 DIAGNOSIS — E11.9 NEW ONSET TYPE 2 DIABETES MELLITUS (HCC): ICD-10-CM

## 2021-10-12 NOTE — TELEPHONE ENCOUNTER
Pt. LM to register for diabetes education classes. Attempted to contact patient;[email or web address] to return call to schedule education classes. Also pt. Told that she has refills for her testing supplies for one year.

## 2021-10-15 ENCOUNTER — LAB SERVICES (OUTPATIENT)
Dept: LAB | Age: 81
End: 2021-10-15

## 2021-10-15 ENCOUNTER — ANCILLARY PROCEDURE (OUTPATIENT)
Dept: CARDIOLOGY | Age: 81
End: 2021-10-15
Attending: INTERNAL MEDICINE

## 2021-10-15 DIAGNOSIS — I42.2 HYPERTROPHIC CARDIOMYOPATHY (CMD): ICD-10-CM

## 2021-10-15 DIAGNOSIS — I50.32 CHRONIC DIASTOLIC CONGESTIVE HEART FAILURE (CMD): ICD-10-CM

## 2021-10-15 DIAGNOSIS — E78.00 PURE HYPERCHOLESTEROLEMIA: ICD-10-CM

## 2021-10-15 DIAGNOSIS — E55.9 VITAMIN D DEFICIENCY: ICD-10-CM

## 2021-10-15 LAB
25(OH)D3+25(OH)D2 SERPL-MCNC: 71.8 NG/ML (ref 30–100)
ALBUMIN SERPL-MCNC: 3.7 G/DL (ref 3.6–5.1)
ALBUMIN/GLOB SERPL: 1 {RATIO} (ref 1–2.4)
ALP SERPL-CCNC: 68 UNITS/L (ref 45–117)
ALT SERPL-CCNC: 21 UNITS/L
ANION GAP SERPL CALC-SCNC: 6 MMOL/L (ref 10–20)
AST SERPL-CCNC: 20 UNITS/L
BASOPHILS # BLD: 0.1 K/MCL (ref 0–0.3)
BASOPHILS NFR BLD: 1 %
BILIRUB SERPL-MCNC: 0.6 MG/DL (ref 0.2–1)
BUN SERPL-MCNC: 16 MG/DL (ref 6–20)
BUN/CREAT SERPL: 19 (ref 7–25)
CALCIUM SERPL-MCNC: 9.4 MG/DL (ref 8.4–10.2)
CHLORIDE SERPL-SCNC: 107 MMOL/L (ref 98–107)
CHOLEST SERPL-MCNC: 151 MG/DL
CHOLEST/HDLC SERPL: 2.6 {RATIO}
CO2 SERPL-SCNC: 28 MMOL/L (ref 21–32)
CREAT SERPL-MCNC: 0.84 MG/DL (ref 0.51–0.95)
DEPRECATED RDW RBC: 44.3 FL (ref 39–50)
EOSINOPHIL # BLD: 0.3 K/MCL (ref 0–0.5)
EOSINOPHIL NFR BLD: 4 %
ERYTHROCYTE [DISTWIDTH] IN BLOOD: 12.5 % (ref 11–15)
FASTING DURATION TIME PATIENT: ABNORMAL H
FASTING DURATION TIME PATIENT: NORMAL H
GFR SERPLBLD BASED ON 1.73 SQ M-ARVRAT: 66 ML/MIN
GLOBULIN SER-MCNC: 3.8 G/DL (ref 2–4)
GLUCOSE SERPL-MCNC: 105 MG/DL (ref 70–99)
HCT VFR BLD CALC: 40.7 % (ref 36–46.5)
HDLC SERPL-MCNC: 58 MG/DL
HGB BLD-MCNC: 13.8 G/DL (ref 12–15.5)
IMM GRANULOCYTES # BLD AUTO: 0 K/MCL (ref 0–0.2)
IMM GRANULOCYTES # BLD: 0 %
LDLC SERPL CALC-MCNC: 69 MG/DL
LYMPHOCYTES # BLD: 2.5 K/MCL (ref 1–4)
LYMPHOCYTES NFR BLD: 29 %
MCH RBC QN AUTO: 32.9 PG (ref 26–34)
MCHC RBC AUTO-ENTMCNC: 33.9 G/DL (ref 32–36.5)
MCV RBC AUTO: 97.1 FL (ref 78–100)
MONOCYTES # BLD: 0.5 K/MCL (ref 0.3–0.9)
MONOCYTES NFR BLD: 6 %
NEUTROPHILS # BLD: 5.1 K/MCL (ref 1.8–7.7)
NEUTROPHILS NFR BLD: 60 %
NONHDLC SERPL-MCNC: 93 MG/DL
NRBC BLD MANUAL-RTO: 0 /100 WBC
NT-PROBNP SERPL-MCNC: 633 PG/ML
PLATELET # BLD AUTO: 261 K/MCL (ref 140–450)
POTASSIUM SERPL-SCNC: 4.2 MMOL/L (ref 3.4–5.1)
PROT SERPL-MCNC: 7.5 G/DL (ref 6.4–8.2)
RBC # BLD: 4.19 MIL/MCL (ref 4–5.2)
SODIUM SERPL-SCNC: 137 MMOL/L (ref 135–145)
TRIGL SERPL-MCNC: 118 MG/DL
TSH SERPL-ACNC: 2.65 MCUNITS/ML (ref 0.35–5)
WBC # BLD: 8.5 K/MCL (ref 4.2–11)

## 2021-10-15 PROCEDURE — 84443 ASSAY THYROID STIM HORMONE: CPT | Performed by: CLINICAL MEDICAL LABORATORY

## 2021-10-15 PROCEDURE — 80061 LIPID PANEL: CPT | Performed by: CLINICAL MEDICAL LABORATORY

## 2021-10-15 PROCEDURE — 80053 COMPREHEN METABOLIC PANEL: CPT | Performed by: CLINICAL MEDICAL LABORATORY

## 2021-10-15 PROCEDURE — 36415 COLL VENOUS BLD VENIPUNCTURE: CPT | Performed by: CLINICAL MEDICAL LABORATORY

## 2021-10-15 PROCEDURE — 83880 ASSAY OF NATRIURETIC PEPTIDE: CPT | Performed by: CLINICAL MEDICAL LABORATORY

## 2021-10-15 PROCEDURE — 82306 VITAMIN D 25 HYDROXY: CPT | Performed by: CLINICAL MEDICAL LABORATORY

## 2021-10-15 PROCEDURE — 85025 COMPLETE CBC W/AUTO DIFF WBC: CPT | Performed by: CLINICAL MEDICAL LABORATORY

## 2021-10-15 PROCEDURE — 93306 TTE W/DOPPLER COMPLETE: CPT | Performed by: INTERNAL MEDICINE

## 2021-10-19 ENCOUNTER — TELEPHONE (OUTPATIENT)
Dept: INTERNAL MEDICINE CLINIC | Facility: CLINIC | Age: 81
End: 2021-10-19

## 2021-10-22 ENCOUNTER — OFFICE VISIT (OUTPATIENT)
Dept: CARDIOLOGY | Age: 81
End: 2021-10-22

## 2021-10-22 VITALS
WEIGHT: 170 LBS | DIASTOLIC BLOOD PRESSURE: 60 MMHG | RESPIRATION RATE: 18 BRPM | HEIGHT: 63 IN | SYSTOLIC BLOOD PRESSURE: 108 MMHG | HEART RATE: 64 BPM | BODY MASS INDEX: 30.12 KG/M2

## 2021-10-22 DIAGNOSIS — I42.2 HYPERTROPHIC CARDIOMYOPATHY (CMD): ICD-10-CM

## 2021-10-22 DIAGNOSIS — E78.5 DYSLIPIDEMIA: ICD-10-CM

## 2021-10-22 DIAGNOSIS — E03.8 OTHER SPECIFIED HYPOTHYROIDISM: ICD-10-CM

## 2021-10-22 DIAGNOSIS — E55.9 VITAMIN D DEFICIENCY: ICD-10-CM

## 2021-10-22 DIAGNOSIS — E13.9 DIABETES 1.5, MANAGED AS TYPE 2 (CMD): ICD-10-CM

## 2021-10-22 DIAGNOSIS — R06.02 SHORTNESS OF BREATH: ICD-10-CM

## 2021-10-22 DIAGNOSIS — I65.23 BILATERAL CAROTID ARTERY STENOSIS: ICD-10-CM

## 2021-10-22 DIAGNOSIS — E78.00 PURE HYPERCHOLESTEROLEMIA: Primary | ICD-10-CM

## 2021-10-22 PROCEDURE — 99214 OFFICE O/P EST MOD 30 MIN: CPT | Performed by: INTERNAL MEDICINE

## 2021-10-22 RX ORDER — METOPROLOL SUCCINATE 100 MG/1
100 TABLET, EXTENDED RELEASE ORAL DAILY
Qty: 30 TABLET | Refills: 11 | Status: SHIPPED | OUTPATIENT
Start: 2021-10-22 | End: 2022-01-13 | Stop reason: SDUPTHER

## 2021-10-22 RX ORDER — ERGOCALCIFEROL 1.25 MG/1
CAPSULE ORAL
Qty: 12 CAPSULE | Refills: 1 | OUTPATIENT
Start: 2021-10-22

## 2021-10-22 SDOH — HEALTH STABILITY: PHYSICAL HEALTH: ON AVERAGE, HOW MANY MINUTES DO YOU ENGAGE IN EXERCISE AT THIS LEVEL?: 0 MIN

## 2021-10-22 SDOH — HEALTH STABILITY: PHYSICAL HEALTH: ON AVERAGE, HOW MANY DAYS PER WEEK DO YOU ENGAGE IN MODERATE TO STRENUOUS EXERCISE (LIKE A BRISK WALK)?: 0 DAYS

## 2021-10-22 ASSESSMENT — PATIENT HEALTH QUESTIONNAIRE - PHQ9
SUM OF ALL RESPONSES TO PHQ9 QUESTIONS 1 AND 2: 3
CLINICAL INTERPRETATION OF PHQ9 SCORE: MINIMAL DEPRESSION
1. LITTLE INTEREST OR PLEASURE IN DOING THINGS: NOT AT ALL
3. TROUBLE FALLING OR STAYING ASLEEP OR SLEEPING TOO MUCH: NOT AT ALL
4. FEELING TIRED OR HAVING LITTLE ENERGY: NOT AT ALL
7. TROUBLE CONCENTRATING ON THINGS, SUCH AS READING THE NEWSPAPER OR WATCHING TELEVISION: NOT AT ALL
8. MOVING OR SPEAKING SO SLOWLY THAT OTHER PEOPLE COULD HAVE NOTICED. OR THE OPPOSITE, BEING SO FIGETY OR RESTLESS THAT YOU HAVE BEEN MOVING AROUND A LOT MORE THAN USUAL: NOT AT ALL
CLINICAL INTERPRETATION OF PHQ2 SCORE: FURTHER SCREENING NEEDED
SUM OF ALL RESPONSES TO PHQ9 QUESTIONS 1 AND 2: 3
6. FEELING BAD ABOUT YOURSELF - OR THAT YOU ARE A FAILURE OR HAVE LET YOURSELF OR YOUR FAMILY DOWN: SEVERAL DAYS
SUM OF ALL RESPONSES TO PHQ9 QUESTIONS 1 AND 2: 3
SUM OF ALL RESPONSES TO PHQ9 QUESTIONS 1 TO 9: 4
CLINICAL INTERPRETATION OF PHQ9 SCORE: FURTHER SCREENING NEEDED
5. POOR APPETITE, WEIGHT LOSS, OR OVEREATING: NOT AT ALL
2. FEELING DOWN, DEPRESSED OR HOPELESS: NEARLY EVERY DAY
SUM OF ALL RESPONSES TO PHQ QUESTIONS 1-9: 4
CLINICAL INTERPRETATION OF PHQ2 SCORE: MINIMAL DEPRESSION
9. THOUGHTS THAT YOU WOULD BE BETTER OFF DEAD, OR OF HURTING YOURSELF: NOT AT ALL

## 2021-10-22 ASSESSMENT — ENCOUNTER SYMPTOMS
FEVER: 0
HEMATOCHEZIA: 0
DIZZINESS: 1
BRUISES/BLEEDS EASILY: 0
HEMOPTYSIS: 0
CHILLS: 0
BACK PAIN: 1
WEIGHT GAIN: 0
ALLERGIC/IMMUNOLOGIC COMMENTS: NO NEW FOOD ALLERGIES
SUSPICIOUS LESIONS: 0
WEIGHT LOSS: 0
COUGH: 0

## 2021-10-27 ENCOUNTER — HOSPITAL ENCOUNTER (OUTPATIENT)
Age: 81
Discharge: HOME | End: 2021-10-27
Payer: MEDICARE

## 2021-10-27 ENCOUNTER — APPOINTMENT (OUTPATIENT)
Dept: GENERAL RADIOLOGY | Age: 81
End: 2021-10-27
Attending: NURSE PRACTITIONER
Payer: MEDICARE

## 2021-10-27 VITALS
TEMPERATURE: 97 F | HEART RATE: 70 BPM | BODY MASS INDEX: 31.1 KG/M2 | WEIGHT: 169 LBS | DIASTOLIC BLOOD PRESSURE: 76 MMHG | OXYGEN SATURATION: 98 % | RESPIRATION RATE: 18 BRPM | HEIGHT: 62 IN | SYSTOLIC BLOOD PRESSURE: 120 MMHG
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
## 2021-10-27 DIAGNOSIS — S20.212A CONTUSION OF RIB ON LEFT SIDE, INITIAL ENCOUNTER: Primary | ICD-10-CM

## 2021-10-27 PROCEDURE — 99213 OFFICE O/P EST LOW 20 MIN: CPT

## 2021-10-27 PROCEDURE — 99203 OFFICE O/P NEW LOW 30 MIN: CPT

## 2021-10-27 PROCEDURE — 71101 X-RAY EXAM UNILAT RIBS/CHEST: CPT | Performed by: NURSE PRACTITIONER

## 2021-10-27 NOTE — ED PROVIDER NOTES
Patient Seen in: [facility]      History   Patient presents with:  Fall  Pain    Stated Complaint: fell hit ribs    Subjective: This is a 80-year-old female with below stated medical history.   Presents to immediate care for left anterio Allergic/Immunologic: Negative for environmental allergies. Neurological: Negative for headaches. Hematological: Does not bruise/bleed easily. Positive for stated complaint: fell hit ribs  Other systems are as noted in HPI.   Constitutional and rebound. Hernia: No hernia is present. Musculoskeletal:      Cervical back: Neck supple. Right lower leg: No edema. Left lower leg: No edema. Skin:     General: Skin is warm and dry.       Capillary Refill: Capillary refill takes less you

## 2021-10-27 NOTE — ED INITIAL ASSESSMENT (HPI)
Pt was washing baseboards on Friday night, lost balance when trying to stand up and hit L ribs and head on vinyl tyrell    No loc/vom

## 2021-11-08 ENCOUNTER — TELEPHONE (OUTPATIENT)
Dept: CARDIOLOGY | Age: 81
End: 2021-11-08

## 2021-11-08 ENCOUNTER — PATIENT MESSAGE (OUTPATIENT)
Dept: INTERNAL MEDICINE CLINIC | Facility: CLINIC | Age: 81
End: 2021-11-08

## 2021-11-08 RX ORDER — ERGOCALCIFEROL 1.25 MG/1
1.25 CAPSULE ORAL
Qty: 12 CAPSULE | Refills: 3 | Status: SHIPPED | OUTPATIENT
Start: 2021-11-08 | End: 2022-10-03

## 2021-11-08 RX ORDER — DULOXETIN HYDROCHLORIDE 60 MG/1
60 CAPSULE, DELAYED RELEASE ORAL DAILY
Qty: 90 CAPSULE | Refills: 0 | Status: SHIPPED | OUTPATIENT
Start: 2021-11-08 | End: 2022-02-03

## 2021-11-08 RX ORDER — EPLERENONE 25 MG/1
TABLET, FILM COATED ORAL
Qty: 45 TABLET | Refills: 3 | Status: SHIPPED | OUTPATIENT
Start: 2021-11-08 | End: 2022-11-02 | Stop reason: SDUPTHER

## 2021-11-08 NOTE — TELEPHONE ENCOUNTER
From: Sofiya Adler  To: Maged Dasilva MD  Sent: 11/8/2021 12:17 AM CST  Subject: Duloxitine    I received a denial for refill of my medication referenced above. I am not supposed to miss any days taking this pill.  I will run out before the end of

## 2021-11-18 ENCOUNTER — OFFICE VISIT (OUTPATIENT)
Dept: CARDIOLOGY | Age: 81
End: 2021-11-18

## 2021-11-18 VITALS
HEIGHT: 63 IN | HEART RATE: 64 BPM | WEIGHT: 169 LBS | BODY MASS INDEX: 29.95 KG/M2 | DIASTOLIC BLOOD PRESSURE: 82 MMHG | SYSTOLIC BLOOD PRESSURE: 125 MMHG

## 2021-11-18 DIAGNOSIS — I42.2 HYPERTROPHIC CARDIOMYOPATHY (CMD): ICD-10-CM

## 2021-11-18 DIAGNOSIS — I10 ESSENTIAL HYPERTENSION: ICD-10-CM

## 2021-11-18 DIAGNOSIS — E78.00 PURE HYPERCHOLESTEROLEMIA: Primary | ICD-10-CM

## 2021-11-18 DIAGNOSIS — I10 HYPERTENSION, BENIGN: ICD-10-CM

## 2021-11-18 DIAGNOSIS — I65.23 BILATERAL CAROTID ARTERY STENOSIS: ICD-10-CM

## 2021-11-18 DIAGNOSIS — Z95.810 ICD (IMPLANTABLE CARDIOVERTER-DEFIBRILLATOR) IN PLACE: ICD-10-CM

## 2021-11-18 DIAGNOSIS — I65.23 ASYMPTOMATIC CAROTID ARTERY STENOSIS, BILATERAL: ICD-10-CM

## 2021-11-18 DIAGNOSIS — I47.20 VENTRICULAR TACHYCARDIA (CMD): ICD-10-CM

## 2021-11-18 PROCEDURE — 99214 OFFICE O/P EST MOD 30 MIN: CPT | Performed by: INTERNAL MEDICINE

## 2021-11-18 SDOH — HEALTH STABILITY: PHYSICAL HEALTH: ON AVERAGE, HOW MANY MINUTES DO YOU ENGAGE IN EXERCISE AT THIS LEVEL?: 0 MIN

## 2021-11-18 SDOH — HEALTH STABILITY: PHYSICAL HEALTH: ON AVERAGE, HOW MANY DAYS PER WEEK DO YOU ENGAGE IN MODERATE TO STRENUOUS EXERCISE (LIKE A BRISK WALK)?: 0 DAYS

## 2021-11-18 ASSESSMENT — PATIENT HEALTH QUESTIONNAIRE - PHQ9
CLINICAL INTERPRETATION OF PHQ2 SCORE: NO FURTHER SCREENING NEEDED
1. LITTLE INTEREST OR PLEASURE IN DOING THINGS: NOT AT ALL
SUM OF ALL RESPONSES TO PHQ9 QUESTIONS 1 AND 2: 0
SUM OF ALL RESPONSES TO PHQ9 QUESTIONS 1 AND 2: 0
2. FEELING DOWN, DEPRESSED OR HOPELESS: NOT AT ALL

## 2021-11-19 ENCOUNTER — ANCILLARY PROCEDURE (OUTPATIENT)
Dept: CARDIOLOGY | Age: 81
End: 2021-11-19
Attending: INTERNAL MEDICINE

## 2021-11-19 ENCOUNTER — TELEPHONE (OUTPATIENT)
Dept: CARDIOLOGY | Age: 81
End: 2021-11-19

## 2021-11-19 ENCOUNTER — ANCILLARY ORDERS (OUTPATIENT)
Dept: CARDIOLOGY | Age: 81
End: 2021-11-19

## 2021-11-19 DIAGNOSIS — Z95.0 PACEMAKER: ICD-10-CM

## 2021-11-19 PROCEDURE — X1114 CARDIAC DEVICE HOME CHECK - REMOTE UNSCHEDULED: HCPCS | Performed by: INTERNAL MEDICINE

## 2021-11-20 ENCOUNTER — TELEPHONE (OUTPATIENT)
Dept: INTERNAL MEDICINE CLINIC | Facility: CLINIC | Age: 81
End: 2021-11-20

## 2021-11-20 NOTE — TELEPHONE ENCOUNTER
Recieved f/u cardiology office visit from [address] [facility]. Put in T.B. bin for review.

## 2021-12-02 ENCOUNTER — TELEPHONE (OUTPATIENT)
Dept: INTERNAL MEDICINE CLINIC | Facility: CLINIC | Age: 81
End: 2021-12-02

## 2021-12-02 DIAGNOSIS — Z13.228 SCREENING FOR METABOLIC DISORDER: ICD-10-CM

## 2021-12-02 DIAGNOSIS — Z13.0 SCREENING FOR BLOOD DISEASE: ICD-10-CM

## 2021-12-02 DIAGNOSIS — Z13.1 ENCOUNTER FOR SCREENING EXAMINATION FOR IMPAIRED GLUCOSE REGULATION AND DIABETES MELLITUS: ICD-10-CM

## 2021-12-02 DIAGNOSIS — Z00.00 ROUTINE GENERAL MEDICAL EXAMINATION AT A HEALTH CARE FACILITY: Primary | ICD-10-CM

## 2021-12-02 DIAGNOSIS — Z13.29 SCREENING FOR THYROID DISORDER: ICD-10-CM

## 2021-12-02 DIAGNOSIS — Z13.220 SCREENING FOR LIPID DISORDERS: ICD-10-CM

## 2021-12-02 NOTE — TELEPHONE ENCOUNTER
Orders to [facility] Pt aware to get labs done no sooner than 2 weeks prior to the appt. Pt aware to fast.  No call back required.   Future Appointments   Date Time Provider Anabell Shukla   2/3/2022  5:00 PM Felipe Ambriz MD [facility]

## 2021-12-28 ENCOUNTER — TELEPHONE (OUTPATIENT)
Dept: CARDIOLOGY | Age: 81
End: 2021-12-28

## 2021-12-28 DIAGNOSIS — I50.32 CHRONIC HEART FAILURE WITH PRESERVED EJECTION FRACTION (HFPEF) (CMD): ICD-10-CM

## 2021-12-28 DIAGNOSIS — I42.2 HYPERTROPHIC CARDIOMYOPATHY (CMD): Primary | ICD-10-CM

## 2022-01-01 ENCOUNTER — EXTERNAL RECORD (OUTPATIENT)
Dept: OTHER | Age: 82
End: 2022-01-01

## 2022-01-07 ENCOUNTER — TELEPHONE (OUTPATIENT)
Dept: CARDIOLOGY | Age: 82
End: 2022-01-07

## 2022-01-13 DIAGNOSIS — I42.2 HYPERTROPHIC CARDIOMYOPATHY (CMD): ICD-10-CM

## 2022-01-13 RX ORDER — METOPROLOL SUCCINATE 100 MG/1
100 TABLET, EXTENDED RELEASE ORAL DAILY
OUTPATIENT
Start: 2022-01-13

## 2022-01-13 RX ORDER — METOPROLOL SUCCINATE 100 MG/1
100 TABLET, EXTENDED RELEASE ORAL DAILY
Qty: 30 TABLET | Refills: 11 | Status: SHIPPED | OUTPATIENT
Start: 2022-01-13 | End: 2023-04-18 | Stop reason: SDUPTHER

## 2022-01-24 ENCOUNTER — TELEPHONE (OUTPATIENT)
Dept: CARDIOLOGY | Age: 82
End: 2022-01-24

## 2022-01-24 DIAGNOSIS — Z11.52 ENCOUNTER FOR PREPROCEDURE SCREENING LABORATORY TESTING FOR COVID-19: Primary | ICD-10-CM

## 2022-01-24 DIAGNOSIS — Z01.812 ENCOUNTER FOR PREPROCEDURE SCREENING LABORATORY TESTING FOR COVID-19: Primary | ICD-10-CM

## 2022-01-25 ENCOUNTER — LAB SERVICES (OUTPATIENT)
Dept: LAB | Age: 82
End: 2022-01-25

## 2022-01-25 DIAGNOSIS — Z01.812 ENCOUNTER FOR PREPROCEDURE SCREENING LABORATORY TESTING FOR COVID-19: ICD-10-CM

## 2022-01-25 DIAGNOSIS — Z11.52 ENCOUNTER FOR PREPROCEDURE SCREENING LABORATORY TESTING FOR COVID-19: ICD-10-CM

## 2022-01-25 LAB
SARS-COV-2 RNA RESP QL NAA+PROBE: NOT DETECTED
SERVICE CMNT-IMP: NORMAL
SERVICE CMNT-IMP: NORMAL

## 2022-01-25 PROCEDURE — U0003 INFECTIOUS AGENT DETECTION BY NUCLEIC ACID (DNA OR RNA); SEVERE ACUTE RESPIRATORY SYNDROME CORONAVIRUS 2 (SARS-COV-2) (CORONAVIRUS DISEASE [COVID-19]), AMPLIFIED PROBE TECHNIQUE, MAKING USE OF HIGH THROUGHPUT TECHNOLOGIES AS DESCRIBED BY CMS-2020-01-R: HCPCS | Performed by: CLINICAL MEDICAL LABORATORY

## 2022-01-25 PROCEDURE — U0005 INFEC AGEN DETEC AMPLI PROBE: HCPCS | Performed by: CLINICAL MEDICAL LABORATORY

## 2022-01-27 ENCOUNTER — ANCILLARY PROCEDURE (OUTPATIENT)
Dept: CARDIOLOGY | Age: 82
End: 2022-01-27
Attending: INTERNAL MEDICINE

## 2022-01-27 DIAGNOSIS — R06.02 SHORTNESS OF BREATH: ICD-10-CM

## 2022-01-27 DIAGNOSIS — I42.2 HYPERTROPHIC CARDIOMYOPATHY (CMD): ICD-10-CM

## 2022-01-27 DIAGNOSIS — I65.23 BILATERAL CAROTID ARTERY STENOSIS: ICD-10-CM

## 2022-01-27 PROCEDURE — 94621 CARDIOPULM EXERCISE TESTING: CPT | Performed by: INTERNAL MEDICINE

## 2022-01-27 PROCEDURE — 93880 EXTRACRANIAL BILAT STUDY: CPT | Performed by: INTERNAL MEDICINE

## 2022-01-27 ASSESSMENT — EXERCISE STRESS TEST
PEAK_O2_SAT: 96
PEAK_RPP: 6656
STAGE_CATEGORIES: RESTING
PEAK_BP: 104/60
PEAK_HR: 64

## 2022-01-28 ENCOUNTER — APPOINTMENT (OUTPATIENT)
Dept: CARDIOLOGY | Age: 82
End: 2022-01-28
Attending: INTERNAL MEDICINE

## 2022-01-28 ASSESSMENT — EXERCISE STRESS TEST
PEAK_RPP: 10680
PEAK_RPP: 7810
PEAK_RPP: 13300
STAGE_CATEGORIES: 3
COMMENTS: RPE:14
PEAK_HR: 89
PEAK_RPP: 9020
STAGE_CATEGORIES: 1
PEAK_HR: 82
PEAK_O2_SAT: 96
PEAK_HR: 86
PEAK_HR: 95
PEAK_O2_SAT: 98
COMMENTS: RPE:10
STAGE_CATEGORIES: RECOVERY 1
MPH: 1.7
MPH: 1.3
PEAK_BP: 110/70
PEAK_BP: 140/78
PEAK_O2_SAT: 99
STAGE_CATEGORIES: RECOVERY 0
GRADE: 4
COMMENTS: RPE:18
PEAK_HR: 95
PEAK_HR: 71
PEAK_RPP: 10320
STAGE_CATEGORIES: 2
MPH: 2.1
PEAK_BP: 110/64
PEAK_BP: 120/70
STAGE_CATEGORIES: RECOVERY 2
PEAK_BP: 120/70
GRADE: 8
GRADE: 6

## 2022-01-29 ENCOUNTER — LAB ENCOUNTER (OUTPATIENT)
Dept: LAB | Age: 82
End: 2022-01-29
Attending: INTERNAL MEDICINE
Payer: MEDICARE

## 2022-01-29 DIAGNOSIS — R79.89 LOW VITAMIN D LEVEL: ICD-10-CM

## 2022-01-29 DIAGNOSIS — Z13.0 SCREENING FOR BLOOD DISEASE: ICD-10-CM

## 2022-01-29 DIAGNOSIS — Z13.220 SCREENING FOR LIPID DISORDERS: ICD-10-CM

## 2022-01-29 DIAGNOSIS — Z00.00 ROUTINE GENERAL MEDICAL EXAMINATION AT A HEALTH CARE FACILITY: ICD-10-CM

## 2022-01-29 DIAGNOSIS — E53.8 LOW VITAMIN B12 LEVEL: ICD-10-CM

## 2022-01-29 DIAGNOSIS — E03.8 OTHER SPECIFIED HYPOTHYROIDISM: ICD-10-CM

## 2022-01-29 DIAGNOSIS — E55.9 VITAMIN D DEFICIENCY: ICD-10-CM

## 2022-01-29 DIAGNOSIS — E66.9 DIABETES MELLITUS TYPE 2 IN OBESE (HCC): ICD-10-CM

## 2022-01-29 DIAGNOSIS — E11.69 DIABETES MELLITUS TYPE 2 IN OBESE (HCC): ICD-10-CM

## 2022-01-29 LAB
ALBUMIN SERPL-MCNC: 3.6 G/DL (ref 3.4–5)
ALBUMIN/GLOB SERPL: 1.1 {RATIO} (ref 1–2)
ALP LIVER SERPL-CCNC: 65 U/L
ALT SERPL-CCNC: 16 U/L
ANION GAP SERPL CALC-SCNC: 5 MMOL/L (ref 0–18)
AST SERPL-CCNC: 19 U/L (ref 15–37)
BASOPHILS # BLD AUTO: 0.08 X10(3) UL (ref 0–0.2)
BASOPHILS NFR BLD AUTO: 0.9 %
BILIRUB SERPL-MCNC: 0.6 MG/DL (ref 0.1–2)
BUN BLD-MCNC: 20 MG/DL (ref 7–18)
CALCIUM BLD-MCNC: 9.4 MG/DL (ref 8.5–10.1)
CHLORIDE SERPL-SCNC: 107 MMOL/L (ref 98–112)
CHOLEST SERPL-MCNC: 138 MG/DL (ref ?–200)
CO2 SERPL-SCNC: 25 MMOL/L (ref 21–32)
CREAT BLD-MCNC: 0.86 MG/DL
CREAT UR-SCNC: 143 MG/DL
EOSINOPHIL # BLD AUTO: 0.35 X10(3) UL (ref 0–0.7)
EOSINOPHIL NFR BLD AUTO: 4 %
ERYTHROCYTE [DISTWIDTH] IN BLOOD BY AUTOMATED COUNT: 12.5 %
EST. AVERAGE GLUCOSE BLD GHB EST-MCNC: 123 MG/DL (ref 68–126)
FASTING PATIENT LIPID ANSWER: YES
FASTING STATUS PATIENT QL REPORTED: YES
GLOBULIN PLAS-MCNC: 3.4 G/DL (ref 2.8–4.4)
GLUCOSE BLD-MCNC: 103 MG/DL (ref 70–99)
HBA1C MFR BLD: 5.9 % (ref ?–5.7)
HCT VFR BLD AUTO: 41.4 %
HDLC SERPL-MCNC: 61 MG/DL (ref 40–59)
HGB BLD-MCNC: 14.2 G/DL
IMM GRANULOCYTES # BLD AUTO: 0.02 X10(3) UL (ref 0–1)
IMM GRANULOCYTES NFR BLD: 0.2 %
LDLC SERPL CALC-MCNC: 58 MG/DL (ref ?–100)
LYMPHOCYTES # BLD AUTO: 3.29 X10(3) UL (ref 1–4)
LYMPHOCYTES NFR BLD AUTO: 37.8 %
MCH RBC QN AUTO: 32.6 PG (ref 26–34)
MCHC RBC AUTO-ENTMCNC: 34.3 G/DL (ref 31–37)
MCV RBC AUTO: 95.2 FL
MICROALBUMIN UR-MCNC: 1.45 MG/DL
MICROALBUMIN/CREAT 24H UR-RTO: 10.1 UG/MG (ref ?–30)
MONOCYTES # BLD AUTO: 0.49 X10(3) UL (ref 0.1–1)
MONOCYTES NFR BLD AUTO: 5.6 %
NEUTROPHILS # BLD AUTO: 4.47 X10 (3) UL (ref 1.5–7.7)
NEUTROPHILS # BLD AUTO: 4.47 X10(3) UL (ref 1.5–7.7)
NEUTROPHILS NFR BLD AUTO: 51.5 %
NONHDLC SERPL-MCNC: 77 MG/DL (ref ?–130)
OSMOLALITY SERPL CALC.SUM OF ELEC: 287 MOSM/KG (ref 275–295)
PLATELET # BLD AUTO: 265 10(3)UL (ref 150–450)
POTASSIUM SERPL-SCNC: 4.6 MMOL/L (ref 3.5–5.1)
PROT SERPL-MCNC: 7 G/DL (ref 6.4–8.2)
RBC # BLD AUTO: 4.35 X10(6)UL
SODIUM SERPL-SCNC: 137 MMOL/L (ref 136–145)
TRIGL SERPL-MCNC: 104 MG/DL (ref 30–149)
TSI SER-ACNC: 1.98 MIU/ML (ref 0.36–3.74)
VIT B12 SERPL-MCNC: 291 PG/ML (ref 193–986)
VIT D+METAB SERPL-MCNC: 81.3 NG/ML (ref 30–100)
VLDLC SERPL CALC-MCNC: 15 MG/DL (ref 0–30)
WBC # BLD AUTO: 8.7 X10(3) UL (ref 4–11)

## 2022-01-29 PROCEDURE — 85025 COMPLETE CBC W/AUTO DIFF WBC: CPT

## 2022-01-29 PROCEDURE — 82570 ASSAY OF URINE CREATININE: CPT

## 2022-01-29 PROCEDURE — 82607 VITAMIN B-12: CPT

## 2022-01-29 PROCEDURE — 82043 UR ALBUMIN QUANTITATIVE: CPT

## 2022-01-29 PROCEDURE — 83036 HEMOGLOBIN GLYCOSYLATED A1C: CPT

## 2022-01-29 PROCEDURE — 80061 LIPID PANEL: CPT

## 2022-01-29 PROCEDURE — 36415 COLL VENOUS BLD VENIPUNCTURE: CPT

## 2022-01-29 PROCEDURE — 84443 ASSAY THYROID STIM HORMONE: CPT

## 2022-01-29 PROCEDURE — 82306 VITAMIN D 25 HYDROXY: CPT

## 2022-01-29 PROCEDURE — 80053 COMPREHEN METABOLIC PANEL: CPT

## 2022-02-02 ENCOUNTER — TELEPHONE (OUTPATIENT)
Dept: CARDIOLOGY | Age: 82
End: 2022-02-02

## 2022-02-03 ENCOUNTER — OFFICE VISIT (OUTPATIENT)
Dept: INTERNAL MEDICINE CLINIC | Facility: CLINIC | Age: 82
End: 2022-02-03
Payer: MEDICARE

## 2022-02-03 VITALS
DIASTOLIC BLOOD PRESSURE: 58 MMHG | TEMPERATURE: 97 F | RESPIRATION RATE: 16 BRPM | SYSTOLIC BLOOD PRESSURE: 114 MMHG | WEIGHT: 164.81 LBS | BODY MASS INDEX: 30.33 KG/M2 | OXYGEN SATURATION: 93 % | HEART RATE: 68 BPM | HEIGHT: 62 IN

## 2022-02-03 DIAGNOSIS — I47.2 VENTRICULAR TACHYCARDIA (HCC): ICD-10-CM

## 2022-02-03 DIAGNOSIS — Z00.00 ENCOUNTER FOR ANNUAL HEALTH EXAMINATION: Primary | ICD-10-CM

## 2022-02-03 DIAGNOSIS — F32.A ANXIETY AND DEPRESSION: ICD-10-CM

## 2022-02-03 DIAGNOSIS — E11.9 NEW ONSET TYPE 2 DIABETES MELLITUS (HCC): ICD-10-CM

## 2022-02-03 DIAGNOSIS — I10 PRIMARY HYPERTENSION: ICD-10-CM

## 2022-02-03 DIAGNOSIS — E78.00 HYPERCHOLESTEREMIA: ICD-10-CM

## 2022-02-03 DIAGNOSIS — I42.1 HYPERTROPHIC OBSTRUCTIVE CARDIOMYOPATHY (HCC): ICD-10-CM

## 2022-02-03 DIAGNOSIS — E53.8 VITAMIN B12 DEFICIENCY: ICD-10-CM

## 2022-02-03 DIAGNOSIS — E03.8 OTHER SPECIFIED HYPOTHYROIDISM: ICD-10-CM

## 2022-02-03 DIAGNOSIS — F41.9 ANXIETY AND DEPRESSION: ICD-10-CM

## 2022-02-03 PROBLEM — I47.20 VENTRICULAR TACHYCARDIA (HCC): Status: ACTIVE | Noted: 2022-02-03

## 2022-02-03 PROBLEM — I47.20 VENTRICULAR TACHYCARDIA: Status: ACTIVE | Noted: 2022-02-03

## 2022-02-03 PROCEDURE — G0439 PPPS, SUBSEQ VISIT: HCPCS | Performed by: INTERNAL MEDICINE

## 2022-02-03 RX ORDER — PYRIDOXINE HCL (VITAMIN B6) 100 MG
1 TABLET ORAL DAILY
COMMUNITY

## 2022-02-04 ENCOUNTER — OFF PREMISE (OUTPATIENT)
Dept: CARDIOLOGY | Age: 82
End: 2022-02-04

## 2022-02-04 DIAGNOSIS — I42.2 HYPERTROPHIC CARDIOMYOPATHY (CMD): Primary | ICD-10-CM

## 2022-02-04 LAB
BODY MASS INDEX: 30.5
BREATHING RESERVE (CALCULATED) PREDICTED: 35.16 %
BREATHING RESERVE (MEASURED) ACHIEVED: 48.6 %
CHRONOTROPIC INDEX PREDICTED: 0.52
HEART RATE RESERVE PREDICTED: 32.62 BPM
HEIGHT: 159 CM
IDEAL BODY WEIGHT: 60.5 KG
METS ACHIEVED: 3.6
OUES ACHIEVED: 1159
PEAK HR ACHIEVED: 95 BPM
PEAK HR PREDICTED: 141 BPM
PEAK O2 PULSE (%) PREDICTED: 122.91 %
PEAK O2 PULSE (ML/BEAT) ACHIEVED: 10.29 ML/BEAT
PEAK O2 PULSE (ML/BEAT) PREDICTED: 8.38 ML/BEAT
PEAK RESPIRATORY RATE ACHIEVED: 30 BRS/MIN
PEAK VE ACHIEVED: 41.5 L/MIN
PECO2 ACHIEVED: 18.9 MMHG
PECO2/PETCO2 AT PREDICTED: 72.69 %
PETCO2 ACHIEVED: 26 MMHG
PREDICTED VO2 % AT AT: 61.69 %
PREDICTED VO2 %: 83.12 %
RER MAX ACHIEVED: 1.12
RESTING HR ACHIEVED: 64 BPM
RESTING MVV: 64 L/MIN
STRESS BASELINE BP: NORMAL MMHG
STRESS O2 SAT REST: 96 %
STRESS PEAK HR: 95 BPM
STRESS PERCENT HR: 67 %
STRESS POST ESTIMATED WORKLOAD: 3.6 METS
STRESS POST EXERCISE DUR MIN: 6 MIN
STRESS POST EXERCISE DUR SEC: 0 SEC
STRESS POST PEAK BP: NORMAL MMHG
STRESS TARGET HR: 141 BPM
VD/VT ACHIEVED: 0.29
VE/VCO2 AT ACHIEVED: 46
VE/VO2 AT ACHIEVED: 35
VO2 AT ACHIEVED: 9.5 ML/KG/MIN
VO2 AT AT (ML/MIN) ACHIEVED: 728 ML/MIN
VO2 AT, IBW ACHIEVED: 12.03 ML/KG/MIN
VO2 PEAK (ML/KG/MIN) ACHIEVED: 12.8 ML/KG/MIN
VO2 PEAK (ML/KG/MIN) PREDICTED: 15.4 ML/KG/MIN
VO2 PEAK (ML/MIN) ACHIEVED: 978 ML/MIN
VO2 PEAK (ML/MIN) PREDICTED: 1181 ML/MIN
VO2 PEAK IBW ACHIEVED: 16.17 ML/KG/MIN
VT/IC PREDICTED: 57 %
WEIGHT MEASUREMENT: 77 KG

## 2022-02-09 ENCOUNTER — OFFICE VISIT (OUTPATIENT)
Dept: CARDIOLOGY | Age: 82
End: 2022-02-09

## 2022-02-09 ENCOUNTER — ANCILLARY PROCEDURE (OUTPATIENT)
Dept: CARDIOLOGY | Age: 82
End: 2022-02-09
Attending: INTERNAL MEDICINE

## 2022-02-09 VITALS — HEART RATE: 70 BPM | DIASTOLIC BLOOD PRESSURE: 76 MMHG | SYSTOLIC BLOOD PRESSURE: 120 MMHG

## 2022-02-09 VITALS
BODY MASS INDEX: 29.23 KG/M2 | WEIGHT: 165 LBS | DIASTOLIC BLOOD PRESSURE: 76 MMHG | SYSTOLIC BLOOD PRESSURE: 120 MMHG | HEART RATE: 70 BPM

## 2022-02-09 DIAGNOSIS — Z45.02 ENCOUNTER FOR ASSESSMENT OF IMPLANTABLE CARDIOVERTER-DEFIBRILLATOR (ICD): ICD-10-CM

## 2022-02-09 DIAGNOSIS — Z95.810 ICD (IMPLANTABLE CARDIOVERTER-DEFIBRILLATOR) IN PLACE: Primary | ICD-10-CM

## 2022-02-09 LAB
AMB EXT CHOLESTEROL, TOTAL: 151 MG/DL
AMB EXT CMP AST: 20 U/L
AMB EXT HDL CHOLESTEROL: 58 MG/DL
AMB EXT LDL CHOLESTEROL, DIRECT: 69 MG/DL
AMB EXT TRIGLYCERIDES: 118 MG/DL

## 2022-02-09 PROCEDURE — 93290 INTERROG DEV EVAL ICPMS IP: CPT | Performed by: INTERNAL MEDICINE

## 2022-02-09 PROCEDURE — 93283 PRGRMG EVAL IMPLANTABLE DFB: CPT | Performed by: INTERNAL MEDICINE

## 2022-02-09 PROCEDURE — 99215 OFFICE O/P EST HI 40 MIN: CPT | Performed by: INTERNAL MEDICINE

## 2022-02-16 ENCOUNTER — APPOINTMENT (OUTPATIENT)
Dept: CARDIOLOGY | Age: 82
End: 2022-02-16
Attending: INTERNAL MEDICINE

## 2022-02-16 ENCOUNTER — OFFICE VISIT (OUTPATIENT)
Dept: PAIN CLINIC | Facility: CLINIC | Age: 82
End: 2022-02-16
Payer: MEDICARE

## 2022-02-16 ENCOUNTER — TELEPHONE (OUTPATIENT)
Dept: PAIN CLINIC | Facility: CLINIC | Age: 82
End: 2022-02-16

## 2022-02-16 VITALS — DIASTOLIC BLOOD PRESSURE: 80 MMHG | HEART RATE: 63 BPM | SYSTOLIC BLOOD PRESSURE: 110 MMHG | OXYGEN SATURATION: 99 %

## 2022-02-16 DIAGNOSIS — G56.03 BILATERAL CARPAL TUNNEL SYNDROME: Primary | ICD-10-CM

## 2022-02-16 PROCEDURE — 3079F DIAST BP 80-89 MM HG: CPT | Performed by: PHYSICIAN ASSISTANT

## 2022-02-16 PROCEDURE — 99214 OFFICE O/P EST MOD 30 MIN: CPT | Performed by: PHYSICIAN ASSISTANT

## 2022-02-16 PROCEDURE — 3074F SYST BP LT 130 MM HG: CPT | Performed by: PHYSICIAN ASSISTANT

## 2022-02-16 RX ORDER — METOPROLOL SUCCINATE 100 MG/1
100 TABLET, EXTENDED RELEASE ORAL DAILY
COMMUNITY
Start: 2021-10-22

## 2022-02-16 NOTE — TELEPHONE ENCOUNTER
Patient has been scheduled:    Procedure: Left carpal tunnel block with US  Provider: Dr. Ada Lane  Date: 3-3-22  Time: 10:45 a.m.    NO Aspirin for 24 hours prior to the procedure date

## 2022-02-16 NOTE — TELEPHONE ENCOUNTER
Question Answer Comment   Anesthesia Type Local    Provider [facility] w/ Ultrasound    Procedure Other (please add comment) left carpal tunnel block   Medical clearance requested (will send to Pain Navigator) No    Patient has Medicare coverage?  Yes

## 2022-02-16 NOTE — PROGRESS NOTES
Patient presents in office today with reported numbness in left arm  Current pain level reported = 0/10    Last reported dose of na      Narcotic Contract renewal na    Urine Drug screen na

## 2022-02-18 ENCOUNTER — APPOINTMENT (OUTPATIENT)
Dept: CARDIOLOGY | Age: 82
End: 2022-02-18
Attending: INTERNAL MEDICINE

## 2022-02-18 ENCOUNTER — TELEPHONE (OUTPATIENT)
Dept: INTERNAL MEDICINE CLINIC | Facility: CLINIC | Age: 82
End: 2022-02-18

## 2022-02-18 NOTE — TELEPHONE ENCOUNTER
Received evaluation and lab results from [address] [place] [address],[address]. Abstracted. Put in TB bin for review.

## 2022-03-03 ENCOUNTER — OFFICE VISIT (OUTPATIENT)
Dept: PAIN CLINIC | Facility: CLINIC | Age: 82
End: 2022-03-03
Payer: MEDICARE

## 2022-03-03 VITALS — DIASTOLIC BLOOD PRESSURE: 80 MMHG | HEART RATE: 78 BPM | SYSTOLIC BLOOD PRESSURE: 110 MMHG | OXYGEN SATURATION: 98 %

## 2022-03-03 DIAGNOSIS — G56.03 BILATERAL CARPAL TUNNEL SYNDROME: ICD-10-CM

## 2022-03-03 DIAGNOSIS — M54.16 LUMBAR RADICULITIS: Primary | ICD-10-CM

## 2022-03-03 PROCEDURE — 76942 ECHO GUIDE FOR BIOPSY: CPT | Performed by: ANESTHESIOLOGY

## 2022-03-03 PROCEDURE — 64450 NJX AA&/STRD OTHER PN/BRANCH: CPT | Performed by: ANESTHESIOLOGY

## 2022-03-03 RX ORDER — LIDOCAINE HYDROCHLORIDE 10 MG/ML
1 INJECTION, SOLUTION INFILTRATION; PERINEURAL ONCE
Status: COMPLETED | OUTPATIENT
Start: 2022-03-03 | End: 2022-03-03

## 2022-03-03 RX ORDER — TRIAMCINOLONE ACETONIDE 40 MG/ML
40 INJECTION, SUSPENSION INTRA-ARTICULAR; INTRAMUSCULAR ONCE
Status: COMPLETED | OUTPATIENT
Start: 2022-03-03 | End: 2022-03-03

## 2022-03-03 NOTE — PROGRESS NOTES
Pt denies any recent illnesses, infections, open wounds or rashes on any part of body today. Pt requesting to schedule right median nerve block today.

## 2022-03-03 NOTE — PROGRESS NOTES
Timeout completed prior to procedure @ 11:06 AM.  Participants present for timeout:  Dr. Harper Gabriel, [address] [place], [place] [address], and patient.

## 2022-03-03 NOTE — PROGRESS NOTES
Patient presents in office today with reported pain in left wrist  Current pain level reported = 0/10    Last reported dose of na      Narcotic Contract renewal na    Urine Drug screen na

## 2022-03-21 ENCOUNTER — TELEPHONE (OUTPATIENT)
Dept: ENDOCRINOLOGY CLINIC | Facility: CLINIC | Age: 82
End: 2022-03-21

## 2022-03-21 NOTE — TELEPHONE ENCOUNTER
Pt called in stating when she pokes her finger, she is bleeding more than usual.  Pt does not take blood thinners, just one baby aspirin each day. Suggested setting the lancet to the lowest level. If excessive bleeding continues, should notify PCP. Pt would like to schedule step 2 class virtual please. Prefers call on home phone.   [phone]

## 2022-03-29 ENCOUNTER — OFFICE VISIT (OUTPATIENT)
Dept: PAIN CLINIC | Facility: CLINIC | Age: 82
End: 2022-03-29
Payer: MEDICARE

## 2022-03-29 VITALS — DIASTOLIC BLOOD PRESSURE: 60 MMHG | SYSTOLIC BLOOD PRESSURE: 106 MMHG | OXYGEN SATURATION: 92 % | HEART RATE: 67 BPM

## 2022-03-29 DIAGNOSIS — M54.16 LUMBAR RADICULITIS: ICD-10-CM

## 2022-03-29 DIAGNOSIS — G56.03 BILATERAL CARPAL TUNNEL SYNDROME: Primary | ICD-10-CM

## 2022-03-29 PROCEDURE — 64450 NJX AA&/STRD OTHER PN/BRANCH: CPT | Performed by: ANESTHESIOLOGY

## 2022-03-29 RX ORDER — TRIAMCINOLONE ACETONIDE 40 MG/ML
40 INJECTION, SUSPENSION INTRA-ARTICULAR; INTRAMUSCULAR ONCE
Status: COMPLETED | OUTPATIENT
Start: 2022-03-29 | End: 2022-03-29

## 2022-03-29 RX ORDER — LIDOCAINE HYDROCHLORIDE 10 MG/ML
4 INJECTION, SOLUTION INFILTRATION; PERINEURAL ONCE
Status: COMPLETED | OUTPATIENT
Start: 2022-03-29 | End: 2022-03-29

## 2022-03-29 NOTE — PROGRESS NOTES
Patient presents in office today with reported pain in numbness in right arm    Current pain level reported = 2/10    Last reported dose of n/a      Narcotic Contract renewal n/a    Urine Drug screen n/a

## 2022-04-14 ENCOUNTER — LAB ENCOUNTER (OUTPATIENT)
Dept: LAB | Age: 82
End: 2022-04-14
Attending: INTERNAL MEDICINE
Payer: MEDICARE

## 2022-04-14 DIAGNOSIS — R06.02 SHORTNESS OF BREATH: ICD-10-CM

## 2022-04-14 DIAGNOSIS — E55.9 VITAMIN D DEFICIENCY: ICD-10-CM

## 2022-04-14 DIAGNOSIS — I42.2 HYPERTROPHIC CARDIOMYOPATHY (HCC): Primary | ICD-10-CM

## 2022-04-14 DIAGNOSIS — E78.00 PURE HYPERCHOLESTEROLEMIA: ICD-10-CM

## 2022-04-14 DIAGNOSIS — E13.9 DIABETES 1.5, MANAGED AS TYPE 2 (HCC): ICD-10-CM

## 2022-04-14 DIAGNOSIS — E78.5 DYSLIPIDEMIA: ICD-10-CM

## 2022-04-14 DIAGNOSIS — E03.8 OTHER SPECIFIED HYPOTHYROIDISM: ICD-10-CM

## 2022-04-14 LAB
25(OH)D3+25(OH)D2 SERPL-MCNC: 70.8 NG/ML (ref 30–100)
ALBUMIN SERPL-MCNC: 3.5 G/DL (ref 3.4–5)
ALBUMIN SERPL-MCNC: 3.5 G/DL (ref 3.4–5)
ALBUMIN/GLOB SERPL: 1.1 {RATIO} (ref 1–2)
ALBUMIN/GLOB SERPL: 1.1 {RATIO} (ref 1–2)
ALP LIVER SERPL-CCNC: 68 U/L
ALP SERPL-CCNC: 68 U/L (ref 55–142)
ALT SERPL-CCNC: 24 U/L
ALT SERPL-CCNC: 24 U/L (ref 13–56)
ANION GAP SERPL CALC-SCNC: 7 MMOL/L (ref 0–18)
ANION GAP SERPL CALC-SCNC: 7 MMOL/L (ref 0–18)
AST SERPL-CCNC: 23 U/L (ref 15–37)
AST SERPL-CCNC: 23 U/L (ref 15–37)
BASOPHILS # BLD AUTO: 0.08 X10(3) UL (ref 0–0.2)
BASOPHILS NFR BLD AUTO: 0.8 %
BILIRUB SERPL-MCNC: 0.6 MG/D (ref 0.1–2)
BILIRUB SERPL-MCNC: 0.6 MG/DL (ref 0.1–2)
BUN BLD-MCNC: 20 MG/DL (ref 7–18)
BUN SERPL-MCNC: 20 MG/DL (ref 7–18)
CALCIUM BLD-MCNC: 9.4 MG/DL (ref 8.5–10.1)
CALCIUM SERPL-MCNC: 9.4 MG/DL (ref 8.5–10.1)
CALCULATED OSMO: 287 MOSM/KG (ref 275–295)
CHLORIDE SERPL-SCNC: 105 MMOL/L (ref 98–112)
CHLORIDE SERPL-SCNC: 105 MMOL/L (ref 98–112)
CHOLEST SERPL-MCNC: 146 MG/DL
CHOLEST SERPL-MCNC: 146 MG/DL (ref ?–200)
CO2 SERPL-SCNC: 25 MMOL/L (ref 21–32)
CO2 SERPL-SCNC: 25 MMOL/L (ref 21–32)
CREAT BLD-MCNC: 0.86 MG/DL
CREAT SERPL-MCNC: 0.86 MG/DL (ref 0.55–1.02)
EOSINOPHIL # BLD AUTO: 0.15 X10(3) UL (ref 0–0.7)
EOSINOPHIL NFR BLD AUTO: 1.5 %
ERYTHROCYTE [DISTWIDTH] IN BLOOD BY AUTOMATED COUNT: 12.8 %
FASTING PATIENT LIPID ANSWER: YES
FASTING STATUS PATIENT QL REPORTED: YES
GLOBULIN PLAS-MCNC: 3.1 G/DL (ref 2.8–4.4)
GLOBULIN SER-MCNC: 3.1 G/DL (ref 2.8–4.4)
GLUCOSE BLD-MCNC: 100 MG/DL (ref 70–99)
GLUCOSE SERPL-MCNC: 100 MG/DL (ref 70–99)
HCT VFR BLD AUTO: 42 %
HDLC SERPL-MCNC: 61 MG/DL (ref 40–59)
HDLC SERPL-MCNC: 61 MG/DL (ref 40–59)
HGB BLD-MCNC: 14 G/DL
IMM GRANULOCYTES # BLD AUTO: 0.05 X10(3) UL (ref 0–1)
IMM GRANULOCYTES NFR BLD: 0.5 %
LDLC SERPL CALC-MCNC: 70 MG/DL
LDLC SERPL CALC-MCNC: 70 MG/DL (ref ?–100)
LENGTH OF FAST TIME PATIENT: YES H
LENGTH OF FAST TIME PATIENT: YES H
LYMPHOCYTES # BLD AUTO: 2.67 X10(3) UL (ref 1–4)
LYMPHOCYTES NFR BLD AUTO: 26.8 %
MCH RBC QN AUTO: 32.3 PG (ref 26–34)
MCHC RBC AUTO-ENTMCNC: 33.3 G/DL (ref 31–37)
MCV RBC AUTO: 96.8 FL
MONOCYTES # BLD AUTO: 0.61 X10(3) UL (ref 0.1–1)
MONOCYTES NFR BLD AUTO: 6.1 %
NEUTROPHILS # BLD AUTO: 6.39 X10 (3) UL (ref 1.5–7.7)
NEUTROPHILS # BLD AUTO: 6.39 X10(3) UL (ref 1.5–7.7)
NEUTROPHILS NFR BLD AUTO: 64.3 %
NONHDLC SERPL-MCNC: 85 MG/DL
NONHDLC SERPL-MCNC: 85 MG/DL (ref ?–130)
NT-PROBNP SERPL-MCNC: 397 PG/ML
NT-PROBNP SERPL-MCNC: 397 PG/ML (ref ?–450)
OSMOLALITY SERPL CALC.SUM OF ELEC: 287 MOSM/KG (ref 275–295)
PLATELET # BLD AUTO: 269 10(3)UL (ref 150–450)
POTASSIUM SERPL-SCNC: 4.4 MMOL/L (ref 3.5–5.1)
POTASSIUM SERPL-SCNC: 4.4 MMOL/L (ref 3.5–5.1)
PROT SERPL-MCNC: 6.6 G/DL (ref 6.4–8.2)
PROT SERPL-MCNC: 6.6 G/DL (ref 6.4–8.2)
RBC # BLD AUTO: 4.34 X10(6)UL
SODIUM SERPL-SCNC: 137 MMOL/L (ref 136–145)
SODIUM SERPL-SCNC: 137 MMOL/L (ref 136–145)
TRIGL SERPL-MCNC: 80 MG/DL (ref 30–149)
TRIGL SERPL-MCNC: 80 MG/DL (ref 30–149)
TSH SERPL-ACNC: 1.07 MIU/ML (ref 0.36–3.74)
TSI SER-ACNC: 1.07 MIU/ML (ref 0.36–3.74)
VIT D+METAB SERPL-MCNC: 70.8 NG/ML (ref 30–100)
VLDLC SERPL CALC-MCNC: 12 MG/DL (ref 0–30)
VLDLC SERPL CALC-MCNC: 12 MG/DL (ref 0–30)
WBC # BLD AUTO: 10 X10(3) UL (ref 4–11)

## 2022-04-14 PROCEDURE — 83880 ASSAY OF NATRIURETIC PEPTIDE: CPT

## 2022-04-14 PROCEDURE — 85025 COMPLETE CBC W/AUTO DIFF WBC: CPT

## 2022-04-14 PROCEDURE — 80061 LIPID PANEL: CPT

## 2022-04-14 PROCEDURE — 84443 ASSAY THYROID STIM HORMONE: CPT

## 2022-04-14 PROCEDURE — 36415 COLL VENOUS BLD VENIPUNCTURE: CPT

## 2022-04-14 PROCEDURE — 80053 COMPREHEN METABOLIC PANEL: CPT

## 2022-04-14 PROCEDURE — 82306 VITAMIN D 25 HYDROXY: CPT

## 2022-04-18 ENCOUNTER — OFFICE VISIT (OUTPATIENT)
Dept: CARDIOLOGY | Age: 82
End: 2022-04-18

## 2022-04-18 VITALS
SYSTOLIC BLOOD PRESSURE: 90 MMHG | DIASTOLIC BLOOD PRESSURE: 60 MMHG | HEIGHT: 63 IN | BODY MASS INDEX: 28.74 KG/M2 | WEIGHT: 162.2 LBS | RESPIRATION RATE: 18 BRPM | HEART RATE: 66 BPM

## 2022-04-18 DIAGNOSIS — I50.32 CHRONIC HEART FAILURE WITH PRESERVED EJECTION FRACTION (HFPEF) (CMD): ICD-10-CM

## 2022-04-18 DIAGNOSIS — E78.00 PURE HYPERCHOLESTEROLEMIA: Primary | ICD-10-CM

## 2022-04-18 DIAGNOSIS — E55.9 VITAMIN D DEFICIENCY: ICD-10-CM

## 2022-04-18 PROCEDURE — 99215 OFFICE O/P EST HI 40 MIN: CPT | Performed by: INTERNAL MEDICINE

## 2022-04-18 RX ORDER — LANOLIN ALCOHOL/MO/W.PET/CERES
CREAM (GRAM) TOPICAL
COMMUNITY

## 2022-04-18 SDOH — HEALTH STABILITY: PHYSICAL HEALTH: ON AVERAGE, HOW MANY MINUTES DO YOU ENGAGE IN EXERCISE AT THIS LEVEL?: 0 MIN

## 2022-04-18 SDOH — HEALTH STABILITY: PHYSICAL HEALTH: ON AVERAGE, HOW MANY DAYS PER WEEK DO YOU ENGAGE IN MODERATE TO STRENUOUS EXERCISE (LIKE A BRISK WALK)?: 0 DAYS

## 2022-04-18 ASSESSMENT — ENCOUNTER SYMPTOMS
COUGH: 0
HEMATOCHEZIA: 0
SLEEP DISTURBANCES DUE TO BREATHING: 0
ABDOMINAL PAIN: 0
BACK PAIN: 1
HEADACHES: 0
BLOATING: 0
DIARRHEA: 0
WEIGHT GAIN: 0
INSOMNIA: 0
BRUISES/BLEEDS EASILY: 0
SHORTNESS OF BREATH: 0
LIGHT-HEADEDNESS: 1
ALLERGIC/IMMUNOLOGIC COMMENTS: NO NEW FOOD ALLERGIES
SUSPICIOUS LESIONS: 0
HEMOPTYSIS: 0
DEPRESSION: 0
CHILLS: 0
FEVER: 0
WEIGHT LOSS: 0
DIZZINESS: 1
CONSTIPATION: 0

## 2022-04-18 ASSESSMENT — PATIENT HEALTH QUESTIONNAIRE - PHQ9
2. FEELING DOWN, DEPRESSED OR HOPELESS: NOT AT ALL
1. LITTLE INTEREST OR PLEASURE IN DOING THINGS: NOT AT ALL
SUM OF ALL RESPONSES TO PHQ9 QUESTIONS 1 AND 2: 0
SUM OF ALL RESPONSES TO PHQ9 QUESTIONS 1 AND 2: 0
CLINICAL INTERPRETATION OF PHQ2 SCORE: NO FURTHER SCREENING NEEDED

## 2022-04-19 ENCOUNTER — MED REC SCAN ONLY (OUTPATIENT)
Dept: INTERNAL MEDICINE CLINIC | Facility: CLINIC | Age: 82
End: 2022-04-19

## 2022-04-21 RX ORDER — OLMESARTAN MEDOXOMIL 5 MG/1
10 TABLET ORAL DAILY
Qty: 180 TABLET | Refills: 3 | Status: SHIPPED | OUTPATIENT
Start: 2022-04-21 | End: 2023-04-13 | Stop reason: SDUPTHER

## 2022-04-28 ENCOUNTER — TELEPHONE (OUTPATIENT)
Dept: INTERNAL MEDICINE CLINIC | Facility: CLINIC | Age: 82
End: 2022-04-28

## 2022-04-28 NOTE — TELEPHONE ENCOUNTER
[facility]    Prescription Refill Request - Patient advised can take 48-72 hours.       Name of Medication (strength, dose, qty requested:      DULoxetine HCl TAKE 1 CAPSULE(60 MG) BY MOUTH EVERY DAY     Protocol Details     Levothyroxine Sodium 100 MCG TAKE 1 TABLET BY MOUTH EVERY MORNING BEFORE BREAKFAST    Pharm keeps sending these refills to wrong department    Future Appointments   Date Time Provider Anabell Shukla   5/16/2022  3:00 PM Lien Oliveros MD [facility]

## 2022-04-29 RX ORDER — LEVOTHYROXINE SODIUM 0.1 MG/1
TABLET ORAL
Qty: 90 TABLET | Refills: 0 | Status: SHIPPED | OUTPATIENT
Start: 2022-04-29

## 2022-04-29 NOTE — TELEPHONE ENCOUNTER
Abbie Laird   MRN: [id number]    Department:  [facility] Emergency Department   Date of Visit:  5/2/2018           Disclosure     Insurance plans vary and the physician(s) referred by the ER may not be covered by your plan.  Please contact yo Last VISIT - 2/3/22 Wellness visit    Last CPE - 2/3/22    Last REFILL -     DULOXETINE 60 MG Oral Cap DR Particles 90 capsule 0 11/8/2021      Last LABS - 4/14/22 cmp,lipid, cbc    Future Appointments   Date Time Provider Anabell Shukla   5/16/2022  3:00 PM Isaac Lai MD [facility]     Per PROTOCOL? FAILED    Please Approve or Deny. CARE PHYSICIAN AT ONCE OR RETURN IMMEDIATELY TO THE EMERGENCY DEPARTMENT. If you have been prescribed any medication(s), please fill your prescription right away and begin taking the medication(s) as directed.   If you believe that any of the medications

## 2022-04-30 RX ORDER — DULOXETIN HYDROCHLORIDE 60 MG/1
CAPSULE, DELAYED RELEASE ORAL
Qty: 90 CAPSULE | Refills: 0 | Status: SHIPPED | OUTPATIENT
Start: 2022-04-30

## 2022-05-03 ENCOUNTER — NURSE ONLY (OUTPATIENT)
Dept: ENDOCRINOLOGY CLINIC | Facility: CLINIC | Age: 82
End: 2022-05-03
Payer: MEDICARE

## 2022-05-03 DIAGNOSIS — E11.9 NEW ONSET TYPE 2 DIABETES MELLITUS (HCC): Primary | ICD-10-CM

## 2022-05-03 PROCEDURE — G0109 DIAB MANAGE TRN IND/GROUP: HCPCS | Performed by: DIETITIAN, REGISTERED

## 2022-05-04 NOTE — PROGRESS NOTES
Sofiya Polk  [id number] attended Step 2 Group Class: Pathophysiology of Diabetes, Types of Diabetes, Sources of Carbohydrate, Blood Glucose Targets, Medications    Date: 5/3/2022  Referring Provider: Dr. Brie Haney  Start time: 5:30 End time: 7:30    Sofiya Polk verbally consents to a telemedicine service with live, interactive video and audio on 5/3/2022. Patient understands and accepts financial responsibility for any deductible, co-insurance and/or co-pays associated with this service. The patient participated during the class: The patient was able to answer questions appropriately about treatments for type 2 diabetes. Healthy Eating   Identify sources of carbohydrate  Review technology options for assistance in determining carbohydrate and caloric content of foods   Being Active   Introduce the benefits and precautions of regular physical activity    Monitoring   Review blood glucose targets, Hemoglobin A1C   Introduce Continuous Glucose Monitoring Systems    Taking Medication   Discuss types of diabetes medications: methods of action, positive and negative aspects of each one, side effects including insulin     Problem Solving   Discuss risk factors for type 2 diabetes  Identify habits, myths and behavior changes affecting eating patterns and choices  Discuss how tracking calories and carbohydrate intake affects blood glucose and weigh reduction    Reducing Risks    Discuss progress with goals initiated in Step 1 individual session  Discuss signs/symptoms and treatment of hypoglycemia    Healthy Coping  During introductions, discuss how having diabetes and getting the diagnosis affects individuals with diabetes    The patient verbalized understanding and has no further questions at this time. Written materials provided for all areas covered.   Recommendation: attend Step 3 Class  Sylwia HOLGUIN

## 2022-05-14 ENCOUNTER — LAB ENCOUNTER (OUTPATIENT)
Dept: LAB | Age: 82
End: 2022-05-14
Attending: INTERNAL MEDICINE
Payer: MEDICARE

## 2022-05-14 DIAGNOSIS — E53.8 VITAMIN B12 DEFICIENCY: ICD-10-CM

## 2022-05-14 DIAGNOSIS — E11.9 NEW ONSET TYPE 2 DIABETES MELLITUS (HCC): ICD-10-CM

## 2022-05-14 DIAGNOSIS — I10 PRIMARY HYPERTENSION: ICD-10-CM

## 2022-05-14 LAB
ANION GAP SERPL CALC-SCNC: 7 MMOL/L (ref 0–18)
BUN BLD-MCNC: 19 MG/DL (ref 7–18)
CALCIUM BLD-MCNC: 9.4 MG/DL (ref 8.5–10.1)
CHLORIDE SERPL-SCNC: 110 MMOL/L (ref 98–112)
CO2 SERPL-SCNC: 21 MMOL/L (ref 21–32)
CREAT BLD-MCNC: 0.87 MG/DL
EST. AVERAGE GLUCOSE BLD GHB EST-MCNC: 120 MG/DL (ref 68–126)
FASTING STATUS PATIENT QL REPORTED: YES
GLUCOSE BLD-MCNC: 111 MG/DL (ref 70–99)
HBA1C MFR BLD: 5.8 % (ref ?–5.7)
OSMOLALITY SERPL CALC.SUM OF ELEC: 289 MOSM/KG (ref 275–295)
POTASSIUM SERPL-SCNC: 4 MMOL/L (ref 3.5–5.1)
SODIUM SERPL-SCNC: 138 MMOL/L (ref 136–145)
VIT B12 SERPL-MCNC: 698 PG/ML (ref 193–986)

## 2022-05-14 PROCEDURE — 82607 VITAMIN B-12: CPT

## 2022-05-14 PROCEDURE — 80048 BASIC METABOLIC PNL TOTAL CA: CPT

## 2022-05-14 PROCEDURE — 83036 HEMOGLOBIN GLYCOSYLATED A1C: CPT

## 2022-05-14 PROCEDURE — 36415 COLL VENOUS BLD VENIPUNCTURE: CPT

## 2022-05-16 ENCOUNTER — OFFICE VISIT (OUTPATIENT)
Dept: INTERNAL MEDICINE CLINIC | Facility: CLINIC | Age: 82
End: 2022-05-16
Payer: MEDICARE

## 2022-05-16 VITALS
WEIGHT: 163 LBS | HEART RATE: 68 BPM | SYSTOLIC BLOOD PRESSURE: 104 MMHG | DIASTOLIC BLOOD PRESSURE: 78 MMHG | BODY MASS INDEX: 30 KG/M2 | HEIGHT: 62 IN | TEMPERATURE: 97 F | RESPIRATION RATE: 18 BRPM | OXYGEN SATURATION: 98 %

## 2022-05-16 DIAGNOSIS — E78.00 HIGH CHOLESTEROL: ICD-10-CM

## 2022-05-16 DIAGNOSIS — I10 ESSENTIAL (PRIMARY) HYPERTENSION: ICD-10-CM

## 2022-05-16 DIAGNOSIS — F32.A ANXIETY AND DEPRESSION: ICD-10-CM

## 2022-05-16 DIAGNOSIS — E11.9 NEW ONSET TYPE 2 DIABETES MELLITUS (HCC): Primary | ICD-10-CM

## 2022-05-16 DIAGNOSIS — F41.9 ANXIETY AND DEPRESSION: ICD-10-CM

## 2022-05-16 PROCEDURE — 99214 OFFICE O/P EST MOD 30 MIN: CPT | Performed by: INTERNAL MEDICINE

## 2022-05-16 RX ORDER — MELATONIN
3 NIGHTLY
COMMUNITY

## 2022-05-17 ENCOUNTER — NURSE ONLY (OUTPATIENT)
Dept: ENDOCRINOLOGY CLINIC | Facility: CLINIC | Age: 82
End: 2022-05-17

## 2022-05-17 DIAGNOSIS — E11.9 NEW ONSET TYPE 2 DIABETES MELLITUS (HCC): ICD-10-CM

## 2022-05-17 PROCEDURE — G0109 DIAB MANAGE TRN IND/GROUP: HCPCS | Performed by: DIETITIAN, REGISTERED

## 2022-05-19 NOTE — PROGRESS NOTES
Sofiya Polk  [facility]8/9/1940 attended Step 3 Group Class: Carbohydrate Counting, Treating Lows  Due to COVID-19 ACTION PLAN, the patient's office visit was conducted via real-time interactive audio and video. The patient verbally consents to an audio and video consultation today which was conducted due to Covid-19/public health pandemic. The patient understands and accepts financial responsibility for any deductible, co-insurance and/or co-pays associated with this service. Please note that this visit was completed using two-way, real-time interactive audio and video communication. This has been done in good leah to provide diabetes education during the on-going public health crisis/national emergency and because of restrictions of face-to-face office visits. Date: 5/17/2022  Referring Provider: Dr. Bertram Blancas  Start time: 5:30pm End time: 7pm    The patient participated during the class: Pt was able to identify sources of carbohydrates. Healthy Eating  Introduce the balanced plate  Learn to count carbohydrates as well as to differentiate the food groups  Discuss label reading  Discuss sugar substitutes and effects on blood glucose  Discuss alcohol and effects on blood glucose  Review technology options for assistance in determining carbohydrate and caloric content of foods    Being Active  Discuss benefits of exercise and common myths    Monitoring  Review blood glucose targets    Taking Medication  Discuss treatment options for type 2 diabetes, introduce progressive nature of type 2 diabetes   Reducing Risks  Discuss signs/symptoms and treatment of hypoglycemia    The patient verbalized understanding and has no further questions at this time  Written materials provided for all areas covered. Recommendation: attend individual MNT session and Step 4 Class  Patient verbalized understanding and has no further questions at this time.     Luis Castillo, RN, CDE

## 2022-05-31 ENCOUNTER — NURSE ONLY (OUTPATIENT)
Dept: ENDOCRINOLOGY CLINIC | Facility: CLINIC | Age: 82
End: 2022-05-31
Payer: MEDICARE

## 2022-05-31 DIAGNOSIS — E11.9 NEW ONSET TYPE 2 DIABETES MELLITUS (HCC): Primary | ICD-10-CM

## 2022-05-31 PROCEDURE — G0109 DIAB MANAGE TRN IND/GROUP: HCPCS | Performed by: DIETITIAN, REGISTERED

## 2022-06-01 NOTE — PROGRESS NOTES
Sofiya Polk  [id number] attended Step 4 Group Class: Reducing Complications, Special Occasion Eating, Treating Hyperglycemia  Date: 5/31/2022  Referring Provider: Dr. Nas Corrales  Start time: 5:30 End time: 6:30 (pt's  fell at 6:30 and she signed off). Sofiya Polk verbally consents to a telemedicine service with live, interactive video and audio on 5/31/2022. Patient understands and accepts financial responsibility for any deductible, co-insurance and/or co-pays associated with this service. The patient participated during the class: Patient was able to identify ways to reduce risks for micro and macro-vascular complications of Type 2 Diabetes. Healthy Eating  Discuss tips for dining out, holidays and special occasion eating    Being Active  Reinforce the importance of regular physical activity    Monitoring  Discuss/answer questions about blood glucose trends    Taking Medication   Reinforce the importance of taking diabetes medications as prescribed    Problem Solving  Discuss disaster preparedness and planning to travel safely with type 2 diabetes    Reducing Risks  Discuss potential complications of uncontrolled diabetes and how to minimize risk including eye, foot, dental, and skin care. Renal and cardiovascular monitoring discussed including target goals and signs/symptoms of MI and CVA. Discuss immunizations recommended for type 2 diabetes  Discuss sick day management, DKA (diabetic ketoacidosis) and HHNS (hyperosmolar, hyperglycemic non-ketotic syndrome)   Healthy Coping  Discuss support plan, stress reduction and diabetes distress    The patient verbalized understanding and has no further questions at this time  Written materials provided for all areas covered.   Recommendation: attend Step 5 Class  Alli Harrison MS RD LATOSHAN ABIEL

## 2022-06-03 ENCOUNTER — ANCILLARY PROCEDURE (OUTPATIENT)
Dept: CARDIOLOGY | Age: 82
End: 2022-06-03
Attending: INTERNAL MEDICINE

## 2022-06-03 DIAGNOSIS — Z95.810 ICD (IMPLANTABLE CARDIOVERTER-DEFIBRILLATOR) IN PLACE: ICD-10-CM

## 2022-07-19 RX ORDER — DULOXETIN HYDROCHLORIDE 60 MG/1
CAPSULE, DELAYED RELEASE ORAL
Qty: 90 CAPSULE | Refills: 0 | Status: SHIPPED | OUTPATIENT
Start: 2022-07-19

## 2022-07-21 RX ORDER — ATORVASTATIN CALCIUM 40 MG/1
40 TABLET, FILM COATED ORAL DAILY
Qty: 90 TABLET | Refills: 3 | Status: SHIPPED | OUTPATIENT
Start: 2022-07-21 | End: 2023-07-14

## 2022-08-30 ENCOUNTER — TELEPHONE (OUTPATIENT)
Dept: NEUROLOGY | Facility: CLINIC | Age: 82
End: 2022-08-30

## 2022-08-30 ENCOUNTER — TELEPHONE (OUTPATIENT)
Dept: PAIN CLINIC | Facility: CLINIC | Age: 82
End: 2022-08-30

## 2022-08-30 DIAGNOSIS — G56.01 CARPAL TUNNEL SYNDROME OF RIGHT WRIST: Primary | ICD-10-CM

## 2022-08-30 NOTE — TELEPHONE ENCOUNTER
Attempted to call pt to verify which hand she needs the injection. Pt did not  the call. RN left a detailed message. requesting a callback

## 2022-08-30 NOTE — TELEPHONE ENCOUNTER
Received a call from pt. Pt states that it was her right hand that needs an injection. Advised pt will reach out to [address] [place] [address] for referral.Atlu BARRIENTOS

## 2022-08-30 NOTE — TELEPHONE ENCOUNTER
Pt called to set up injection for her hand. I attempted to schedule a f/u visit in office to discuss injection. Pt states that Dr. Stephen Lane doesn't require her to have a OV. Please advise.

## 2022-08-31 ENCOUNTER — TELEPHONE (OUTPATIENT)
Dept: INTERNAL MEDICINE CLINIC | Facility: CLINIC | Age: 82
End: 2022-08-31

## 2022-09-07 RX ORDER — DULOXETIN HYDROCHLORIDE 60 MG/1
CAPSULE, DELAYED RELEASE ORAL
Qty: 90 CAPSULE | Refills: 0 | OUTPATIENT
Start: 2022-09-07

## 2022-09-07 NOTE — TELEPHONE ENCOUNTER
Not due until 10/19/22    Last VISIT - 5/16/22 DM,HTN, cholesterol     Last CPE - 2/3/22    Last REFILL -     DULOXETINE 60 MG Oral Cap DR Particles 90 capsule 0 7/19/2022     Last LABS - 5/14/22 a1c, bmp 4/14/22 lipid, cmp, cbc    Future Appointments   Date Time Provider Anabell Shukla   11/7/2022  1:40 PM Alonzo Shelby MD [facility]     Per PROTOCOL? None    Please Approve or Deny.

## 2022-09-08 ENCOUNTER — TELEPHONE (OUTPATIENT)
Dept: INTERNAL MEDICINE CLINIC | Facility: CLINIC | Age: 82
End: 2022-09-08

## 2022-09-09 ENCOUNTER — ANCILLARY PROCEDURE (OUTPATIENT)
Dept: CARDIOLOGY | Age: 82
End: 2022-09-09
Attending: INTERNAL MEDICINE

## 2022-09-09 DIAGNOSIS — Z95.810 ICD (IMPLANTABLE CARDIOVERTER-DEFIBRILLATOR) IN PLACE: ICD-10-CM

## 2022-09-19 DIAGNOSIS — E11.9 NEW ONSET TYPE 2 DIABETES MELLITUS (HCC): ICD-10-CM

## 2022-09-20 ENCOUNTER — TELEPHONE (OUTPATIENT)
Dept: CARDIOLOGY | Age: 82
End: 2022-09-20

## 2022-09-20 RX ORDER — LANCETS 30 GAUGE
EACH MISCELLANEOUS
Qty: 100 EACH | Refills: 3 | Status: SHIPPED | OUTPATIENT
Start: 2022-09-20

## 2022-09-30 ENCOUNTER — TELEPHONE (OUTPATIENT)
Dept: ENDOCRINOLOGY CLINIC | Facility: CLINIC | Age: 82
End: 2022-09-30

## 2022-09-30 NOTE — TELEPHONE ENCOUNTER
Patient called regarding her BS - states readings were always low and the last couple of days the have been higher - the highest was 179. Instructed patient to test a few days after meals along with fasting BS to discuss with CDCES at next appointment. If BS continue to be elevated CDCES can make recommendations with PCP for additional meds or titration of current meds. Ask patient about changes in meds, diet or stress. She reports not change in current meds. Pt states she having a lot of  pain in her wrist due to carpel tunnel and is waiting to get in with doctor for injection. Patient also reports caring for  who has Alzheimer and is having a hard time managing everything. States Moses Hankins is falling apart over here\" and doesn't know what to do. Pt was crying and very upset on phone. I ask if she has any help such as home health nurse for . Someone is coming occasionally through [facility] but doesn't show up and she is unable to afford to put  in a facility to be cared for. Perhaps patient would benefit from being contacted resources through hospital who can assist with additional care for . Routing TE to educator for BS f/u at next appt and PCP regarding distress patient is under.

## 2022-10-03 RX ORDER — ERGOCALCIFEROL 1.25 MG/1
CAPSULE ORAL
Qty: 12 CAPSULE | Refills: 3 | Status: SHIPPED | OUTPATIENT
Start: 2022-10-03 | End: 2023-09-11 | Stop reason: SDUPTHER

## 2022-10-03 RX ORDER — DULOXETIN HYDROCHLORIDE 60 MG/1
CAPSULE, DELAYED RELEASE ORAL
Qty: 90 CAPSULE | Refills: 0 | Status: SHIPPED | OUTPATIENT
Start: 2022-10-03

## 2022-10-03 NOTE — TELEPHONE ENCOUNTER
Called pt's home. Pt stepped out. Pt's 's nurse answered and took message. Pt will call back regarding BG's. Pt is only on metformin 500 mg with breakfast. Last A1C 5.8% 5/14/22 and had been 5.9% 1/29/22.

## 2022-10-03 NOTE — TELEPHONE ENCOUNTER
Pt returned call. Said FBS today 115 - back to normal for her. Discussed how stress can elevated BG's. Says she is getting a cortisone injection tomorrow and she suspects it will increase her BG's.

## 2022-10-04 ENCOUNTER — OFFICE VISIT (OUTPATIENT)
Dept: PAIN CLINIC | Facility: CLINIC | Age: 82
End: 2022-10-04
Payer: MEDICARE

## 2022-10-04 VITALS
SYSTOLIC BLOOD PRESSURE: 110 MMHG | BODY MASS INDEX: 29 KG/M2 | HEART RATE: 64 BPM | WEIGHT: 159 LBS | DIASTOLIC BLOOD PRESSURE: 70 MMHG | OXYGEN SATURATION: 98 %

## 2022-10-04 DIAGNOSIS — G56.01 CARPAL TUNNEL SYNDROME OF RIGHT WRIST: Primary | ICD-10-CM

## 2022-10-04 PROCEDURE — 64450 NJX AA&/STRD OTHER PN/BRANCH: CPT | Performed by: ANESTHESIOLOGY

## 2022-10-04 RX ORDER — LIDOCAINE HYDROCHLORIDE 10 MG/ML
3 INJECTION, SOLUTION INFILTRATION; PERINEURAL ONCE
Status: COMPLETED | OUTPATIENT
Start: 2022-10-04 | End: 2022-10-04

## 2022-10-04 RX ORDER — TRIAMCINOLONE ACETONIDE 40 MG/ML
40 INJECTION, SUSPENSION INTRA-ARTICULAR; INTRAMUSCULAR ONCE
Status: COMPLETED | OUTPATIENT
Start: 2022-10-04 | End: 2022-10-04

## 2022-10-04 RX ORDER — LIDOCAINE HYDROCHLORIDE 10 MG/ML
2 INJECTION, SOLUTION INFILTRATION; PERINEURAL ONCE
Status: COMPLETED | OUTPATIENT
Start: 2022-10-04 | End: 2022-10-04

## 2022-10-04 NOTE — PROGRESS NOTES
Patient presents in office today with reported pain in Right arm pain ,numbness to right hand    Current pain level reported = 5/10    Last reported dose of tylenol 2 days ago        Narcotic Contract renewal n/a    Urine Drug screen n/a    Timeout completed prior to procedure @ 0120. Participants present for timeout:  Dr. Angel Herrera, and patient. Jorge De

## 2022-10-17 RX ORDER — LEVOTHYROXINE SODIUM 0.1 MG/1
TABLET ORAL
Qty: 90 TABLET | Refills: 0 | Status: SHIPPED | OUTPATIENT
Start: 2022-10-17

## 2022-10-17 RX ORDER — LEVOTHYROXINE SODIUM 0.1 MG/1
TABLET ORAL
Qty: 90 TABLET | Refills: 0 | OUTPATIENT
Start: 2022-10-17

## 2022-10-19 ENCOUNTER — ANCILLARY PROCEDURE (OUTPATIENT)
Dept: CARDIOLOGY | Age: 82
End: 2022-10-19
Attending: INTERNAL MEDICINE

## 2022-10-19 ENCOUNTER — LAB SERVICES (OUTPATIENT)
Dept: LAB | Age: 82
End: 2022-10-19

## 2022-10-19 DIAGNOSIS — E55.9 VITAMIN D DEFICIENCY: ICD-10-CM

## 2022-10-19 DIAGNOSIS — E78.00 PURE HYPERCHOLESTEROLEMIA: ICD-10-CM

## 2022-10-19 DIAGNOSIS — I50.32 CHRONIC HEART FAILURE WITH PRESERVED EJECTION FRACTION (HFPEF) (CMD): ICD-10-CM

## 2022-10-19 LAB
25(OH)D3+25(OH)D2 SERPL-MCNC: 83.6 NG/ML (ref 30–100)
ALBUMIN SERPL-MCNC: 3.5 G/DL (ref 3.6–5.1)
ALBUMIN/GLOB SERPL: 0.9 {RATIO} (ref 1–2.4)
ALP SERPL-CCNC: 129 UNITS/L (ref 45–117)
ALT SERPL-CCNC: 20 UNITS/L
AMB EXT HGBA1C: 5.9 %
ANION GAP SERPL CALC-SCNC: 10 MMOL/L (ref 7–19)
AORTIC VALVE AREA (AVA): 0.5
ASCENDING AORTA (AAD): 3
AST SERPL-CCNC: 19 UNITS/L
AV PEAK GRADIENT (AVPG): 7
AV PEAK VELOCITY (AVPV): 1.36
AV STENOSIS SEVERITY TEXT: NORMAL
AVI LVOT PEAK GRADIENT (LVOTMG): 1.4
BASOPHILS # BLD: 0.1 K/MCL (ref 0–0.3)
BASOPHILS NFR BLD: 1 %
BILIRUB SERPL-MCNC: 0.6 MG/DL (ref 0.2–1)
BUN SERPL-MCNC: 22 MG/DL (ref 6–20)
BUN/CREAT SERPL: 24 (ref 7–25)
CALCIUM SERPL-MCNC: 9.8 MG/DL (ref 8.4–10.2)
CHLORIDE SERPL-SCNC: 107 MMOL/L (ref 97–110)
CHOLEST SERPL-MCNC: 149 MG/DL
CHOLEST/HDLC SERPL: 2.2 {RATIO}
CO2 SERPL-SCNC: 26 MMOL/L (ref 21–32)
CREAT SERPL-MCNC: 0.92 MG/DL (ref 0.51–0.95)
DEPRECATED RDW RBC: 45.2 FL (ref 39–50)
E WAVE DECELARATION TIME (MDT): 10.35
EOSINOPHIL # BLD: 0.2 K/MCL (ref 0–0.5)
EOSINOPHIL NFR BLD: 2 %
ERYTHROCYTE [DISTWIDTH] IN BLOOD: 12.7 % (ref 11–15)
FASTING DURATION TIME PATIENT: 10 HOURS (ref 0–999)
FASTING DURATION TIME PATIENT: 10 HOURS (ref 0–999)
GFR SERPLBLD BASED ON 1.73 SQ M-ARVRAT: 63 ML/MIN
GLOBULIN SER-MCNC: 3.8 G/DL (ref 2–4)
GLUCOSE SERPL-MCNC: 102 MG/DL (ref 70–99)
HBA1C MFR BLD: 5.9 % (ref 4.5–5.6)
HCT VFR BLD CALC: 41.7 % (ref 36–46.5)
HDLC SERPL-MCNC: 68 MG/DL
HGB BLD-MCNC: 14 G/DL (ref 12–15.5)
IMM GRANULOCYTES # BLD AUTO: 0.1 K/MCL (ref 0–0.2)
IMM GRANULOCYTES # BLD: 1 %
LDLC SERPL CALC-MCNC: 64 MG/DL
LEFT INTERNAL DIMENSION IN SYSTOLE (LVSD): 1.7
LEFT VENTRICULAR INTERNAL DIMENSION IN DIASTOLE (LVDD): 2.6
LEFT VENTRICULAR POSTERIOR WALL IN END DIASTOLE (LVPW): 4
LV EF: NORMAL %
LVOT VTI (LVOTVTI): 0.78
LYMPHOCYTES # BLD: 3.1 K/MCL (ref 1–4)
LYMPHOCYTES NFR BLD: 32 %
MCH RBC QN AUTO: 32.7 PG (ref 26–34)
MCHC RBC AUTO-ENTMCNC: 33.6 G/DL (ref 32–36.5)
MCV RBC AUTO: 97.4 FL (ref 78–100)
MONOCYTES # BLD: 0.6 K/MCL (ref 0.3–0.9)
MONOCYTES NFR BLD: 6 %
MV E TISSUE VEL MED (MESV): 5.98
MV E WAVE VEL/E TISSUE VEL MED(MSR): 4.79
MV PEAK A VELOCITY (MVPAV): 333
MV PEAK E VELOCITY (MVPEV): 0.77
NEUTROPHILS # BLD: 5.8 K/MCL (ref 1.8–7.7)
NEUTROPHILS NFR BLD: 58 %
NONHDLC SERPL-MCNC: 81 MG/DL
NRBC BLD MANUAL-RTO: 0 /100 WBC
NT-PROBNP SERPL-MCNC: 739 PG/ML
PLATELET # BLD AUTO: 300 K/MCL (ref 140–450)
POTASSIUM SERPL-SCNC: 4.7 MMOL/L (ref 3.4–5.1)
PROT SERPL-MCNC: 7.3 G/DL (ref 6.4–8.2)
RBC # BLD: 4.28 MIL/MCL (ref 4–5.2)
RV END SYSTOLIC LONGITUDINAL STRAIN FREE WALL (RVGS): 2
SODIUM SERPL-SCNC: 138 MMOL/L (ref 135–145)
TRICUSPID VALVE ANNULAR PEAK VELOCITY (TVAPV): 26
TRICUSPID VALVE PEAK REGURGITATION VELOCITY (TRPV): 2.9
TRIGL SERPL-MCNC: 83 MG/DL
TSH SERPL-ACNC: 2.45 MCUNITS/ML (ref 0.35–5)
WBC # BLD: 9.9 K/MCL (ref 4.2–11)

## 2022-10-19 PROCEDURE — 80053 COMPREHEN METABOLIC PANEL: CPT | Performed by: CLINICAL MEDICAL LABORATORY

## 2022-10-19 PROCEDURE — 84443 ASSAY THYROID STIM HORMONE: CPT | Performed by: CLINICAL MEDICAL LABORATORY

## 2022-10-19 PROCEDURE — 85025 COMPLETE CBC W/AUTO DIFF WBC: CPT | Performed by: CLINICAL MEDICAL LABORATORY

## 2022-10-19 PROCEDURE — 76376 3D RENDER W/INTRP POSTPROCES: CPT | Performed by: INTERNAL MEDICINE

## 2022-10-19 PROCEDURE — 80061 LIPID PANEL: CPT | Performed by: CLINICAL MEDICAL LABORATORY

## 2022-10-19 PROCEDURE — 83036 HEMOGLOBIN GLYCOSYLATED A1C: CPT | Performed by: CLINICAL MEDICAL LABORATORY

## 2022-10-19 PROCEDURE — 83880 ASSAY OF NATRIURETIC PEPTIDE: CPT | Performed by: CLINICAL MEDICAL LABORATORY

## 2022-10-19 PROCEDURE — 36415 COLL VENOUS BLD VENIPUNCTURE: CPT | Performed by: CLINICAL MEDICAL LABORATORY

## 2022-10-19 PROCEDURE — 93306 TTE W/DOPPLER COMPLETE: CPT | Performed by: INTERNAL MEDICINE

## 2022-10-19 PROCEDURE — 82306 VITAMIN D 25 HYDROXY: CPT | Performed by: CLINICAL MEDICAL LABORATORY

## 2022-10-24 ENCOUNTER — TELEPHONE (OUTPATIENT)
Dept: INTERNAL MEDICINE CLINIC | Facility: CLINIC | Age: 82
End: 2022-10-24

## 2022-10-24 NOTE — TELEPHONE ENCOUNTER
Ok to discuss during visit. Nothing needed prior or would you like iso enzymes prior and repeat CMP?

## 2022-10-24 NOTE — TELEPHONE ENCOUNTER
Patient calling had recent labs done by Dr. Nguyen Yoon, Cardiologist and he advised to speak with PCP. Alkaline Phosphatase lab result has increased to 129. Patient asking if there is anything TB wants to order prior to her appt with TB on 11/7/22. Results are in care everywhere.

## 2022-10-28 ENCOUNTER — TELEPHONE (OUTPATIENT)
Dept: CARDIOLOGY | Age: 82
End: 2022-10-28

## 2022-11-01 PROBLEM — E13.9 DIABETES 1.5, MANAGED AS TYPE 2 (HCC): Status: ACTIVE | Noted: 2021-10-22

## 2022-11-01 PROBLEM — E78.5 DYSLIPIDEMIA: Status: ACTIVE | Noted: 2022-11-01

## 2022-11-01 PROBLEM — E55.9 VITAMIN D DEFICIENCY: Status: ACTIVE | Noted: 2022-11-01

## 2022-11-01 PROBLEM — R55 SYNCOPE: Status: ACTIVE | Noted: 2018-03-21

## 2022-11-01 PROBLEM — R06.02 SHORTNESS OF BREATH: Status: ACTIVE | Noted: 2021-10-22

## 2022-11-01 RX ORDER — BLOOD SUGAR DIAGNOSTIC
1 STRIP MISCELLANEOUS DAILY
COMMUNITY
Start: 2022-06-14

## 2022-11-02 ENCOUNTER — OFFICE VISIT (OUTPATIENT)
Dept: CARDIOLOGY | Age: 82
End: 2022-11-02

## 2022-11-02 VITALS
WEIGHT: 160.72 LBS | DIASTOLIC BLOOD PRESSURE: 58 MMHG | SYSTOLIC BLOOD PRESSURE: 103 MMHG | BODY MASS INDEX: 28.48 KG/M2 | HEIGHT: 63 IN | RESPIRATION RATE: 18 BRPM | HEART RATE: 75 BPM

## 2022-11-02 DIAGNOSIS — I50.32 CHRONIC HEART FAILURE WITH PRESERVED EJECTION FRACTION (HFPEF) (CMD): ICD-10-CM

## 2022-11-02 DIAGNOSIS — I42.2 HYPERTROPHIC CARDIOMYOPATHY (CMD): ICD-10-CM

## 2022-11-02 DIAGNOSIS — I65.23 ASYMPTOMATIC CAROTID ARTERY STENOSIS, BILATERAL: ICD-10-CM

## 2022-11-02 DIAGNOSIS — E55.9 VITAMIN D DEFICIENCY: ICD-10-CM

## 2022-11-02 DIAGNOSIS — E78.00 PURE HYPERCHOLESTEROLEMIA: Primary | ICD-10-CM

## 2022-11-02 PROCEDURE — 99214 OFFICE O/P EST MOD 30 MIN: CPT | Performed by: INTERNAL MEDICINE

## 2022-11-02 RX ORDER — EPLERENONE 25 MG/1
12.5 TABLET, FILM COATED ORAL DAILY
Qty: 45 TABLET | Refills: 3 | Status: SHIPPED | OUTPATIENT
Start: 2022-11-02 | End: 2023-10-13 | Stop reason: SDUPTHER

## 2022-11-02 SDOH — HEALTH STABILITY: PHYSICAL HEALTH: ON AVERAGE, HOW MANY MINUTES DO YOU ENGAGE IN EXERCISE AT THIS LEVEL?: 0 MIN

## 2022-11-02 SDOH — HEALTH STABILITY: PHYSICAL HEALTH: ON AVERAGE, HOW MANY DAYS PER WEEK DO YOU ENGAGE IN MODERATE TO STRENUOUS EXERCISE (LIKE A BRISK WALK)?: 0 DAYS

## 2022-11-02 ASSESSMENT — ENCOUNTER SYMPTOMS
LIGHT-HEADEDNESS: 1
SLEEP DISTURBANCES DUE TO BREATHING: 0
BLOATING: 0
HEMOPTYSIS: 0
HEADACHES: 0
SUSPICIOUS LESIONS: 0
DIARRHEA: 0
WEIGHT LOSS: 1
COUGH: 0
INSOMNIA: 0
DIZZINESS: 1
CONSTIPATION: 0
CHILLS: 0
HEMATOCHEZIA: 0
ABDOMINAL PAIN: 0
FEVER: 0
DEPRESSION: 0
ALLERGIC/IMMUNOLOGIC COMMENTS: NO NEW FOOD ALLERGIES
BRUISES/BLEEDS EASILY: 0
SHORTNESS OF BREATH: 0
WEIGHT GAIN: 0
BACK PAIN: 1

## 2022-11-02 ASSESSMENT — PATIENT HEALTH QUESTIONNAIRE - PHQ9
SUM OF ALL RESPONSES TO PHQ9 QUESTIONS 1 AND 2: 0
2. FEELING DOWN, DEPRESSED OR HOPELESS: NOT AT ALL
SUM OF ALL RESPONSES TO PHQ9 QUESTIONS 1 AND 2: 0
CLINICAL INTERPRETATION OF PHQ2 SCORE: NO FURTHER SCREENING NEEDED
1. LITTLE INTEREST OR PLEASURE IN DOING THINGS: NOT AT ALL

## 2022-11-07 ENCOUNTER — TELEPHONE (OUTPATIENT)
Dept: INTERNAL MEDICINE CLINIC | Facility: CLINIC | Age: 82
End: 2022-11-07

## 2022-11-07 ENCOUNTER — OFFICE VISIT (OUTPATIENT)
Dept: INTERNAL MEDICINE CLINIC | Facility: CLINIC | Age: 82
End: 2022-11-07
Payer: MEDICARE

## 2022-11-07 VITALS
SYSTOLIC BLOOD PRESSURE: 98 MMHG | BODY MASS INDEX: 29.93 KG/M2 | OXYGEN SATURATION: 99 % | RESPIRATION RATE: 18 BRPM | HEIGHT: 61.81 IN | TEMPERATURE: 97 F | DIASTOLIC BLOOD PRESSURE: 54 MMHG | HEART RATE: 73 BPM | WEIGHT: 162.63 LBS

## 2022-11-07 DIAGNOSIS — F43.21 GRIEF: ICD-10-CM

## 2022-11-07 DIAGNOSIS — E11.59 CONTROLLED TYPE 2 DIABETES MELLITUS WITH OTHER CIRCULATORY COMPLICATION, WITHOUT LONG-TERM CURRENT USE OF INSULIN (HCC): Primary | ICD-10-CM

## 2022-11-07 DIAGNOSIS — Z95.810 ICD (IMPLANTABLE CARDIOVERTER-DEFIBRILLATOR) IN PLACE: ICD-10-CM

## 2022-11-07 DIAGNOSIS — R74.8 ELEVATED ALKALINE PHOSPHATASE LEVEL: ICD-10-CM

## 2022-11-07 PROCEDURE — 1126F AMNT PAIN NOTED NONE PRSNT: CPT | Performed by: INTERNAL MEDICINE

## 2022-11-07 PROCEDURE — 99214 OFFICE O/P EST MOD 30 MIN: CPT | Performed by: INTERNAL MEDICINE

## 2022-11-13 ENCOUNTER — NURSE TRIAGE (OUTPATIENT)
Dept: TELEHEALTH | Age: 82
End: 2022-11-13

## 2022-11-13 ENCOUNTER — TELEPHONE (OUTPATIENT)
Dept: INTERNAL MEDICINE CLINIC | Facility: CLINIC | Age: 82
End: 2022-11-13

## 2022-11-13 ENCOUNTER — TELEPHONE (OUTPATIENT)
Dept: SCHEDULING | Age: 82
End: 2022-11-13

## 2022-11-13 NOTE — TELEPHONE ENCOUNTER
Received call on Sunday. Patient tested positive for covid today. Home test.  Symptom onset yesterday with headache. Today with nausea, cough, chest congestion. Intermittent dizziness. No SOB. No chest pain. Feels like she has a fever but not able to take her temperature. Vaccinated including 2 boosters. most recently April 2022. She has not received the lastest booster. Discussed paxlovid including use and side effects  Medication interactions reviewed. Unfortunately paxlovid is contraindicated with the use of eplerenone and she will not be able to hold this medication while taking Paxlovid. Discussed conservative treatment options and she will monitor symptoms. Discussed ER warnings and will need to call 911 if needed as she has nobody to take her to the ER. Discussed quarantine guidelines and she verbalized understanding. We will call patient tomorrow for update.

## 2022-11-14 ENCOUNTER — TELEPHONE (OUTPATIENT)
Dept: INTERNAL MEDICINE CLINIC | Facility: CLINIC | Age: 82
End: 2022-11-14

## 2022-11-14 NOTE — TELEPHONE ENCOUNTER
Patient states she was feeling better today until she fell in the bathroom going to get her medications. Pt landed on her left side and brushed her head against the bathroom door. Pt states she no longer has a h/a, nausea. Pt denies sob, cp. Discussed isolation, home remedies, ER warnings. Pt notified spoke to TB about Dr Weldon Read Cardiologist ok to stop atorvastatin and Eplerenone however TB indicates there are other medications that she would not be able to take while on Paxlovid and would not be appropriate for her. Pt just lost her  last week, is a bit weepy. Pt notified we would call her tomorrow to see how she is doing. Pt notified she could always call our office if needed too. Pt verbalizes understanding.

## 2022-11-14 NOTE — TELEPHONE ENCOUNTER
Pt was calling back regarding her medication/see SD note and while on the phone with her she fell and hurt herself. Pt crying.  Sent high TE to triage, Azalea Bailey took call

## 2022-11-15 NOTE — TELEPHONE ENCOUNTER
Called and spoke to pt. Pt said that she is feeling slightly better today. Still coughing a lot almost to the point of vomiting. Pt said that she keeps getting worried everyone keeps asking her if she is SOB. Explained to pt that we ask this question to everyone to determine if further evaluation is needed. If not experiencing SOB, no need to worry. Pt stated that she spoke to someone who told her they got an infusion when they had covid. Explained to pt that we have not had MAB for quite some time, so unfortunately, this is not an option for her. Advised to hydrate, rest, monitor sxs. We will call tomorrow to check in. Call sooner if she has any concerns. Pt stated understanding and was thankful.

## 2022-11-16 NOTE — TELEPHONE ENCOUNTER
Patient states she feels a bit better today, the cough is less frequent and she is not coughing until she feels she may vomit. Pt denies SOB, CP, fever. Explained MAB is no longer available. Pt notified to continue to rest, increase fluids, monitor s/s and call if any changes. Pt states she has so much paperwork and other things to do because of her  passing recently. Pt states she will call if her s/s change, ER warnings given. Pt notified we would check on her tomorrow. Pt verbalizes understanding. FYI to TB.

## 2022-11-21 ENCOUNTER — TELEPHONE (OUTPATIENT)
Dept: CARDIOLOGY | Age: 82
End: 2022-11-21

## 2022-11-22 ENCOUNTER — NURSE TRIAGE (OUTPATIENT)
Dept: TELEHEALTH | Age: 82
End: 2022-11-22

## 2022-12-19 ENCOUNTER — ANCILLARY PROCEDURE (OUTPATIENT)
Dept: CARDIOLOGY | Age: 82
End: 2022-12-19
Attending: INTERNAL MEDICINE

## 2022-12-19 DIAGNOSIS — Z95.810 ICD (IMPLANTABLE CARDIOVERTER-DEFIBRILLATOR) IN PLACE: ICD-10-CM

## 2022-12-20 LAB
MDC_IDC_LEAD_IMPLANT_DT: NORMAL
MDC_IDC_LEAD_IMPLANT_DT: NORMAL
MDC_IDC_LEAD_LOCATION: NORMAL
MDC_IDC_LEAD_LOCATION: NORMAL
MDC_IDC_LEAD_LOCATION_DETAIL_1: NORMAL
MDC_IDC_LEAD_LOCATION_DETAIL_1: NORMAL
MDC_IDC_LEAD_MFG: NORMAL
MDC_IDC_LEAD_MFG: NORMAL
MDC_IDC_LEAD_MODEL: NORMAL
MDC_IDC_LEAD_MODEL: NORMAL
MDC_IDC_LEAD_SERIAL: NORMAL
MDC_IDC_LEAD_SERIAL: NORMAL
MDC_IDC_PG_IMPLANT_DTM: NORMAL
MDC_IDC_PG_MFG: NORMAL
MDC_IDC_PG_MODEL: NORMAL
MDC_IDC_PG_SERIAL: NORMAL
MDC_IDC_PG_TYPE: NORMAL
MDC_IDC_SESS_CLINIC_NAME: NORMAL
MDC_IDC_SESS_TYPE: NORMAL

## 2022-12-27 RX ORDER — DULOXETIN HYDROCHLORIDE 60 MG/1
CAPSULE, DELAYED RELEASE ORAL
Qty: 90 CAPSULE | Refills: 0 | Status: SHIPPED | OUTPATIENT
Start: 2022-12-27

## 2022-12-27 NOTE — TELEPHONE ENCOUNTER
DULOXETINE 60 MG Oral Cap DR Particles 90 capsule 0 10/3/2022     Sig: TAKE 1 CAPSULE(60 MG) BY MOUTH EVERY DAY    Sent to pharmacy as: DULoxetine HCl 60 MG Oral Capsule Delayed Release Particles (Cymbalta)    E-Prescribing Status: Receipt confirmed by pharmacy (10/3/2022 12:54 PM CDT)      Last visit 11/7/22

## 2023-01-09 NOTE — TELEPHONE ENCOUNTER
Requesting Amlodipine   LOV: 6/21/17  RTC: 6 months   Last Labs: pp   Filled: 6/2/17 #90 with 0 refills    Future Appointments  Date Time Provider Anabell Shukal   12/20/2017 10:40 AM Shubham Burris MD [facility] [address] [facility] [address]       Refilled pp.      [facility]
25

## 2023-01-13 RX ORDER — LEVOTHYROXINE SODIUM 0.1 MG/1
TABLET ORAL
Qty: 90 TABLET | Refills: 1 | Status: SHIPPED | OUTPATIENT
Start: 2023-01-13

## 2023-01-16 RX ORDER — DULOXETIN HYDROCHLORIDE 60 MG/1
CAPSULE, DELAYED RELEASE ORAL
Qty: 90 CAPSULE | Refills: 0 | OUTPATIENT
Start: 2023-01-16

## 2023-02-01 ENCOUNTER — OFFICE VISIT (OUTPATIENT)
Dept: CARDIOLOGY | Age: 83
End: 2023-02-01

## 2023-02-01 ENCOUNTER — EXTERNAL RECORD (OUTPATIENT)
Dept: HEALTH INFORMATION MANAGEMENT | Facility: OTHER | Age: 83
End: 2023-02-01

## 2023-02-01 ENCOUNTER — ANCILLARY PROCEDURE (OUTPATIENT)
Dept: CARDIOLOGY | Age: 83
End: 2023-02-01
Attending: INTERNAL MEDICINE

## 2023-02-01 VITALS
DIASTOLIC BLOOD PRESSURE: 79 MMHG | HEART RATE: 61 BPM | SYSTOLIC BLOOD PRESSURE: 132 MMHG | HEIGHT: 63 IN | OXYGEN SATURATION: 98 % | WEIGHT: 167.99 LBS | BODY MASS INDEX: 29.77 KG/M2

## 2023-02-01 DIAGNOSIS — Z95.810 ICD (IMPLANTABLE CARDIOVERTER-DEFIBRILLATOR) IN PLACE: Primary | ICD-10-CM

## 2023-02-01 DIAGNOSIS — Z45.02 ENCOUNTER FOR ASSESSMENT OF IMPLANTABLE CARDIOVERTER-DEFIBRILLATOR (ICD): ICD-10-CM

## 2023-02-01 PROCEDURE — 93283 PRGRMG EVAL IMPLANTABLE DFB: CPT | Performed by: INTERNAL MEDICINE

## 2023-02-01 PROCEDURE — 99215 OFFICE O/P EST HI 40 MIN: CPT | Performed by: INTERNAL MEDICINE

## 2023-02-01 ASSESSMENT — PATIENT HEALTH QUESTIONNAIRE - PHQ9
CLINICAL INTERPRETATION OF PHQ2 SCORE: NO FURTHER SCREENING NEEDED
SUM OF ALL RESPONSES TO PHQ9 QUESTIONS 1 AND 2: 1
SUM OF ALL RESPONSES TO PHQ9 QUESTIONS 1 AND 2: 1
2. FEELING DOWN, DEPRESSED OR HOPELESS: SEVERAL DAYS
1. LITTLE INTEREST OR PLEASURE IN DOING THINGS: NOT AT ALL

## 2023-02-02 LAB
MDC_IDC_LEAD_IMPLANT_DT: NORMAL
MDC_IDC_LEAD_IMPLANT_DT: NORMAL
MDC_IDC_LEAD_LOCATION: NORMAL
MDC_IDC_LEAD_LOCATION: NORMAL
MDC_IDC_LEAD_LOCATION_DETAIL_1: NORMAL
MDC_IDC_LEAD_LOCATION_DETAIL_1: NORMAL
MDC_IDC_LEAD_MFG: NORMAL
MDC_IDC_LEAD_MFG: NORMAL
MDC_IDC_LEAD_MODEL: NORMAL
MDC_IDC_LEAD_MODEL: NORMAL
MDC_IDC_LEAD_SERIAL: NORMAL
MDC_IDC_LEAD_SERIAL: NORMAL
MDC_IDC_MSMT_BATTERY_DTM: NORMAL
MDC_IDC_MSMT_BATTERY_REMAINING_LONGEVITY: 50 MO
MDC_IDC_MSMT_CAP_CHARGE_DTM: NORMAL
MDC_IDC_MSMT_CAP_CHARGE_TIME: 7.62
MDC_IDC_MSMT_CAP_CHARGE_TYPE: NORMAL
MDC_IDC_MSMT_CAP_CHARGE_TYPE: NORMAL
MDC_IDC_MSMT_LEADCHNL_RA_IMPEDANCE_VALUE: 450 OHM
MDC_IDC_MSMT_LEADCHNL_RA_PACING_THRESHOLD_AMPLITUDE: 0.75 V
MDC_IDC_MSMT_LEADCHNL_RA_PACING_THRESHOLD_AMPLITUDE: 0.75 V
MDC_IDC_MSMT_LEADCHNL_RA_PACING_THRESHOLD_PULSEWIDTH: 0.5 MS
MDC_IDC_MSMT_LEADCHNL_RA_PACING_THRESHOLD_PULSEWIDTH: 0.5 MS
MDC_IDC_MSMT_LEADCHNL_RA_SENSING_INTR_AMPL: 3.1 MV
MDC_IDC_MSMT_LEADCHNL_RV_IMPEDANCE_VALUE: 387.5 OHM
MDC_IDC_MSMT_LEADCHNL_RV_PACING_THRESHOLD_AMPLITUDE: 1.25 V
MDC_IDC_MSMT_LEADCHNL_RV_PACING_THRESHOLD_AMPLITUDE: 1.25 V
MDC_IDC_MSMT_LEADCHNL_RV_PACING_THRESHOLD_PULSEWIDTH: 0.5 MS
MDC_IDC_MSMT_LEADCHNL_RV_PACING_THRESHOLD_PULSEWIDTH: 0.5 MS
MDC_IDC_MSMT_LEADCHNL_RV_SENSING_INTR_AMPL: 12 MV
MDC_IDC_PG_IMPLANT_DTM: NORMAL
MDC_IDC_PG_MFG: NORMAL
MDC_IDC_PG_MODEL: NORMAL
MDC_IDC_PG_SERIAL: NORMAL
MDC_IDC_PG_TYPE: NORMAL
MDC_IDC_SESS_CLINIC_NAME: NORMAL
MDC_IDC_SESS_DTM: NORMAL
MDC_IDC_SESS_TYPE: NORMAL
MDC_IDC_SET_BRADY_AT_MODE_SWITCH_MODE: NORMAL
MDC_IDC_SET_BRADY_AT_MODE_SWITCH_RATE: 180 {BEATS}/MIN
MDC_IDC_SET_BRADY_HYSTRATE: NORMAL
MDC_IDC_SET_BRADY_LOWRATE: 60 {BEATS}/MIN
MDC_IDC_SET_BRADY_MAX_SENSOR_RATE: 110 {BEATS}/MIN
MDC_IDC_SET_BRADY_MAX_TRACKING_RATE: 120 {BEATS}/MIN
MDC_IDC_SET_BRADY_MODE: NORMAL
MDC_IDC_SET_BRADY_NIGHT_RATE: NORMAL
MDC_IDC_SET_BRADY_PAV_DELAY_LOW: 250 MS
MDC_IDC_SET_BRADY_SAV_DELAY_LOW: 250 MS
MDC_IDC_SET_LEADCHNL_RA_PACING_AMPLITUDE: 2 V
MDC_IDC_SET_LEADCHNL_RA_PACING_CAPTURE_MODE: NORMAL
MDC_IDC_SET_LEADCHNL_RA_PACING_POLARITY: NORMAL
MDC_IDC_SET_LEADCHNL_RA_PACING_PULSEWIDTH: 0.5 MS
MDC_IDC_SET_LEADCHNL_RA_SENSING_ADAPTATION_MODE: NORMAL
MDC_IDC_SET_LEADCHNL_RA_SENSING_POLARITY: NORMAL
MDC_IDC_SET_LEADCHNL_RA_SENSING_SENSITIVITY: 0.3 MV
MDC_IDC_SET_LEADCHNL_RV_PACING_AMPLITUDE: 2.5 V
MDC_IDC_SET_LEADCHNL_RV_PACING_CAPTURE_MODE: NORMAL
MDC_IDC_SET_LEADCHNL_RV_PACING_POLARITY: NORMAL
MDC_IDC_SET_LEADCHNL_RV_PACING_PULSEWIDTH: 0.5 MS
MDC_IDC_SET_LEADCHNL_RV_SENSING_POLARITY: NORMAL
MDC_IDC_SET_LEADCHNL_RV_SENSING_SENSITIVITY: 0.5 MV
MDC_IDC_SET_ZONE_DETECTION_INTERVAL: 300 MS
MDC_IDC_SET_ZONE_TYPE: NORMAL
MDC_IDC_SET_ZONE_VENDOR_TYPE: NORMAL
MDC_IDC_STAT_AT_DTM_END: NORMAL
MDC_IDC_STAT_AT_DTM_START: NORMAL
MDC_IDC_STAT_AT_MODE_SW_COUNT: 1
MDC_IDC_STAT_BRADY_DTM_END: NORMAL
MDC_IDC_STAT_BRADY_DTM_START: NORMAL
MDC_IDC_STAT_BRADY_RA_PERCENT_PACED: 36 %
MDC_IDC_STAT_BRADY_RV_PERCENT_PACED: 0.12 %
MDC_IDC_STAT_EPISODE_RECENT_COUNT: 0
MDC_IDC_STAT_EPISODE_RECENT_COUNT: 0
MDC_IDC_STAT_EPISODE_RECENT_COUNT_DTM_END: NORMAL
MDC_IDC_STAT_EPISODE_RECENT_COUNT_DTM_END: NORMAL
MDC_IDC_STAT_EPISODE_RECENT_COUNT_DTM_START: NORMAL
MDC_IDC_STAT_EPISODE_RECENT_COUNT_DTM_START: NORMAL
MDC_IDC_STAT_EPISODE_TYPE: NORMAL
MDC_IDC_STAT_EPISODE_TYPE: NORMAL
MDC_IDC_STAT_EPISODE_VENDOR_TYPE: NORMAL
MDC_IDC_STAT_HEART_RATE_DTM_END: NORMAL
MDC_IDC_STAT_HEART_RATE_DTM_START: NORMAL
MDC_IDC_STAT_TACHYTHERAPY_ATP_DELIVERED_RECENT: 0
MDC_IDC_STAT_TACHYTHERAPY_RECENT_DTM_END: NORMAL
MDC_IDC_STAT_TACHYTHERAPY_RECENT_DTM_START: NORMAL
MDC_IDC_STAT_TACHYTHERAPY_SHOCKS_ABORTED_RECENT: 0
MDC_IDC_STAT_TACHYTHERAPY_SHOCKS_DELIVERED_RECENT: 0

## 2023-02-03 ENCOUNTER — TELEPHONE (OUTPATIENT)
Dept: INTERNAL MEDICINE CLINIC | Facility: CLINIC | Age: 83
End: 2023-02-03

## 2023-02-06 ENCOUNTER — TELEPHONE (OUTPATIENT)
Dept: PAIN CLINIC | Facility: CLINIC | Age: 83
End: 2023-02-06

## 2023-02-06 NOTE — TELEPHONE ENCOUNTER
Pt calling in stating shes a patient of  and would like to schedule for an injection, I let pt know  is no longer practicing here and she would like to know if theres anyway for dr Petra Poole to do an injection with out being seen in office. Pt scheduled for tomorrow with deanna but was hoping for  to see this message sooner. Please advise.

## 2023-02-06 NOTE — TELEPHONE ENCOUNTER
Contacted pt at this time and advised pt that she has to see Stephanie Millan first before  do the injections. Pt asked if she does not need to see  first to have the injection scheduled,advised pt that as long as she see Stephanie Millan she'll be fine, will go with Stephanie Millan recommendation. Pt Vu and no further questions at this time

## 2023-02-07 ENCOUNTER — OFFICE VISIT (OUTPATIENT)
Dept: PAIN CLINIC | Facility: CLINIC | Age: 83
End: 2023-02-07
Payer: MEDICARE

## 2023-02-07 VITALS — HEART RATE: 61 BPM | SYSTOLIC BLOOD PRESSURE: 126 MMHG | OXYGEN SATURATION: 97 % | DIASTOLIC BLOOD PRESSURE: 68 MMHG

## 2023-02-07 DIAGNOSIS — G56.02 CARPAL TUNNEL SYNDROME OF LEFT WRIST: Primary | ICD-10-CM

## 2023-02-07 DIAGNOSIS — M54.12 CERVICAL RADICULITIS: ICD-10-CM

## 2023-02-07 PROCEDURE — 99214 OFFICE O/P EST MOD 30 MIN: CPT | Performed by: NURSE PRACTITIONER

## 2023-02-07 NOTE — PROGRESS NOTES
Patient presents in office today with reported pain in left arm and wrist    Current pain level reported = depends on how she is moving, can get up to a [id number]    Last reported dose of n/a      Narcotic Contract renewal n/a    Urine Drug screen n/a

## 2023-02-15 ENCOUNTER — NURSE ONLY (OUTPATIENT)
Dept: PAIN CLINIC | Facility: CLINIC | Age: 83
End: 2023-02-15
Payer: MEDICARE

## 2023-02-15 ENCOUNTER — OFFICE VISIT (OUTPATIENT)
Dept: PAIN CLINIC | Facility: CLINIC | Age: 83
End: 2023-02-15
Payer: MEDICARE

## 2023-02-15 VITALS — DIASTOLIC BLOOD PRESSURE: 80 MMHG | OXYGEN SATURATION: 96 % | HEART RATE: 67 BPM | SYSTOLIC BLOOD PRESSURE: 110 MMHG

## 2023-02-15 DIAGNOSIS — G56.03 BILATERAL CARPAL TUNNEL SYNDROME: Primary | ICD-10-CM

## 2023-02-15 RX ORDER — TRIAMCINOLONE ACETONIDE 40 MG/ML
40 INJECTION, SUSPENSION INTRA-ARTICULAR; INTRAMUSCULAR ONCE
Status: COMPLETED | OUTPATIENT
Start: 2023-02-15 | End: 2023-02-15

## 2023-02-15 RX ORDER — EMPAGLIFLOZIN 25 MG/1
1 TABLET, FILM COATED ORAL AS DIRECTED
COMMUNITY
Start: 2021-12-28 | End: 2023-02-15

## 2023-02-15 RX ORDER — BUPIVACAINE HYDROCHLORIDE 5 MG/ML
4 INJECTION, SOLUTION EPIDURAL; INTRACAUDAL ONCE
Status: COMPLETED | OUTPATIENT
Start: 2023-02-15 | End: 2023-02-15

## 2023-02-15 RX ORDER — LIDOCAINE HYDROCHLORIDE 10 MG/ML
10 INJECTION, SOLUTION INFILTRATION; PERINEURAL ONCE
Status: COMPLETED | OUTPATIENT
Start: 2023-02-15 | End: 2023-02-15

## 2023-02-15 NOTE — PROCEDURES
Date of procedure: February 15, 2023    Preop diagnosis: Carpal tunnel syndrome    Postop diagnosis: Same    Procedure: Left ultrasound-guided carpal tunnel injection    Surgeon: Reno Cunningham    Anesthesia: Lidocaine    EBL: 0    Indication: The patient is an 81-year-old female with a history of carpal tunnel syndrome who has obtained injections in the past with good symptomatic relief. Procedure detail: Informed consent was obtained. Timeout was done. The skin overlying the left wrist was prepped in usual sterile fashion. Subsequently, a 27-gauge needle was advanced with imaging guidance towards the median nerve. After negative aspiration for hematoma mixture of 40 mg triamcinolone with 2 cc of 1% lidocaine was injected. There is excellent perineural spread. All relevant imaging saved to the PACS system. Patient tolerated procedure well.     Reno Cunningham MD

## 2023-02-15 NOTE — PROGRESS NOTES
Timeout completed prior to procedure @ 4519. Participants present for timeout:  Dr. Renata Flower , and patient.

## 2023-02-15 NOTE — PROGRESS NOTES
Patient presents in office today with reported pain in left wrist    Current pain level reported = 10/10    Last reported dose of nothing      Narcotic Contract renewal na    Urine Drug screen na

## 2023-03-08 ENCOUNTER — LAB ENCOUNTER (OUTPATIENT)
Dept: LAB | Age: 83
End: 2023-03-08
Attending: INTERNAL MEDICINE
Payer: MEDICARE

## 2023-03-08 DIAGNOSIS — E11.59 CONTROLLED TYPE 2 DIABETES MELLITUS WITH OTHER CIRCULATORY COMPLICATION, WITHOUT LONG-TERM CURRENT USE OF INSULIN (HCC): ICD-10-CM

## 2023-03-08 DIAGNOSIS — R74.8 ELEVATED ALKALINE PHOSPHATASE LEVEL: ICD-10-CM

## 2023-03-08 DIAGNOSIS — E11.9 NEW ONSET TYPE 2 DIABETES MELLITUS (HCC): ICD-10-CM

## 2023-03-08 LAB
ALBUMIN SERPL-MCNC: 3.4 G/DL (ref 3.4–5)
ALBUMIN/GLOB SERPL: 0.9 {RATIO} (ref 1–2)
ALP LIVER SERPL-CCNC: 73 U/L
ALT SERPL-CCNC: 22 U/L
ANION GAP SERPL CALC-SCNC: 4 MMOL/L (ref 0–18)
AST SERPL-CCNC: 18 U/L (ref 15–37)
BILIRUB SERPL-MCNC: 0.5 MG/DL (ref 0.1–2)
BUN BLD-MCNC: 20 MG/DL (ref 7–18)
CALCIUM BLD-MCNC: 9.6 MG/DL (ref 8.5–10.1)
CHLORIDE SERPL-SCNC: 111 MMOL/L (ref 98–112)
CO2 SERPL-SCNC: 23 MMOL/L (ref 21–32)
CREAT BLD-MCNC: 0.92 MG/DL
CREAT UR-SCNC: 178 MG/DL
EST. AVERAGE GLUCOSE BLD GHB EST-MCNC: 128 MG/DL (ref 68–126)
FASTING STATUS PATIENT QL REPORTED: YES
GFR SERPLBLD BASED ON 1.73 SQ M-ARVRAT: 63 ML/MIN/1.73M2 (ref 60–?)
GLOBULIN PLAS-MCNC: 3.7 G/DL (ref 2.8–4.4)
GLUCOSE BLD-MCNC: 122 MG/DL (ref 70–99)
HBA1C MFR BLD: 6.1 % (ref ?–5.7)
MICROALBUMIN UR-MCNC: 1.64 MG/DL
MICROALBUMIN/CREAT 24H UR-RTO: 9.2 UG/MG (ref ?–30)
OSMOLALITY SERPL CALC.SUM OF ELEC: 290 MOSM/KG (ref 275–295)
POTASSIUM SERPL-SCNC: 4.1 MMOL/L (ref 3.5–5.1)
PROT SERPL-MCNC: 7.1 G/DL (ref 6.4–8.2)
SODIUM SERPL-SCNC: 138 MMOL/L (ref 136–145)

## 2023-03-08 PROCEDURE — 82043 UR ALBUMIN QUANTITATIVE: CPT

## 2023-03-08 PROCEDURE — 80053 COMPREHEN METABOLIC PANEL: CPT

## 2023-03-08 PROCEDURE — 83036 HEMOGLOBIN GLYCOSYLATED A1C: CPT

## 2023-03-08 PROCEDURE — 36415 COLL VENOUS BLD VENIPUNCTURE: CPT

## 2023-03-08 PROCEDURE — 82570 ASSAY OF URINE CREATININE: CPT

## 2023-03-08 PROCEDURE — 84080 ASSAY ALKALINE PHOSPHATASES: CPT

## 2023-03-08 PROCEDURE — 84075 ASSAY ALKALINE PHOSPHATASE: CPT

## 2023-03-10 ENCOUNTER — OFFICE VISIT (OUTPATIENT)
Dept: INTERNAL MEDICINE CLINIC | Facility: CLINIC | Age: 83
End: 2023-03-10
Payer: MEDICARE

## 2023-03-10 VITALS
TEMPERATURE: 97 F | BODY MASS INDEX: 30.55 KG/M2 | HEIGHT: 62 IN | SYSTOLIC BLOOD PRESSURE: 128 MMHG | OXYGEN SATURATION: 96 % | RESPIRATION RATE: 18 BRPM | DIASTOLIC BLOOD PRESSURE: 72 MMHG | WEIGHT: 166 LBS | HEART RATE: 61 BPM

## 2023-03-10 DIAGNOSIS — M85.80 OSTEOPENIA, UNSPECIFIED LOCATION: ICD-10-CM

## 2023-03-10 DIAGNOSIS — I47.20 VENTRICULAR TACHYCARDIA (HCC): ICD-10-CM

## 2023-03-10 DIAGNOSIS — E78.00 HYPERCHOLESTEREMIA: ICD-10-CM

## 2023-03-10 DIAGNOSIS — F33.9 DEPRESSION, RECURRENT (HCC): ICD-10-CM

## 2023-03-10 DIAGNOSIS — E66.9 DIABETES MELLITUS TYPE 2 IN OBESE (HCC): ICD-10-CM

## 2023-03-10 DIAGNOSIS — I42.2 HYPERTROPHIC CARDIOMYOPATHY (HCC): ICD-10-CM

## 2023-03-10 DIAGNOSIS — E78.5 DYSLIPIDEMIA: ICD-10-CM

## 2023-03-10 DIAGNOSIS — Z00.00 ENCOUNTER FOR ANNUAL HEALTH EXAMINATION: Primary | ICD-10-CM

## 2023-03-10 DIAGNOSIS — I65.23 BILATERAL CAROTID ARTERY STENOSIS: ICD-10-CM

## 2023-03-10 DIAGNOSIS — E55.9 VITAMIN D DEFICIENCY: ICD-10-CM

## 2023-03-10 DIAGNOSIS — I10 PRIMARY HYPERTENSION: ICD-10-CM

## 2023-03-10 DIAGNOSIS — E11.69 DIABETES MELLITUS TYPE 2 IN OBESE (HCC): ICD-10-CM

## 2023-03-10 DIAGNOSIS — E53.8 LOW VITAMIN B12 LEVEL: ICD-10-CM

## 2023-03-10 DIAGNOSIS — G56.03 BILATERAL CARPAL TUNNEL SYNDROME: ICD-10-CM

## 2023-03-10 DIAGNOSIS — E03.8 OTHER SPECIFIED HYPOTHYROIDISM: ICD-10-CM

## 2023-03-10 PROBLEM — E13.9 DIABETES 1.5, MANAGED AS TYPE 2 (HCC): Status: RESOLVED | Noted: 2021-10-22 | Resolved: 2023-03-10

## 2023-03-10 PROBLEM — E11.9 NEW ONSET TYPE 2 DIABETES MELLITUS (HCC): Status: RESOLVED | Noted: 2021-06-15 | Resolved: 2023-03-10

## 2023-03-10 PROBLEM — R73.09 ELEVATED GLUCOSE: Status: RESOLVED | Noted: 2021-01-29 | Resolved: 2023-03-10

## 2023-03-10 PROBLEM — R55 SYNCOPE: Status: RESOLVED | Noted: 2018-03-21 | Resolved: 2023-03-10

## 2023-03-10 PROBLEM — R06.02 SHORTNESS OF BREATH: Status: RESOLVED | Noted: 2021-10-22 | Resolved: 2023-03-10

## 2023-03-11 PROBLEM — E11.69 DIABETES MELLITUS TYPE 2 IN OBESE: Status: ACTIVE | Noted: 2021-06-15

## 2023-03-11 PROBLEM — I42.2 HYPERTROPHIC CARDIOMYOPATHY (HCC): Status: ACTIVE | Noted: 2018-07-11

## 2023-03-11 PROBLEM — E11.69 DIABETES MELLITUS TYPE 2 IN OBESE (HCC): Status: ACTIVE | Noted: 2021-06-15

## 2023-03-11 PROBLEM — E66.9 DIABETES MELLITUS TYPE 2 IN OBESE  (HCC): Status: ACTIVE | Noted: 2021-06-15

## 2023-03-11 PROBLEM — E11.69 DIABETES MELLITUS TYPE 2 IN OBESE  (HCC): Status: ACTIVE | Noted: 2021-06-15

## 2023-03-11 PROBLEM — E66.9 DIABETES MELLITUS TYPE 2 IN OBESE (HCC): Status: ACTIVE | Noted: 2021-06-15

## 2023-03-11 PROBLEM — E66.9 DIABETES MELLITUS TYPE 2 IN OBESE: Status: ACTIVE | Noted: 2021-06-15

## 2023-03-11 PROBLEM — F33.9 DEPRESSION, RECURRENT: Status: ACTIVE | Noted: 2023-03-11

## 2023-03-11 PROBLEM — F33.9 DEPRESSION, RECURRENT (HCC): Status: ACTIVE | Noted: 2023-03-11

## 2023-03-11 PROBLEM — I42.9 CARDIOMYOPATHY (HCC): Status: RESOLVED | Noted: 2018-07-11 | Resolved: 2023-03-11

## 2023-03-11 LAB
ALK-PHOSPHATASE BONE CALC: 32 U/L
ALK-PHOSPHATASE LIVER CALC: 47 U/L
ALK-PHOSPHATASE OTHER CALC: 0 U/L
ALKALINE PHOSPHATASE: 79 U/L

## 2023-03-24 ENCOUNTER — ANCILLARY PROCEDURE (OUTPATIENT)
Dept: CARDIOLOGY | Age: 83
End: 2023-03-24
Attending: INTERNAL MEDICINE

## 2023-03-24 ENCOUNTER — TELEPHONE (OUTPATIENT)
Dept: INTERNAL MEDICINE CLINIC | Facility: CLINIC | Age: 83
End: 2023-03-24

## 2023-03-24 DIAGNOSIS — Z95.810 ICD (IMPLANTABLE CARDIOVERTER-DEFIBRILLATOR) IN PLACE: ICD-10-CM

## 2023-03-24 PROCEDURE — 93295 DEV INTERROG REMOTE 1/2/MLT: CPT | Performed by: INTERNAL MEDICINE

## 2023-03-24 NOTE — TELEPHONE ENCOUNTER
Pt is seeing cardiologist on the same month asking to see if TB will agree to either see her a month before or after so pt doesn't have to see dr. Roman Guerin close together for the same issues [phone]

## 2023-03-24 NOTE — TELEPHONE ENCOUNTER
Pt scheduled with TB 6/20/23 for 3 mo f/u. Please clarify what pt is asking. Is she seeing cards in June as well and wanting to be seen sooner or later by TB?

## 2023-03-28 NOTE — TELEPHONE ENCOUNTER
Correct! She doesn't want to have to many appt so close together. So will TB agree to see her a month before or a month later since she is scheduled to see her cardiologist the same time and she needs to see TB. Would TB rather see her after the cardiologist appt or before?

## 2023-03-29 NOTE — TELEPHONE ENCOUNTER
Per TB, she can see her after she sees cards.  Believe pt was wanting to reschedule to following month if ok with TB.

## 2023-04-13 RX ORDER — OLMESARTAN MEDOXOMIL 5 MG/1
10 TABLET ORAL DAILY
Qty: 180 TABLET | Refills: 3 | Status: SHIPPED | OUTPATIENT
Start: 2023-04-13

## 2023-04-18 DIAGNOSIS — I42.2 HYPERTROPHIC CARDIOMYOPATHY (CMD): ICD-10-CM

## 2023-04-18 RX ORDER — METOPROLOL SUCCINATE 100 MG/1
100 TABLET, EXTENDED RELEASE ORAL DAILY
Qty: 90 TABLET | Refills: 3 | Status: SHIPPED | OUTPATIENT
Start: 2023-04-18

## 2023-05-03 ENCOUNTER — LAB SERVICES (OUTPATIENT)
Dept: LAB | Age: 83
End: 2023-05-03

## 2023-05-03 ENCOUNTER — ANCILLARY PROCEDURE (OUTPATIENT)
Dept: CARDIOLOGY | Age: 83
End: 2023-05-03
Attending: INTERNAL MEDICINE

## 2023-05-03 DIAGNOSIS — E55.9 VITAMIN D DEFICIENCY: ICD-10-CM

## 2023-05-03 DIAGNOSIS — I50.32 CHRONIC HEART FAILURE WITH PRESERVED EJECTION FRACTION (HFPEF) (CMD): ICD-10-CM

## 2023-05-03 DIAGNOSIS — I42.2 HYPERTROPHIC CARDIOMYOPATHY (CMD): ICD-10-CM

## 2023-05-03 DIAGNOSIS — I65.23 ASYMPTOMATIC CAROTID ARTERY STENOSIS, BILATERAL: ICD-10-CM

## 2023-05-03 DIAGNOSIS — E78.00 PURE HYPERCHOLESTEROLEMIA: ICD-10-CM

## 2023-05-03 LAB
25(OH)D3+25(OH)D2 SERPL-MCNC: 71.9 NG/ML (ref 30–100)
ALBUMIN SERPL-MCNC: 3.4 G/DL (ref 3.6–5.1)
ALBUMIN/GLOB SERPL: 0.9 {RATIO} (ref 1–2.4)
ALP SERPL-CCNC: 80 UNITS/L (ref 45–117)
ALT SERPL-CCNC: 21 UNITS/L
ANION GAP SERPL CALC-SCNC: 6 MMOL/L (ref 7–19)
AST SERPL-CCNC: 19 UNITS/L
BASOPHILS # BLD: 0.1 K/MCL (ref 0–0.3)
BASOPHILS NFR BLD: 1 %
BILIRUB SERPL-MCNC: 0.6 MG/DL (ref 0.2–1)
BUN SERPL-MCNC: 13 MG/DL (ref 6–20)
BUN/CREAT SERPL: 16 (ref 7–25)
CALCIUM SERPL-MCNC: 9.6 MG/DL (ref 8.4–10.2)
CHLORIDE SERPL-SCNC: 110 MMOL/L (ref 97–110)
CHOLEST SERPL-MCNC: 146 MG/DL
CHOLEST/HDLC SERPL: 1.8 {RATIO}
CO2 SERPL-SCNC: 28 MMOL/L (ref 21–32)
CREAT SERPL-MCNC: 0.8 MG/DL (ref 0.51–0.95)
DEPRECATED RDW RBC: 47.3 FL (ref 39–50)
EOSINOPHIL # BLD: 0.3 K/MCL (ref 0–0.5)
EOSINOPHIL NFR BLD: 3 %
ERYTHROCYTE [DISTWIDTH] IN BLOOD: 13.2 % (ref 11–15)
FASTING DURATION TIME PATIENT: 10 HOURS (ref 0–999)
GFR SERPLBLD BASED ON 1.73 SQ M-ARVRAT: 74 ML/MIN
GLOBULIN SER-MCNC: 4 G/DL (ref 2–4)
GLUCOSE SERPL-MCNC: 120 MG/DL (ref 70–99)
HCT VFR BLD CALC: 43.5 % (ref 36–46.5)
HDLC SERPL-MCNC: 80 MG/DL
HGB BLD-MCNC: 14.2 G/DL (ref 12–15.5)
IMM GRANULOCYTES # BLD AUTO: 0 K/MCL (ref 0–0.2)
IMM GRANULOCYTES # BLD: 0 %
LDLC SERPL CALC-MCNC: 49 MG/DL
LYMPHOCYTES # BLD: 2.3 K/MCL (ref 1–4)
LYMPHOCYTES NFR BLD: 27 %
MCH RBC QN AUTO: 32.1 PG (ref 26–34)
MCHC RBC AUTO-ENTMCNC: 32.6 G/DL (ref 32–36.5)
MCV RBC AUTO: 98.2 FL (ref 78–100)
MONOCYTES # BLD: 0.5 K/MCL (ref 0.3–0.9)
MONOCYTES NFR BLD: 7 %
NEUTROPHILS # BLD: 5.1 K/MCL (ref 1.8–7.7)
NEUTROPHILS NFR BLD: 62 %
NONHDLC SERPL-MCNC: 66 MG/DL
NRBC BLD MANUAL-RTO: 0 /100 WBC
NT-PROBNP SERPL-MCNC: 617 PG/ML
PLATELET # BLD AUTO: 275 K/MCL (ref 140–450)
POTASSIUM SERPL-SCNC: 5 MMOL/L (ref 3.4–5.1)
PROT SERPL-MCNC: 7.4 G/DL (ref 6.4–8.2)
RBC # BLD: 4.43 MIL/MCL (ref 4–5.2)
SODIUM SERPL-SCNC: 139 MMOL/L (ref 135–145)
TRIGL SERPL-MCNC: 84 MG/DL
TSH SERPL-ACNC: 0.61 MCUNITS/ML (ref 0.35–5)
WBC # BLD: 8.3 K/MCL (ref 4.2–11)

## 2023-05-03 PROCEDURE — 93880 EXTRACRANIAL BILAT STUDY: CPT | Performed by: INTERNAL MEDICINE

## 2023-05-03 PROCEDURE — 83880 ASSAY OF NATRIURETIC PEPTIDE: CPT | Performed by: CLINICAL MEDICAL LABORATORY

## 2023-05-03 PROCEDURE — 36415 COLL VENOUS BLD VENIPUNCTURE: CPT | Performed by: CLINICAL MEDICAL LABORATORY

## 2023-05-03 PROCEDURE — 84443 ASSAY THYROID STIM HORMONE: CPT | Performed by: CLINICAL MEDICAL LABORATORY

## 2023-05-03 PROCEDURE — 80053 COMPREHEN METABOLIC PANEL: CPT | Performed by: CLINICAL MEDICAL LABORATORY

## 2023-05-03 PROCEDURE — 80061 LIPID PANEL: CPT | Performed by: CLINICAL MEDICAL LABORATORY

## 2023-05-03 PROCEDURE — 85025 COMPLETE CBC W/AUTO DIFF WBC: CPT | Performed by: CLINICAL MEDICAL LABORATORY

## 2023-05-03 PROCEDURE — 82306 VITAMIN D 25 HYDROXY: CPT | Performed by: CLINICAL MEDICAL LABORATORY

## 2023-05-10 ENCOUNTER — OFFICE VISIT (OUTPATIENT)
Dept: CARDIOLOGY | Age: 83
End: 2023-05-10

## 2023-05-10 VITALS
HEIGHT: 63 IN | RESPIRATION RATE: 18 BRPM | BODY MASS INDEX: 30.1 KG/M2 | HEART RATE: 79 BPM | WEIGHT: 169.86 LBS | DIASTOLIC BLOOD PRESSURE: 72 MMHG | SYSTOLIC BLOOD PRESSURE: 126 MMHG

## 2023-05-10 DIAGNOSIS — E78.00 PURE HYPERCHOLESTEROLEMIA: ICD-10-CM

## 2023-05-10 DIAGNOSIS — E55.9 VITAMIN D DEFICIENCY: ICD-10-CM

## 2023-05-10 DIAGNOSIS — I50.32 CHRONIC HEART FAILURE WITH PRESERVED EJECTION FRACTION (HFPEF) (CMD): Primary | ICD-10-CM

## 2023-05-10 PROBLEM — Z86.69 HISTORY OF SLEEP APNEA: Chronic | Status: ACTIVE | Noted: 2023-05-10

## 2023-05-10 PROCEDURE — 99214 OFFICE O/P EST MOD 30 MIN: CPT | Performed by: INTERNAL MEDICINE

## 2023-05-10 SDOH — HEALTH STABILITY: PHYSICAL HEALTH: ON AVERAGE, HOW MANY DAYS PER WEEK DO YOU ENGAGE IN MODERATE TO STRENUOUS EXERCISE (LIKE A BRISK WALK)?: 0 DAYS

## 2023-05-10 SDOH — HEALTH STABILITY: PHYSICAL HEALTH: ON AVERAGE, HOW MANY MINUTES DO YOU ENGAGE IN EXERCISE AT THIS LEVEL?: 0 MIN

## 2023-05-10 ASSESSMENT — ENCOUNTER SYMPTOMS
COUGH: 0
ALLERGIC/IMMUNOLOGIC COMMENTS: NO NEW FOOD ALLERGIES
WEIGHT LOSS: 0
BRUISES/BLEEDS EASILY: 0
CHILLS: 0
SLEEP DISTURBANCES DUE TO BREATHING: 0
CONSTIPATION: 0
FEVER: 0
LIGHT-HEADEDNESS: 1
BLOATING: 0
WEIGHT GAIN: 1
HEMOPTYSIS: 0
SHORTNESS OF BREATH: 0
DIARRHEA: 0
ABDOMINAL PAIN: 0
DEPRESSION: 0
SUSPICIOUS LESIONS: 0
BACK PAIN: 1
HEADACHES: 0
INSOMNIA: 0
HEMATOCHEZIA: 0
DIZZINESS: 1

## 2023-05-10 ASSESSMENT — PATIENT HEALTH QUESTIONNAIRE - PHQ9
CLINICAL INTERPRETATION OF PHQ2 SCORE: NO FURTHER SCREENING NEEDED
1. LITTLE INTEREST OR PLEASURE IN DOING THINGS: NOT AT ALL
SUM OF ALL RESPONSES TO PHQ9 QUESTIONS 1 AND 2: 0
2. FEELING DOWN, DEPRESSED OR HOPELESS: NOT AT ALL
SUM OF ALL RESPONSES TO PHQ9 QUESTIONS 1 AND 2: 0

## 2023-05-17 NOTE — TELEPHONE ENCOUNTER
Last VISIT - 3/10/23 Wellness visit    Last CPE - 3/10/23    Last REFILL -     DULOXETINE 60 MG Oral Cap DR Particles 90 capsule 0 12/27/2022     Last LABS - 5/3/23 cbc, thyroid, cmp, lipid    Future Appointments   Date Time Provider Anabell Shukla   7/7/2023 11:00 AM Erum Castelan MD [facility]     Per PROTOCOL? None    Please Approve or Deny.

## 2023-05-18 RX ORDER — DULOXETIN HYDROCHLORIDE 60 MG/1
CAPSULE, DELAYED RELEASE ORAL
Qty: 90 CAPSULE | Refills: 0 | Status: SHIPPED | OUTPATIENT
Start: 2023-05-18

## 2023-06-30 ENCOUNTER — TELEPHONE (OUTPATIENT)
Dept: CARDIOLOGY | Age: 83
End: 2023-06-30

## 2023-06-30 ENCOUNTER — ANCILLARY PROCEDURE (OUTPATIENT)
Dept: CARDIOLOGY | Age: 83
End: 2023-06-30
Attending: INTERNAL MEDICINE

## 2023-06-30 DIAGNOSIS — Z95.810 ICD (IMPLANTABLE CARDIOVERTER-DEFIBRILLATOR) IN PLACE: ICD-10-CM

## 2023-07-03 ENCOUNTER — LAB ENCOUNTER (OUTPATIENT)
Dept: LAB | Age: 83
End: 2023-07-03
Attending: INTERNAL MEDICINE
Payer: MEDICARE

## 2023-07-03 DIAGNOSIS — I10 PRIMARY HYPERTENSION: ICD-10-CM

## 2023-07-03 DIAGNOSIS — E53.8 LOW VITAMIN B12 LEVEL: ICD-10-CM

## 2023-07-03 DIAGNOSIS — E11.69 DIABETES MELLITUS TYPE 2 IN OBESE: ICD-10-CM

## 2023-07-03 DIAGNOSIS — E55.9 VITAMIN D DEFICIENCY: ICD-10-CM

## 2023-07-03 DIAGNOSIS — E66.9 DIABETES MELLITUS TYPE 2 IN OBESE: ICD-10-CM

## 2023-07-03 LAB
ANION GAP SERPL CALC-SCNC: 4 MMOL/L (ref 0–18)
BUN BLD-MCNC: 16 MG/DL (ref 7–18)
CALCIUM BLD-MCNC: 9.5 MG/DL (ref 8.5–10.1)
CHLORIDE SERPL-SCNC: 110 MMOL/L (ref 98–112)
CHOLEST SERPL-MCNC: 148 MG/DL (ref ?–200)
CO2 SERPL-SCNC: 24 MMOL/L (ref 21–32)
CREAT BLD-MCNC: 0.9 MG/DL
EST. AVERAGE GLUCOSE BLD GHB EST-MCNC: 123 MG/DL (ref 68–126)
FASTING PATIENT LIPID ANSWER: YES
FASTING STATUS PATIENT QL REPORTED: YES
GFR SERPLBLD BASED ON 1.73 SQ M-ARVRAT: 65 ML/MIN/1.73M2 (ref 60–?)
GLUCOSE BLD-MCNC: 125 MG/DL (ref 70–99)
HBA1C MFR BLD: 5.9 % (ref ?–5.7)
HDLC SERPL-MCNC: 66 MG/DL (ref 40–59)
LDLC SERPL CALC-MCNC: 64 MG/DL (ref ?–100)
NONHDLC SERPL-MCNC: 82 MG/DL (ref ?–130)
OSMOLALITY SERPL CALC.SUM OF ELEC: 289 MOSM/KG (ref 275–295)
POTASSIUM SERPL-SCNC: 4.4 MMOL/L (ref 3.5–5.1)
SODIUM SERPL-SCNC: 138 MMOL/L (ref 136–145)
TRIGL SERPL-MCNC: 99 MG/DL (ref 30–149)
VIT B12 SERPL-MCNC: 814 PG/ML (ref 193–986)
VIT D+METAB SERPL-MCNC: 59.1 NG/ML (ref 30–100)
VLDLC SERPL CALC-MCNC: 15 MG/DL (ref 0–30)

## 2023-07-03 PROCEDURE — 83036 HEMOGLOBIN GLYCOSYLATED A1C: CPT

## 2023-07-03 PROCEDURE — 80061 LIPID PANEL: CPT

## 2023-07-03 PROCEDURE — 82306 VITAMIN D 25 HYDROXY: CPT

## 2023-07-03 PROCEDURE — 82607 VITAMIN B-12: CPT

## 2023-07-03 PROCEDURE — 80048 BASIC METABOLIC PNL TOTAL CA: CPT

## 2023-07-03 PROCEDURE — 36415 COLL VENOUS BLD VENIPUNCTURE: CPT

## 2023-07-07 ENCOUNTER — OFFICE VISIT (OUTPATIENT)
Dept: INTERNAL MEDICINE CLINIC | Facility: CLINIC | Age: 83
End: 2023-07-07
Payer: MEDICARE

## 2023-07-07 VITALS
TEMPERATURE: 97 F | OXYGEN SATURATION: 95 % | HEIGHT: 61.81 IN | BODY MASS INDEX: 31.35 KG/M2 | DIASTOLIC BLOOD PRESSURE: 52 MMHG | SYSTOLIC BLOOD PRESSURE: 108 MMHG | RESPIRATION RATE: 16 BRPM | HEART RATE: 64 BPM | WEIGHT: 170.38 LBS

## 2023-07-07 DIAGNOSIS — I10 PRIMARY HYPERTENSION: ICD-10-CM

## 2023-07-07 DIAGNOSIS — F33.9 DEPRESSION, RECURRENT (HCC): ICD-10-CM

## 2023-07-07 DIAGNOSIS — E11.69 DIABETES MELLITUS TYPE 2 IN OBESE: Primary | ICD-10-CM

## 2023-07-07 DIAGNOSIS — E66.9 DIABETES MELLITUS TYPE 2 IN OBESE: Primary | ICD-10-CM

## 2023-07-07 DIAGNOSIS — E78.5 DYSLIPIDEMIA: ICD-10-CM

## 2023-07-07 PROCEDURE — 1126F AMNT PAIN NOTED NONE PRSNT: CPT | Performed by: INTERNAL MEDICINE

## 2023-07-07 PROCEDURE — 99214 OFFICE O/P EST MOD 30 MIN: CPT | Performed by: INTERNAL MEDICINE

## 2023-07-14 RX ORDER — ATORVASTATIN CALCIUM 40 MG/1
40 TABLET, FILM COATED ORAL DAILY
Qty: 90 TABLET | Refills: 3 | Status: SHIPPED | OUTPATIENT
Start: 2023-07-14

## 2023-07-14 RX ORDER — LEVOTHYROXINE SODIUM 0.1 MG/1
TABLET ORAL
Qty: 90 TABLET | Refills: 1 | Status: SHIPPED | OUTPATIENT
Start: 2023-07-14

## 2023-07-14 NOTE — TELEPHONE ENCOUNTER
Last VISIT - 7/7/23 f/u for chronic issues    Last CPE - 3/10/23    Last REFILL -     LEVOTHYROXINE 100 MCG Oral Tab 90 tablet 1 1/13/2023     Last LABS - 7/3/23 bmp, lipid, A1c, cbc 3/8/23 cmp    Future Appointments   Date Time Provider Anabell Shukla   11/8/2023 10:40 AM Anjali Sosa MD [facility]     Per PROTOCOL? Failed    Please Approve or Deny.

## 2023-07-14 NOTE — TELEPHONE ENCOUNTER
Last VISIT 07/07/2023    Last CPE 03/10/2023    Last REFILL  Authorizing Provider Encounter Provider Ordering Provider   MD Lesly Daniel MD Merna Radish, MD     Medication Quantity Refills Start End   METFORMIN 500 MG Oral Tab 90 tablet 1 1/16/2023    Sig:   TAKE 1 TABLET(500 MG) BY MOUTH DAILY WITH BREAKFAST       Last LABS  [facility]- 07/03/2023    Future Appointments   Date Time Provider Anabell Shukla   11/8/2023 10:40 AM Lesly Dumont MD [facility]         Per PROTOCOL? Met- Refill pended    Please Approve or Deny.

## 2023-07-15 ENCOUNTER — E-ADVICE (OUTPATIENT)
Dept: CARDIOLOGY | Age: 83
End: 2023-07-15

## 2023-07-17 ENCOUNTER — TELEPHONE (OUTPATIENT)
Dept: CARDIOLOGY | Age: 83
End: 2023-07-17

## 2023-07-24 ENCOUNTER — TELEPHONE (OUTPATIENT)
Dept: PAIN CLINIC | Facility: CLINIC | Age: 83
End: 2023-07-24

## 2023-07-24 DIAGNOSIS — G56.02 CARPAL TUNNEL SYNDROME OF LEFT WRIST: Primary | ICD-10-CM

## 2023-08-07 RX ORDER — DULOXETIN HYDROCHLORIDE 60 MG/1
CAPSULE, DELAYED RELEASE ORAL
Qty: 90 CAPSULE | Refills: 1 | Status: SHIPPED | OUTPATIENT
Start: 2023-08-07

## 2023-08-11 ENCOUNTER — OFFICE VISIT (OUTPATIENT)
Dept: PAIN CLINIC | Facility: CLINIC | Age: 83
End: 2023-08-11
Payer: MEDICARE

## 2023-08-11 ENCOUNTER — NURSE ONLY (OUTPATIENT)
Dept: PAIN CLINIC | Facility: CLINIC | Age: 83
End: 2023-08-11
Payer: MEDICARE

## 2023-08-11 VITALS — DIASTOLIC BLOOD PRESSURE: 60 MMHG | OXYGEN SATURATION: 96 % | SYSTOLIC BLOOD PRESSURE: 120 MMHG | HEART RATE: 71 BPM

## 2023-08-11 DIAGNOSIS — G56.02 CARPAL TUNNEL SYNDROME OF LEFT WRIST: Primary | ICD-10-CM

## 2023-08-11 DIAGNOSIS — G56.03 BILATERAL CARPAL TUNNEL SYNDROME: ICD-10-CM

## 2023-08-11 RX ORDER — LIDOCAINE HYDROCHLORIDE 10 MG/ML
4 INJECTION, SOLUTION INFILTRATION; PERINEURAL ONCE
Status: COMPLETED | OUTPATIENT
Start: 2023-08-11 | End: 2023-08-11

## 2023-08-11 RX ORDER — BUPIVACAINE HYDROCHLORIDE 5 MG/ML
1 INJECTION, SOLUTION EPIDURAL; INTRACAUDAL ONCE
Status: COMPLETED | OUTPATIENT
Start: 2023-08-11 | End: 2023-08-11

## 2023-08-11 RX ORDER — TRIAMCINOLONE ACETONIDE 40 MG/ML
40 INJECTION, SUSPENSION INTRA-ARTICULAR; INTRAMUSCULAR ONCE
Status: COMPLETED | OUTPATIENT
Start: 2023-08-11 | End: 2023-08-11

## 2023-08-11 NOTE — PROCEDURES
Date of procedure: August 11, 2023    Preop diagnosis: Carpal tunnel syndrome    Postop diagnosis: Same    Procedure: Left carpal tunnel injection with ultrasound guidance    Surgeon: Jaquan Alegre    Anesthesia: Lidocaine    EBL: 0    Indication: Patient is a 59-year-old female with history of carpal tunnel syndrome    Procedure detail: Informed consent was obtained. Timeout was done. The skin overlying the left wrist was prepped in the usual sterile fashion. Subsequently, a 27-gauge needle was advanced with image guidance into the carpal tunnel. After negative aspiration of the hematoma mixture of 40 mg of triamcinolone with 1 cc of 0.5% ropivacaine was injected. Needle was then withdrawn back to contact. All relevant imaging saved to the PACS system. No objective or subjective weakness.     Stan Sam MD

## 2023-08-11 NOTE — PROGRESS NOTES
Patient presents in office today with reported pain in left wrist    Current pain level reported = No pain currently, but goes numb occasionally     Last reported dose of NA this morning      Narcotic Contract renewal NA    Urine Drug screen NA

## 2023-08-11 NOTE — PROGRESS NOTES
Timeout completed prior to procedure @ 8036. Participants present for timeout:  Dr. Jesse Moreno, and patient.

## 2023-09-11 RX ORDER — ERGOCALCIFEROL 1.25 MG/1
1.25 CAPSULE ORAL
Qty: 12 CAPSULE | Refills: 3 | Status: SHIPPED | OUTPATIENT
Start: 2023-09-11

## 2023-09-14 ENCOUNTER — TELEPHONE (OUTPATIENT)
Dept: CARDIOLOGY | Age: 83
End: 2023-09-14

## 2023-09-20 ENCOUNTER — MED REC SCAN ONLY (OUTPATIENT)
Dept: INTERNAL MEDICINE CLINIC | Facility: CLINIC | Age: 83
End: 2023-09-20

## 2023-09-22 ENCOUNTER — TELEPHONE (OUTPATIENT)
Dept: INTERNAL MEDICINE CLINIC | Facility: CLINIC | Age: 83
End: 2023-09-22

## 2023-09-22 NOTE — TELEPHONE ENCOUNTER
Monty Basurto from [facility] supply calling to check status on fax sent for medical supplies.

## 2023-09-25 NOTE — TELEPHONE ENCOUNTER
Negin Kaur at [facility] notified pt does not qualify for freestyle truong, on metformin and A1c=5.9. Hussien coburn.

## 2023-09-25 NOTE — TELEPHONE ENCOUNTER
RN's do not have any requests for pt. Per 9/20 med rec scan encounter, appears order was received and placed in TB bin. TB, have you seen this?

## 2023-09-25 NOTE — TELEPHONE ENCOUNTER
Meggan Burgess from [place], [place] 932-2208 fax# [phone] or fax# [phone] calling to check on the status

## 2023-10-09 ENCOUNTER — TELEPHONE (OUTPATIENT)
Dept: CARDIOLOGY | Age: 83
End: 2023-10-09

## 2023-10-09 ENCOUNTER — ANCILLARY ORDERS (OUTPATIENT)
Dept: CARDIOLOGY | Age: 83
End: 2023-10-09

## 2023-10-09 ENCOUNTER — ANCILLARY PROCEDURE (OUTPATIENT)
Dept: CARDIOLOGY | Age: 83
End: 2023-10-09
Attending: INTERNAL MEDICINE

## 2023-10-09 DIAGNOSIS — Z95.810 ICD (IMPLANTABLE CARDIOVERTER-DEFIBRILLATOR) IN PLACE: ICD-10-CM

## 2023-10-09 PROCEDURE — 93295 DEV INTERROG REMOTE 1/2/MLT: CPT | Performed by: INTERNAL MEDICINE

## 2023-10-13 RX ORDER — EPLERENONE 25 MG/1
12.5 TABLET, FILM COATED ORAL DAILY
Qty: 45 TABLET | Refills: 3 | Status: SHIPPED | OUTPATIENT
Start: 2023-10-13

## 2023-10-30 ENCOUNTER — TELEPHONE (OUTPATIENT)
Dept: PAIN CLINIC | Facility: CLINIC | Age: 83
End: 2023-10-30

## 2023-10-30 DIAGNOSIS — G56.01 CARPAL TUNNEL SYNDROME OF RIGHT WRIST: Primary | ICD-10-CM

## 2023-10-30 NOTE — TELEPHONE ENCOUNTER
Discussed this with Melva Gallego and  ,Injection is too soon,if pt is having pain pt needs to see neurosurgeon.

## 2023-10-30 NOTE — TELEPHONE ENCOUNTER
Patient would like to repeat wrist injection, please advise if f/u is needed or if patient can schedule injection. Patient was reluctant to schedule OV for follow up. Last injection 8/11/23.

## 2023-10-30 NOTE — TELEPHONE ENCOUNTER
Attempted to contact pt pt did not pick the call. Left detailed message to an identified vm. Advised to call if she has questions.

## 2023-10-31 ENCOUNTER — TELEPHONE (OUTPATIENT)
Dept: PAIN CLINIC | Facility: CLINIC | Age: 83
End: 2023-10-31

## 2023-10-31 NOTE — TELEPHONE ENCOUNTER
Deyanira Garcia MD  You19 minutes ago (9:05 AM)       Referral place for right carpal tunnel injection. This is the same as the median nerve block. just CHELITA   Contacted pt at this time and notified that  placed the referral and navigator will be calling her to schedule.

## 2023-10-31 NOTE — TELEPHONE ENCOUNTER
Order Questions    Question Answer   Anesthesia Type Local   Provider [facility]   Location Office w/ Ultrasound   CPT (Hit enter after each entry) INJECTION, THERAPEUTIC (LOCAL ANESTHETIC/CORTICOSTEROID   Medical clearance requested (will send to Pain Navigator) No   Patient has Medicare coverage?  Yes   Comments (Please list entire procedure name here.) right carpal tunnel injection       Scheduled patient for In Office -Right Carpal Tunnel Injection w/Ultrasound on 11/13/23 @ 11:00 AM.

## 2023-10-31 NOTE — TELEPHONE ENCOUNTER
Contacted pt at this time. Pt states that it was her Right hand that needs an injection not the left hand. Advised pt that we will reach out to  and see if  can place the referral for right median nerve block.  Pt Vu

## 2023-11-03 ENCOUNTER — HOSPITAL ENCOUNTER (OUTPATIENT)
Age: 83
Discharge: HOME | End: 2023-11-03
Attending: EMERGENCY MEDICINE
Payer: MEDICARE

## 2023-11-03 ENCOUNTER — TELEPHONE (OUTPATIENT)
Dept: INTERNAL MEDICINE CLINIC | Facility: CLINIC | Age: 83
End: 2023-11-03

## 2023-11-03 VITALS
WEIGHT: 170 LBS | OXYGEN SATURATION: 95 % | BODY MASS INDEX: 31 KG/M2 | TEMPERATURE: 97 F | DIASTOLIC BLOOD PRESSURE: 74 MMHG | SYSTOLIC BLOOD PRESSURE: 131 MMHG | HEART RATE: 60 BPM | RESPIRATION RATE: 18 BRPM

## 2023-11-03 DIAGNOSIS — U07.1 COVID-19: Primary | ICD-10-CM

## 2023-11-03 LAB — SARS-COV-2 RNA RESP QL NAA+PROBE: DETECTED

## 2023-11-03 PROCEDURE — 99213 OFFICE O/P EST LOW 20 MIN: CPT

## 2023-11-03 RX ORDER — BENZONATATE 200 MG/1
200 CAPSULE ORAL 3 TIMES DAILY PRN
Qty: 30 CAPSULE | Refills: 0 | Status: SHIPPED | OUTPATIENT
Start: 2023-11-03 | End: 2023-11-13

## 2023-11-03 NOTE — TELEPHONE ENCOUNTER
Patient calling has lost her voice, had terrible cough at night, producing phlegm, no other symptoms, x 1 wk.  Patient asking what TB would recommend to help with cough.

## 2023-11-03 NOTE — TELEPHONE ENCOUNTER
LOV 7/7/23     Called and spoke w/ pt. Symptoms started last Thursday/Friday, started to not feel good. C/o sore throat, productive cough, runny nose, and lost her voice. Pt thought she would be better by now. Pt does not have any home Covid tests to test herself. Pt wanted to go last week to drug store to get tests, but felt so bad and like she was going to pass out. Pt denies feeling like that now. C/o fatigue and low energy. Advised pt I think she should go to  today for evaluation. Notified they can test her and also prescribe as needed. Asked pt if she feels ok to drive or if she can have someone take her. Pt stated she doesn't have anyone to drive her and she feels ok to drive herself. Pt stated she apt next week with TB, 11/11. Notified we can keep that apt and see how she feels next week. Advised pt to call us Monday with an update on how she is feeling. Pt verbalizes understanding and agreeable to go to  today.     CHELITA TB - Advised to go to

## 2023-11-08 ENCOUNTER — LAB ENCOUNTER (OUTPATIENT)
Dept: LAB | Age: 83
End: 2023-11-08
Attending: INTERNAL MEDICINE
Payer: MEDICARE

## 2023-11-08 ENCOUNTER — LAB SERVICES (OUTPATIENT)
Dept: LAB | Age: 83
End: 2023-11-08

## 2023-11-08 ENCOUNTER — ANCILLARY PROCEDURE (OUTPATIENT)
Dept: CARDIOLOGY | Age: 83
End: 2023-11-08
Attending: INTERNAL MEDICINE

## 2023-11-08 DIAGNOSIS — E11.69 DIABETES MELLITUS TYPE 2 IN OBESE: ICD-10-CM

## 2023-11-08 DIAGNOSIS — I10 PRIMARY HYPERTENSION: ICD-10-CM

## 2023-11-08 DIAGNOSIS — I50.32 CHRONIC HEART FAILURE WITH PRESERVED EJECTION FRACTION (HFPEF) (CMD): ICD-10-CM

## 2023-11-08 DIAGNOSIS — E78.5 DYSLIPIDEMIA: ICD-10-CM

## 2023-11-08 DIAGNOSIS — E66.9 DIABETES MELLITUS TYPE 2 IN OBESE: ICD-10-CM

## 2023-11-08 LAB
ALBUMIN SERPL-MCNC: 3.2 G/DL (ref 3.6–5.1)
ALBUMIN SERPL-MCNC: 3.4 G/DL (ref 3.4–5)
ALBUMIN/GLOB SERPL: 0.8 {RATIO} (ref 1–2.4)
ALBUMIN/GLOB SERPL: 0.9 {RATIO} (ref 1–2)
ALP LIVER SERPL-CCNC: 72 U/L
ALP SERPL-CCNC: 74 UNITS/L (ref 45–117)
ALT SERPL-CCNC: 16 U/L
ALT SERPL-CCNC: 20 UNITS/L
ANION GAP SERPL CALC-SCNC: 3 MMOL/L (ref 0–18)
ANION GAP SERPL CALC-SCNC: 4 MMOL/L (ref 7–19)
AORTIC VALVE AREA (AVA): 0.58
ASCENDING AORTA (AAD): 3
AST SERPL-CCNC: 17 U/L (ref 15–37)
AST SERPL-CCNC: 18 UNITS/L
AV PEAK GRADIENT (AVPG): 6
AV PEAK VELOCITY (AVPV): 1.24
AV STENOSIS SEVERITY TEXT: NORMAL
AVI LVOT PEAK GRADIENT (LVOTMG): 1.2
BASOPHILS # BLD: 0.1 K/MCL (ref 0–0.3)
BASOPHILS NFR BLD: 1 %
BILIRUB SERPL-MCNC: 0.5 MG/DL (ref 0.1–2)
BILIRUB SERPL-MCNC: 0.5 MG/DL (ref 0.2–1)
BUN BLD-MCNC: 15 MG/DL (ref 9–23)
BUN SERPL-MCNC: 14 MG/DL (ref 6–20)
BUN/CREAT SERPL: 16 (ref 7–25)
CALCIUM BLD-MCNC: 9.8 MG/DL (ref 8.5–10.1)
CALCIUM SERPL-MCNC: 9.7 MG/DL (ref 8.4–10.2)
CHLORIDE SERPL-SCNC: 108 MMOL/L (ref 98–112)
CHLORIDE SERPL-SCNC: 109 MMOL/L (ref 97–110)
CO2 SERPL-SCNC: 27 MMOL/L (ref 21–32)
CO2 SERPL-SCNC: 29 MMOL/L (ref 21–32)
CREAT BLD-MCNC: 0.95 MG/DL
CREAT SERPL-MCNC: 0.87 MG/DL (ref 0.51–0.95)
DEPRECATED RDW RBC: 46.3 FL (ref 39–50)
E WAVE DECELARATION TIME (MDT): 17.09
EGFRCR SERPLBLD CKD-EPI 2021: 60 ML/MIN/1.73M2 (ref 60–?)
EGFRCR SERPLBLD CKD-EPI 2021: 67 ML/MIN/{1.73_M2}
EOSINOPHIL # BLD: 0.4 K/MCL (ref 0–0.5)
EOSINOPHIL NFR BLD: 5 %
ERYTHROCYTE [DISTWIDTH] IN BLOOD: 12.9 % (ref 11–15)
EST. AVERAGE GLUCOSE BLD GHB EST-MCNC: 137 MG/DL (ref 68–126)
FASTING DURATION TIME PATIENT: ABNORMAL H
FASTING STATUS PATIENT QL REPORTED: YES
GLOBULIN PLAS-MCNC: 3.6 G/DL (ref 2.8–4.4)
GLOBULIN SER-MCNC: 3.9 G/DL (ref 2–4)
GLUCOSE BLD-MCNC: 120 MG/DL (ref 70–99)
GLUCOSE SERPL-MCNC: 130 MG/DL (ref 70–99)
HBA1C MFR BLD: 6.4 % (ref ?–5.7)
HCT VFR BLD CALC: 41.4 % (ref 36–46.5)
HGB BLD-MCNC: 13.7 G/DL (ref 12–15.5)
IMM GRANULOCYTES # BLD AUTO: 0.1 K/MCL (ref 0–0.2)
IMM GRANULOCYTES # BLD: 1 %
INTERVENTRICULAR SEPTUM IN END DIASTOLE (IVSD): 2.04
LEFT INTERNAL DIMENSION IN SYSTOLE (LVSD): 1.6
LEFT VENTRICULAR INTERNAL DIMENSION IN DIASTOLE (LVDD): 2.8
LEFT VENTRICULAR POSTERIOR WALL IN END DIASTOLE (LVPW): 3.8
LV EF: NORMAL %
LVOT VTI (LVOTVTI): 0.74
LYMPHOCYTES # BLD: 2.5 K/MCL (ref 1–4)
LYMPHOCYTES NFR BLD: 28 %
MCH RBC QN AUTO: 32.3 PG (ref 26–34)
MCHC RBC AUTO-ENTMCNC: 33.1 G/DL (ref 32–36.5)
MCV RBC AUTO: 97.6 FL (ref 78–100)
MONOCYTES # BLD: 0.6 K/MCL (ref 0.3–0.9)
MONOCYTES NFR BLD: 7 %
MV E TISSUE VEL MED (MESV): 4.7
MV E WAVE VEL/E TISSUE VEL MED(MSR): 3.4
MV PEAK A VELOCITY (MVPAV): 287
MV PEAK E VELOCITY (MVPEV): 0.96
NEUTROPHILS # BLD: 5.1 K/MCL (ref 1.8–7.7)
NEUTROPHILS NFR BLD: 58 %
NRBC BLD MANUAL-RTO: 0 /100 WBC
NT-PROBNP SERPL-MCNC: 736 PG/ML
OSMOLALITY SERPL CALC.SUM OF ELEC: 288 MOSM/KG (ref 275–295)
PLATELET # BLD AUTO: 344 K/MCL (ref 140–450)
POTASSIUM SERPL-SCNC: 4.5 MMOL/L (ref 3.5–5.1)
POTASSIUM SERPL-SCNC: 4.8 MMOL/L (ref 3.4–5.1)
PROT SERPL-MCNC: 7 G/DL (ref 6.4–8.2)
PROT SERPL-MCNC: 7.1 G/DL (ref 6.4–8.2)
RBC # BLD: 4.24 MIL/MCL (ref 4–5.2)
RV END SYSTOLIC LONGITUDINAL STRAIN FREE WALL (RVGS): 2.2
SODIUM SERPL-SCNC: 137 MMOL/L (ref 135–145)
SODIUM SERPL-SCNC: 138 MMOL/L (ref 136–145)
TRICUSPID VALVE ANNULAR PEAK VELOCITY (TVAPV): 28
TRICUSPID VALVE PEAK REGURGITATION VELOCITY (TRPV): 3.2
TSH SERPL-ACNC: 0.73 MCUNITS/ML (ref 0.35–5)
TV ESTIMATED RIGHT ARTERIAL PRESSURE (RAP): 10.3
WBC # BLD: 8.8 K/MCL (ref 4.2–11)

## 2023-11-08 PROCEDURE — 36415 COLL VENOUS BLD VENIPUNCTURE: CPT

## 2023-11-08 PROCEDURE — 76376 3D RENDER W/INTRP POSTPROCES: CPT | Performed by: INTERNAL MEDICINE

## 2023-11-08 PROCEDURE — 83036 HEMOGLOBIN GLYCOSYLATED A1C: CPT

## 2023-11-08 PROCEDURE — 80053 COMPREHEN METABOLIC PANEL: CPT

## 2023-11-08 PROCEDURE — 36415 COLL VENOUS BLD VENIPUNCTURE: CPT | Performed by: CLINICAL MEDICAL LABORATORY

## 2023-11-08 PROCEDURE — 83880 ASSAY OF NATRIURETIC PEPTIDE: CPT | Performed by: CLINICAL MEDICAL LABORATORY

## 2023-11-08 PROCEDURE — 85025 COMPLETE CBC W/AUTO DIFF WBC: CPT | Performed by: CLINICAL MEDICAL LABORATORY

## 2023-11-08 PROCEDURE — 93306 TTE W/DOPPLER COMPLETE: CPT | Performed by: INTERNAL MEDICINE

## 2023-11-08 PROCEDURE — 84443 ASSAY THYROID STIM HORMONE: CPT | Performed by: CLINICAL MEDICAL LABORATORY

## 2023-11-08 PROCEDURE — 80053 COMPREHEN METABOLIC PANEL: CPT | Performed by: CLINICAL MEDICAL LABORATORY

## 2023-11-11 ENCOUNTER — TELEPHONE (OUTPATIENT)
Dept: INTERNAL MEDICINE CLINIC | Facility: CLINIC | Age: 83
End: 2023-11-11

## 2023-11-11 ENCOUNTER — OFFICE VISIT (OUTPATIENT)
Dept: INTERNAL MEDICINE CLINIC | Facility: CLINIC | Age: 83
End: 2023-11-11
Payer: MEDICARE

## 2023-11-11 VITALS
OXYGEN SATURATION: 94 % | HEIGHT: 61.81 IN | TEMPERATURE: 97 F | BODY MASS INDEX: 32.17 KG/M2 | WEIGHT: 174.81 LBS | DIASTOLIC BLOOD PRESSURE: 76 MMHG | RESPIRATION RATE: 18 BRPM | SYSTOLIC BLOOD PRESSURE: 128 MMHG | HEART RATE: 56 BPM

## 2023-11-11 DIAGNOSIS — H54.7 VISION DECREASED: ICD-10-CM

## 2023-11-11 DIAGNOSIS — E78.5 DYSLIPIDEMIA: ICD-10-CM

## 2023-11-11 DIAGNOSIS — E66.9 DIABETES MELLITUS TYPE 2 IN OBESE: ICD-10-CM

## 2023-11-11 DIAGNOSIS — F33.9 DEPRESSION, RECURRENT (HCC): ICD-10-CM

## 2023-11-11 DIAGNOSIS — G56.03 BILATERAL CARPAL TUNNEL SYNDROME: ICD-10-CM

## 2023-11-11 DIAGNOSIS — E11.69 DIABETES MELLITUS TYPE 2 IN OBESE: ICD-10-CM

## 2023-11-11 DIAGNOSIS — U07.1 COVID-19 VIRUS INFECTION: Primary | ICD-10-CM

## 2023-11-11 PROCEDURE — 99214 OFFICE O/P EST MOD 30 MIN: CPT | Performed by: INTERNAL MEDICINE

## 2023-11-11 RX ORDER — BLOOD-GLUCOSE METER
1 EACH MISCELLANEOUS DAILY
Qty: 100 STRIP | Refills: 1 | Status: SHIPPED | OUTPATIENT
Start: 2023-11-11

## 2023-11-11 RX ORDER — LANCETS 33 GAUGE
1 EACH MISCELLANEOUS
Qty: 100 EACH | Refills: 1 | Status: SHIPPED | OUTPATIENT
Start: 2023-11-11 | End: 2024-11-10

## 2023-11-11 NOTE — TELEPHONE ENCOUNTER
Informed must fast no call back required. Orders to [facility].    Future Appointments   Date Time Provider Department Center   11/13/2023 11:00 AM [facility]IN [facility] [place]   11/13/2023 11:15 AM Ranjit Kim MD [facility] [place]   3/12/2024 11:40 AM Kat Banerjee MD [facility]

## 2023-11-13 ENCOUNTER — NURSE ONLY (OUTPATIENT)
Dept: PAIN CLINIC | Facility: CLINIC | Age: 83
End: 2023-11-13
Payer: MEDICARE

## 2023-11-13 ENCOUNTER — OFFICE VISIT (OUTPATIENT)
Dept: PAIN CLINIC | Facility: CLINIC | Age: 83
End: 2023-11-13
Payer: MEDICARE

## 2023-11-13 VITALS
BODY MASS INDEX: 32 KG/M2 | WEIGHT: 174 LBS | HEART RATE: 64 BPM | OXYGEN SATURATION: 97 % | SYSTOLIC BLOOD PRESSURE: 112 MMHG | DIASTOLIC BLOOD PRESSURE: 68 MMHG

## 2023-11-13 DIAGNOSIS — M54.12 CERVICAL RADICULOPATHY: ICD-10-CM

## 2023-11-13 DIAGNOSIS — G56.01 CARPAL TUNNEL SYNDROME OF RIGHT WRIST: Primary | ICD-10-CM

## 2023-11-13 RX ORDER — BUPIVACAINE HYDROCHLORIDE 5 MG/ML
1 INJECTION, SOLUTION EPIDURAL; INTRACAUDAL ONCE
Status: COMPLETED | OUTPATIENT
Start: 2023-11-13 | End: 2023-11-13

## 2023-11-13 RX ORDER — LIDOCAINE HYDROCHLORIDE 10 MG/ML
5 INJECTION, SOLUTION INFILTRATION; PERINEURAL ONCE
Status: COMPLETED | OUTPATIENT
Start: 2023-11-13 | End: 2023-11-13

## 2023-11-13 RX ORDER — TRIAMCINOLONE ACETONIDE 40 MG/ML
40 INJECTION, SUSPENSION INTRA-ARTICULAR; INTRAMUSCULAR ONCE
Status: COMPLETED | OUTPATIENT
Start: 2023-11-13 | End: 2023-11-13

## 2023-11-13 NOTE — PROGRESS NOTES
Patient presents in office today with reported pain in right side CTS    Current pain level reported = 0-7/10    Last reported dose of tylenol PM last night      Narcotic Contract renewal none    Urine Drug screen none

## 2023-11-13 NOTE — PROCEDURES
Date of procedure: November 13, 2023    Preop diagnosis: Carpal tunnel syndrome    Postop Diagnosis: Same    Procedure: Right ultrasound-guided carpal tunnel injection    Surgeon: Clarke Elliott    Anesthesia: Lidocaine    EBL: 0    Indication: Patient is a 82-year-old female with history of carpal tunnel syndrome    Procedure detail: Informed consent was obtained. Timeout was done. The skin overlying the right wrist was prepped in the usual sterile fashion. Subsequently imaging was used to identify the contents of the carpal tunnel. The overlying soft tissue was anesthetized with 1 cc of 1% lidocaine. A 27-gauge needle was advanced with ultrasound imaging into the carpal tunnel. After negative aspiration for hematoma mixture of 20 mg of triamcinolone with 0.5 cc of 1% lidocaine was then injected. Needle was then withdrawn tip intact. Patient tolerated procedure well.     Clarke Elliott MD

## 2023-11-13 NOTE — PROGRESS NOTES
Patient here for carpal tunnel injection. However, patient also complains of right-sided upper extremity radicular symptoms. Had cervical MRI done in 2019 which showed severe left-sided foraminal stenosis. Patient interested in further work-up of this. Had detailed discussion with patient regarding cervical radiculopathy. Given patient has had a pacemaker and defibrillator placed since 2019 MRI, did order CT of the cervical spine. Patient will follow-up after CT is completed to discuss if cervical epidural steroid injection is a viable treatment option.

## 2023-11-13 NOTE — PROGRESS NOTES
Timeout completed prior to procedure @ 8123. Participants present for timeout:  Dr. Bull Ramos, and patient.

## 2023-11-15 ENCOUNTER — OFFICE VISIT (OUTPATIENT)
Dept: CARDIOLOGY | Age: 83
End: 2023-11-15

## 2023-11-15 VITALS
HEIGHT: 63 IN | DIASTOLIC BLOOD PRESSURE: 74 MMHG | BODY MASS INDEX: 30.82 KG/M2 | SYSTOLIC BLOOD PRESSURE: 120 MMHG | WEIGHT: 173.94 LBS | HEART RATE: 73 BPM | RESPIRATION RATE: 18 BRPM

## 2023-11-15 DIAGNOSIS — I50.32 CHRONIC HEART FAILURE WITH PRESERVED EJECTION FRACTION (HFPEF) (CMD): Primary | ICD-10-CM

## 2023-11-15 DIAGNOSIS — E78.5 DYSLIPIDEMIA: ICD-10-CM

## 2023-11-15 DIAGNOSIS — I65.23 ASYMPTOMATIC CAROTID ARTERY STENOSIS, BILATERAL: ICD-10-CM

## 2023-11-15 DIAGNOSIS — E55.9 VITAMIN D DEFICIENCY: ICD-10-CM

## 2023-11-15 PROCEDURE — 99215 OFFICE O/P EST HI 40 MIN: CPT | Performed by: INTERNAL MEDICINE

## 2023-11-15 RX ORDER — FUROSEMIDE 20 MG/1
20 TABLET ORAL DAILY PRN
Qty: 10 TABLET | Refills: 0 | Status: SHIPPED | OUTPATIENT
Start: 2023-11-15

## 2023-11-15 SDOH — HEALTH STABILITY: PHYSICAL HEALTH: ON AVERAGE, HOW MANY MINUTES DO YOU ENGAGE IN EXERCISE AT THIS LEVEL?: 0 MIN

## 2023-11-15 SDOH — HEALTH STABILITY: PHYSICAL HEALTH: ON AVERAGE, HOW MANY DAYS PER WEEK DO YOU ENGAGE IN MODERATE TO STRENUOUS EXERCISE (LIKE A BRISK WALK)?: 0 DAYS

## 2023-11-15 ASSESSMENT — ENCOUNTER SYMPTOMS
WEIGHT LOSS: 0
BLOATING: 0
BACK PAIN: 1
SLEEP DISTURBANCES DUE TO BREATHING: 0
ABDOMINAL PAIN: 0
SUSPICIOUS LESIONS: 0
WEIGHT GAIN: 1
HEMOPTYSIS: 0
ALLERGIC/IMMUNOLOGIC COMMENTS: NO NEW FOOD ALLERGIES
DIARRHEA: 0
SHORTNESS OF BREATH: 0
CONSTIPATION: 0
DIZZINESS: 1
BRUISES/BLEEDS EASILY: 0
COUGH: 0
DEPRESSION: 0
HEMATOCHEZIA: 0
INSOMNIA: 0
FEVER: 0
LIGHT-HEADEDNESS: 1
HEADACHES: 0
CHILLS: 0

## 2023-11-15 ASSESSMENT — PATIENT HEALTH QUESTIONNAIRE - PHQ9
CLINICAL INTERPRETATION OF PHQ2 SCORE: NO FURTHER SCREENING NEEDED
SUM OF ALL RESPONSES TO PHQ9 QUESTIONS 1 AND 2: 0
SUM OF ALL RESPONSES TO PHQ9 QUESTIONS 1 AND 2: 0
1. LITTLE INTEREST OR PLEASURE IN DOING THINGS: NOT AT ALL
2. FEELING DOWN, DEPRESSED OR HOPELESS: NOT AT ALL

## 2023-11-16 ENCOUNTER — TELEPHONE (OUTPATIENT)
Dept: CARDIOLOGY | Age: 83
End: 2023-11-16

## 2023-11-17 ENCOUNTER — TELEPHONE (OUTPATIENT)
Dept: CARDIOLOGY | Age: 83
End: 2023-11-17

## 2023-11-20 ENCOUNTER — TELEPHONE (OUTPATIENT)
Dept: INTERNAL MEDICINE CLINIC | Facility: CLINIC | Age: 83
End: 2023-11-20

## 2023-11-20 NOTE — TELEPHONE ENCOUNTER
Patient calling was seen on 11/11/23 with TB and had Covid and TB offered Paxlovid but pt declined.  Patient is still not feeling well and is asking if TB can send in Paxlovid.    [id number] - [place], [place] - [address] AT [facility] [address], [phone], [phone] [[id number]]

## 2023-11-20 NOTE — TELEPHONE ENCOUNTER
Runny nose, cough, phlegm ongoing with nothing otc taken. She thought it was concerning symptoms are still on going.     Denies fever, cp, body aches, sob.notified outside window for paxlovid. Advised warm fluids, avoid honey due to DM. She has DM friendly honey cough drops, discussed benzonatate from [facility]. Advised if symptoms worsen to call for evaluation, [facility] or ED for severe symptoms. She is aware cough can linger but concerns with new s/sx. She is doing ok and states she will call if needed.

## 2023-11-29 ENCOUNTER — HOSPITAL ENCOUNTER (OUTPATIENT)
Dept: CT IMAGING | Age: 83
Discharge: HOME | End: 2023-11-29
Attending: ANESTHESIOLOGY
Payer: MEDICARE

## 2023-11-29 DIAGNOSIS — M54.12 CERVICAL RADICULOPATHY: ICD-10-CM

## 2023-11-29 PROCEDURE — 72125 CT NECK SPINE W/O DYE: CPT | Performed by: ANESTHESIOLOGY

## 2023-12-04 ENCOUNTER — TELEPHONE (OUTPATIENT)
Dept: CARDIOLOGY | Age: 83
End: 2023-12-04

## 2023-12-04 NOTE — TELEPHONE ENCOUNTER
Medication Detail    Medication Quantity Refills Start End   OneTouch Delica Lancets 75P Does not apply Misc 100 each 1 2023 11/10/2024   Si Lancet by Finger stick route daily as needed.      Route:   Finger stick     Order #:   [id number]       Calling to obtain icd 9 or 10 codes as well as wanting to know if pt is on insulin

## 2023-12-05 ENCOUNTER — TELEPHONE (OUTPATIENT)
Dept: PAIN CLINIC | Facility: CLINIC | Age: 83
End: 2023-12-05

## 2023-12-05 NOTE — TELEPHONE ENCOUNTER
Spoke to pt and relayed the results to pt and the recommendation. Pt vu and states that if she proceed with the injection with sedation  will she  need a ? Advised pt that  she definitely will need a ,pt states that 's the problem she has no one to  her. Pt states that her neck really does not bother her right now, what's bothering her is her back and she's wanting to get injection for her back. Advised pt that she needs to schedule a follow up visit to discuss this with Collins Tucker . Pt vu and states that she'll think about it and she'll give us a call if decided what she wants to do . Advised pt to just let us know if she wants to proceed with DOUGLAS or wants to schedule or follow up with Collins Tucker to discuss about her back pain.  Pt Vu and no further questions at this time

## 2023-12-05 NOTE — TELEPHONE ENCOUNTER
Patient would like to discuss her CT scan results with Dr. Wesley Martinez. Endorsed to [facility].

## 2023-12-05 NOTE — TELEPHONE ENCOUNTER
Lynda Valente MD  You3 minutes ago (2:25 PM)   Attempted to contact  pt at this time,pt did not  the call. Left vm requesting a callback to relay the results and recommendation. Patient with imaging findings of cervical degenerative disc disease and facet arthropathy leading to foraminal stenosis. Please see below. Given she has upper extremity radicular symptoms, recommend cervical epidural steroid injection. C5-C6:  Disc osteophyte complex abuts the ventral thecal sac without significant central canal stenosis. Facet and uncovertebral hypertrophy contribute to moderate right-sided and severe left-sided foraminal stenosis. C6-C7:  Disc osteophyte complex abuts the ventral thecal sac resulting in mild central canal stenosis. Facet and uncovertebral hypertrophy contribute to moderate bilateral foraminal stenosis.

## 2023-12-07 RX ORDER — LANCETS 33 GAUGE
1 EACH MISCELLANEOUS
Qty: 100 EACH | Refills: 1 | OUTPATIENT
Start: 2023-12-07 | End: 2024-12-06

## 2023-12-11 ENCOUNTER — TELEPHONE (OUTPATIENT)
Dept: ENDOCRINOLOGY CLINIC | Facility: CLINIC | Age: 83
End: 2023-12-11

## 2023-12-11 NOTE — TELEPHONE ENCOUNTER
Pt lvm on perfevt serv she needs refill on metformen Dr Zayda Campos office told her she should get it from us but I do not see why

## 2023-12-12 NOTE — TELEPHONE ENCOUNTER
6 months supply sent on 11/2023 so she should not need refills.  Please check  Agree she does not go to DM clinic, metformin prescribed by me

## 2023-12-28 NOTE — TELEPHONE ENCOUNTER
Requested Prescriptions     Pending Prescriptions Disp Refills    LEVOTHYROXINE 100 MCG Oral Tab [Pharmacy Med Name: LEVOTHYROXINE 0.100MG (100MCG) TAB] 90 tablet 0     Sig: TAKE 1 TABLET BY MOUTH EVERY MORNING BEFORE BREAKFAST    DULOXETINE 60 MG Oral Cap DR Particles [Pharmacy Med Name: DULOXETINE DR 60MG CAPSULES] 90 capsule 0     Sig: TAKE 1 CAPSULE(60 MG) BY MOUTH EVERY DAY       LOV: 11--covid-tb    LAST CPE: 3--TB-Physical    Last Labs:  11-8-2023-cmp,a1c    Last Refill:   Levothyroxine-- 7---90 tabs with 1 refills     Duloxetine--  8-7-2023-- 90 caps with 1 refills     Your appointments       Date & Time Appointment Department [facility])    Mar 12, 2024 11:40 AM CDT Medicare Annual Well Visit with MD Susanne Loco, [address] [place] [address] ([facility] [place])              [facility]  [facility] [place]  [address] [phone]

## 2023-12-29 RX ORDER — LEVOTHYROXINE SODIUM 0.1 MG/1
TABLET ORAL
Qty: 90 TABLET | Refills: 3 | Status: SHIPPED | OUTPATIENT
Start: 2023-12-29

## 2023-12-29 RX ORDER — DULOXETIN HYDROCHLORIDE 60 MG/1
CAPSULE, DELAYED RELEASE ORAL
Qty: 90 CAPSULE | Refills: 1 | Status: SHIPPED | OUTPATIENT
Start: 2023-12-29

## 2024-01-21 ENCOUNTER — EXTERNAL RECORD (OUTPATIENT)
Dept: HEALTH INFORMATION MANAGEMENT | Facility: OTHER | Age: 84
End: 2024-01-21

## 2024-01-23 ENCOUNTER — ANCILLARY PROCEDURE (OUTPATIENT)
Dept: CARDIOLOGY | Age: 84
End: 2024-01-23
Attending: INTERNAL MEDICINE

## 2024-01-23 ENCOUNTER — ANCILLARY ORDERS (OUTPATIENT)
Dept: CARDIOLOGY | Age: 84
End: 2024-01-23

## 2024-01-23 DIAGNOSIS — Z95.810 ICD (IMPLANTABLE CARDIOVERTER-DEFIBRILLATOR) IN PLACE: Primary | ICD-10-CM

## 2024-01-23 DIAGNOSIS — Z95.810 ICD (IMPLANTABLE CARDIOVERTER-DEFIBRILLATOR) IN PLACE: ICD-10-CM

## 2024-01-23 PROCEDURE — 93295 DEV INTERROG REMOTE 1/2/MLT: CPT | Performed by: INTERNAL MEDICINE

## 2024-01-23 PROCEDURE — 93296 REM INTERROG EVL PM/IDS: CPT | Performed by: INTERNAL MEDICINE

## 2024-02-14 ENCOUNTER — APPOINTMENT (OUTPATIENT)
Dept: CARDIOLOGY | Age: 84
End: 2024-02-14
Attending: INTERNAL MEDICINE

## 2024-02-14 ENCOUNTER — OFFICE VISIT (OUTPATIENT)
Dept: CARDIOLOGY | Age: 84
End: 2024-02-14

## 2024-02-14 VITALS
BODY MASS INDEX: 29.79 KG/M2 | WEIGHT: 168.1 LBS | HEART RATE: 68 BPM | HEIGHT: 63 IN | HEART RATE: 64 BPM | DIASTOLIC BLOOD PRESSURE: 78 MMHG | OXYGEN SATURATION: 97 % | SYSTOLIC BLOOD PRESSURE: 124 MMHG

## 2024-02-14 DIAGNOSIS — Z95.810 ICD (IMPLANTABLE CARDIOVERTER-DEFIBRILLATOR) IN PLACE: ICD-10-CM

## 2024-02-14 DIAGNOSIS — Z95.810 ICD (IMPLANTABLE CARDIOVERTER-DEFIBRILLATOR) IN PLACE: Primary | ICD-10-CM

## 2024-02-14 LAB
MDC_IDC_LEAD_IMPLANT_DT: NORMAL
MDC_IDC_LEAD_IMPLANT_DT: NORMAL
MDC_IDC_LEAD_LOCATION: NORMAL
MDC_IDC_LEAD_LOCATION: NORMAL
MDC_IDC_LEAD_LOCATION_DETAIL_1: NORMAL
MDC_IDC_LEAD_LOCATION_DETAIL_1: NORMAL
MDC_IDC_LEAD_MFG: NORMAL
MDC_IDC_LEAD_MFG: NORMAL
MDC_IDC_LEAD_MODEL: NORMAL
MDC_IDC_LEAD_MODEL: NORMAL
MDC_IDC_LEAD_SERIAL: NORMAL
MDC_IDC_LEAD_SERIAL: NORMAL
MDC_IDC_MSMT_BATTERY_DTM: NORMAL
MDC_IDC_MSMT_BATTERY_REMAINING_LONGEVITY: 40 MO
MDC_IDC_MSMT_CAP_CHARGE_TYPE: NORMAL
MDC_IDC_MSMT_LEADCHNL_RA_IMPEDANCE_VALUE: 430 OHM
MDC_IDC_MSMT_LEADCHNL_RA_PACING_THRESHOLD_AMPLITUDE: 0.75 V
MDC_IDC_MSMT_LEADCHNL_RA_PACING_THRESHOLD_PULSEWIDTH: 0.5 MS
MDC_IDC_MSMT_LEADCHNL_RA_SENSING_INTR_AMPL: 3.1 MV
MDC_IDC_MSMT_LEADCHNL_RV_IMPEDANCE_VALUE: 400 OHM
MDC_IDC_MSMT_LEADCHNL_RV_PACING_THRESHOLD_AMPLITUDE: 1.25 V
MDC_IDC_MSMT_LEADCHNL_RV_PACING_THRESHOLD_PULSEWIDTH: 0.5 MS
MDC_IDC_MSMT_LEADCHNL_RV_SENSING_INTR_AMPL: 9.1 MV
MDC_IDC_PG_IMPLANT_DTM: NORMAL
MDC_IDC_PG_MFG: NORMAL
MDC_IDC_PG_MODEL: NORMAL
MDC_IDC_PG_SERIAL: NORMAL
MDC_IDC_PG_TYPE: NORMAL
MDC_IDC_SESS_CLINIC_NAME: NORMAL
MDC_IDC_SESS_TYPE: NORMAL
MDC_IDC_SET_BRADY_AT_MODE_SWITCH_MODE: NORMAL
MDC_IDC_SET_BRADY_AT_MODE_SWITCH_RATE: 180 {BEATS}/MIN
MDC_IDC_SET_BRADY_HYSTRATE: NORMAL
MDC_IDC_SET_BRADY_LOWRATE: 60 {BEATS}/MIN
MDC_IDC_SET_BRADY_MAX_SENSOR_RATE: 110 {BEATS}/MIN
MDC_IDC_SET_BRADY_MAX_TRACKING_RATE: 120 {BEATS}/MIN
MDC_IDC_SET_BRADY_MODE: NORMAL
MDC_IDC_SET_BRADY_NIGHT_RATE: NORMAL
MDC_IDC_SET_BRADY_PAV_DELAY_LOW: 250 MS
MDC_IDC_SET_BRADY_SAV_DELAY_LOW: 250 MS
MDC_IDC_SET_LEADCHNL_RA_PACING_AMPLITUDE: 2 V
MDC_IDC_SET_LEADCHNL_RA_PACING_CAPTURE_MODE: NORMAL
MDC_IDC_SET_LEADCHNL_RA_PACING_POLARITY: NORMAL
MDC_IDC_SET_LEADCHNL_RA_PACING_PULSEWIDTH: 0.5 MS
MDC_IDC_SET_LEADCHNL_RA_SENSING_ADAPTATION_MODE: NORMAL
MDC_IDC_SET_LEADCHNL_RA_SENSING_POLARITY: NORMAL
MDC_IDC_SET_LEADCHNL_RA_SENSING_SENSITIVITY: 0.3 MV
MDC_IDC_SET_LEADCHNL_RV_PACING_AMPLITUDE: 2.5 V
MDC_IDC_SET_LEADCHNL_RV_PACING_CAPTURE_MODE: NORMAL
MDC_IDC_SET_LEADCHNL_RV_PACING_POLARITY: NORMAL
MDC_IDC_SET_LEADCHNL_RV_PACING_PULSEWIDTH: 0.5 MS
MDC_IDC_SET_LEADCHNL_RV_SENSING_POLARITY: NORMAL
MDC_IDC_SET_LEADCHNL_RV_SENSING_SENSITIVITY: 0.5 MV
MDC_IDC_SET_ZONE_DETECTION_INTERVAL: 300 MS
MDC_IDC_SET_ZONE_TYPE: NORMAL
MDC_IDC_SET_ZONE_VENDOR_TYPE: NORMAL
MDC_IDC_STAT_BRADY_RA_PERCENT_PACED: 29 %
MDC_IDC_STAT_BRADY_RV_PERCENT_PACED: 1 %

## 2024-02-14 PROCEDURE — 93283 PRGRMG EVAL IMPLANTABLE DFB: CPT | Performed by: INTERNAL MEDICINE

## 2024-03-11 ENCOUNTER — LAB ENCOUNTER (OUTPATIENT)
Dept: LAB | Age: 84
End: 2024-03-11
Attending: INTERNAL MEDICINE
Payer: MEDICARE

## 2024-03-11 DIAGNOSIS — E11.69 TYPE 2 DIABETES MELLITUS WITH OBESITY (HCC): ICD-10-CM

## 2024-03-11 DIAGNOSIS — E66.9 TYPE 2 DIABETES MELLITUS WITH OBESITY (HCC): ICD-10-CM

## 2024-03-11 DIAGNOSIS — E78.5 DYSLIPIDEMIA: ICD-10-CM

## 2024-03-11 LAB
ALBUMIN SERPL-MCNC: 3.2 G/DL (ref 3.4–5)
ALBUMIN/GLOB SERPL: 0.9 {RATIO} (ref 1–2)
ALP LIVER SERPL-CCNC: 79 U/L
ALT SERPL-CCNC: 17 U/L
ANION GAP SERPL CALC-SCNC: 7 MMOL/L (ref 0–18)
AST SERPL-CCNC: 22 U/L (ref 15–37)
BILIRUB SERPL-MCNC: 0.4 MG/DL (ref 0.1–2)
BUN BLD-MCNC: 22 MG/DL (ref 9–23)
CALCIUM BLD-MCNC: 9.5 MG/DL (ref 8.5–10.1)
CHLORIDE SERPL-SCNC: 108 MMOL/L (ref 98–112)
CHOLEST SERPL-MCNC: 135 MG/DL (ref ?–200)
CO2 SERPL-SCNC: 26 MMOL/L (ref 21–32)
CREAT BLD-MCNC: 1.03 MG/DL
CREAT UR-SCNC: 113 MG/DL
EGFRCR SERPLBLD CKD-EPI 2021: 55 ML/MIN/1.73M2 (ref 60–?)
EST. AVERAGE GLUCOSE BLD GHB EST-MCNC: 131 MG/DL (ref 68–126)
FASTING PATIENT LIPID ANSWER: YES
FASTING STATUS PATIENT QL REPORTED: YES
GLOBULIN PLAS-MCNC: 3.7 G/DL (ref 2.8–4.4)
GLUCOSE BLD-MCNC: 109 MG/DL (ref 70–99)
HBA1C MFR BLD: 6.2 % (ref ?–5.7)
HDLC SERPL-MCNC: 60 MG/DL (ref 40–59)
LDLC SERPL CALC-MCNC: 58 MG/DL (ref ?–100)
MICROALBUMIN UR-MCNC: 1.3 MG/DL
MICROALBUMIN/CREAT 24H UR-RTO: 11.5 UG/MG (ref ?–30)
NONHDLC SERPL-MCNC: 75 MG/DL (ref ?–130)
OSMOLALITY SERPL CALC.SUM OF ELEC: 296 MOSM/KG (ref 275–295)
POTASSIUM SERPL-SCNC: 4.9 MMOL/L (ref 3.5–5.1)
PROT SERPL-MCNC: 6.9 G/DL (ref 6.4–8.2)
SODIUM SERPL-SCNC: 141 MMOL/L (ref 136–145)
TRIGL SERPL-MCNC: 91 MG/DL (ref 30–149)
TSI SER-ACNC: 0.36 MIU/ML (ref 0.36–3.74)
VLDLC SERPL CALC-MCNC: 13 MG/DL (ref 0–30)

## 2024-03-11 PROCEDURE — 36415 COLL VENOUS BLD VENIPUNCTURE: CPT

## 2024-03-11 PROCEDURE — 80061 LIPID PANEL: CPT

## 2024-03-11 PROCEDURE — 82043 UR ALBUMIN QUANTITATIVE: CPT

## 2024-03-11 PROCEDURE — 80053 COMPREHEN METABOLIC PANEL: CPT

## 2024-03-11 PROCEDURE — 82570 ASSAY OF URINE CREATININE: CPT

## 2024-03-11 PROCEDURE — 84443 ASSAY THYROID STIM HORMONE: CPT

## 2024-03-11 PROCEDURE — 83036 HEMOGLOBIN GLYCOSYLATED A1C: CPT

## 2024-03-12 ENCOUNTER — OFFICE VISIT (OUTPATIENT)
Dept: INTERNAL MEDICINE CLINIC | Facility: CLINIC | Age: 84
End: 2024-03-12
Payer: MEDICARE

## 2024-03-12 VITALS
DIASTOLIC BLOOD PRESSURE: 66 MMHG | WEIGHT: 166.38 LBS | HEIGHT: 62 IN | SYSTOLIC BLOOD PRESSURE: 118 MMHG | HEART RATE: 74 BPM | RESPIRATION RATE: 16 BRPM | OXYGEN SATURATION: 97 % | BODY MASS INDEX: 30.62 KG/M2

## 2024-03-12 DIAGNOSIS — E03.8 OTHER SPECIFIED HYPOTHYROIDISM: ICD-10-CM

## 2024-03-12 DIAGNOSIS — N18.31 STAGE 3A CHRONIC KIDNEY DISEASE (HCC): Chronic | ICD-10-CM

## 2024-03-12 DIAGNOSIS — I47.20 VENTRICULAR TACHYCARDIA (HCC): ICD-10-CM

## 2024-03-12 DIAGNOSIS — E11.69 TYPE 2 DIABETES MELLITUS WITH OBESITY (HCC): ICD-10-CM

## 2024-03-12 DIAGNOSIS — I42.2 HYPERTROPHIC CARDIOMYOPATHY (HCC): ICD-10-CM

## 2024-03-12 DIAGNOSIS — E53.8 B12 DEFICIENCY: ICD-10-CM

## 2024-03-12 DIAGNOSIS — I10 PRIMARY HYPERTENSION: ICD-10-CM

## 2024-03-12 DIAGNOSIS — Z00.00 ENCOUNTER FOR WELLNESS EXAMINATION: Primary | ICD-10-CM

## 2024-03-12 DIAGNOSIS — E55.9 VITAMIN D DEFICIENCY: ICD-10-CM

## 2024-03-12 DIAGNOSIS — F33.9 DEPRESSION, RECURRENT (HCC): ICD-10-CM

## 2024-03-12 DIAGNOSIS — E66.9 TYPE 2 DIABETES MELLITUS WITH OBESITY (HCC): ICD-10-CM

## 2024-03-12 DIAGNOSIS — I65.23 BILATERAL CAROTID ARTERY STENOSIS: ICD-10-CM

## 2024-03-12 DIAGNOSIS — I50.32 CHRONIC HEART FAILURE WITH PRESERVED EJECTION FRACTION (HFPEF) (HCC): ICD-10-CM

## 2024-03-12 DIAGNOSIS — Z78.0 POST-MENOPAUSAL: ICD-10-CM

## 2024-03-12 DIAGNOSIS — E78.5 DYSLIPIDEMIA: ICD-10-CM

## 2024-03-12 PROBLEM — N18.30 CKD (CHRONIC KIDNEY DISEASE) STAGE 3, GFR 30-59 ML/MIN (HCC): Chronic | Status: ACTIVE | Noted: 2024-03-12

## 2024-03-12 NOTE — PROGRESS NOTES
Subjective:   Sofiya Martell is a 83 year old female who presents for a MA (Medicare Advantage) Supervisit (Once per calendar year) and scheduled follow up of multiple significant but stable problems.    1. Encounter for wellness examination (Primary)- patient referred for dexa scan, declined mammogram this year. She does not need screening colonoscopy at age 83  2. Post-menopausal- check dexa, discussed adequate ca and vit d intake  -     XR DEXA BONE DENSITOMETRY (CPT=77080); Future; Expected date: 03/12/2024  3. Type 2 diabetes mellitus with obesity (HCC)- controlled, hgba1c 6.2. no side effects with metformin. No foot or vision complaints  4. Stage 3a chronic kidney disease (HCC)- stable, continue to monitor  5. Hypertrophic cardiomyopathy (HCC)- no cardiac complaints, she follows with Dr. Blankenship  6. Depression, recurrent (HCC)- taking duloxetine, will restart therapy with stress of moving  7. Primary hypertension- controlled, no HA/DZ  8. Ventricular tachycardia (HCC)- stable, pt s/p ICD since 2018  9. Dyslipidemia- controlled on atorvastatin  10. Bilateral carotid artery stenosis- asymptomatic, she follows with cardiology  11. B12 deficiency- otc b12 1000 mcg daily, check vit b12 level  -     Vitamin B12; Future; Expected date: 06/12/2024  12. Vitamin D deficiency- she was taking high dose vit d before, check level  13. Chronic heart failure with preserved ejection fraction (HFpEF) ([facility])- compensated, she follows with Dr. Blankenship  14. Other specified hypothyroidism- compliant with levothyroxine, normal TSH    History/Other:   Fall Risk Assessment:   She has been screened for Falls and is High Risk. Fall Prevention information provided to patient in After Visit Summary.    Do you feel unsteady when standing or walking?: Yes  Do you worry about falling?: No  Have you fallen in the past year?: Yes  How many times have you fallen?: (P) 2  Were you injured?: (P) No     Cognitive Assessment:   She had a  completely normal cognitive assessment - see flowsheet entries     Functional Ability/Status:   Sofiya Martell has some abnormal functions as listed below:  She has difficulties Affording Meds based on screening of functional status. She has Hearing problems based on screening of functional status.She has Vision problems based on screening of functional status. She has Walking problems based on screening of functional status.       Depression Screening (PHQ-2/PHQ-9): PHQ-2 SCORE: 0  , done 3/12/2024   Trouble falling or staying asleep, or sleeping too much: 2     Feeling tired or having little energy: 3    Poor appetite or overeating: 3    Feeling bad about yourself - or that you are a failure or have let yourself or your family down: 1    Trouble concentrating on things, such as reading the newspaper or watching television: 1    If you checked off any problems, how difficult have these problems made it for you to do your work, take care of things at home, or get along with other people?: Not difficult at all    Thoughts that you would be better off dead, or of hurting yourself in some way: 1             Advanced Directives:   She does NOT have a Living Will. [ ]  She does have a POA but we do NOT have it on file in Epic.    Not discussed      Patient Active Problem List   Diagnosis    HTN (hypertension)    Internal hemorrhoids without mention of complication    Hypothyroidism    Anxiety and depression    Unspecified hereditary and idiopathic peripheral neuropathy    Osteopenia    Low vitamin B12 level    Hypertrophic cardiomyopathy (HCC)    Bilateral carpal tunnel syndrome    Type 2 diabetes mellitus with obesity (HCC)    Ventricular tachycardia (HCC)    Vitamin D deficiency    Dyslipidemia    Bilateral carotid artery stenosis    Depression, recurrent (HCC)    COVID-19 virus infection    CKD (chronic kidney disease) stage 3, GFR 30-59 ml/min (HCC)    Chronic heart failure with preserved ejection fraction  (HFpEF) (HCC)     Allergies:  She is allergic to adhesive tape, ace inhibitors, and latex.    Current Medications:  Outpatient Medications Marked as Taking for the 3/12/24 encounter (Office Visit) with Kat Banerjee MD   Medication Sig    metFORMIN 500 MG Oral Tab Take 1 tablet (500 mg total) by mouth daily with breakfast.    levothyroxine 100 MCG Oral Tab TAKE 1 TABLET BY MOUTH EVERY MORNING BEFORE BREAKFAST    DULoxetine 60 MG Oral Cap DR Particles TAKE 1 CAPSULE(60 MG) BY MOUTH EVERY DAY    Glucose Blood (ONETOUCH VERIO) In Vitro Strip 1 strip by In Vitro route daily.    OneTouch Delica Lancets 33G Does not apply Misc 1 Lancet by Finger stick route daily as needed.    metoprolol succinate 100 MG Oral Tablet 24 Hr Take 1 tablet (100 mg total) by mouth daily.    atorvastatin 40 MG Oral Tab Take 1 tablet (40 mg total) by mouth daily.    ergocalciferol 1.25 MG (34583 UT) Oral Cap Take 1 capsule (50,000 Units total) by mouth once a week.    Olmesartan Medoxomil 5 MG Oral Tab Take 2 tablets (10 mg total) by mouth daily.    eplerenone 25 MG Oral Tab Take 0.5 tablets (12.5 mg total) by mouth daily.    latanoprost (XALATAN) 0.005 % Ophthalmic Solution Place 1 drop into both eyes nightly.    Aspirin 81 MG Oral Tab Take 1 tablet (81 mg total) by mouth daily.    diphenhydrAMINE-APAP, sleep,  MG Oral Tab Take 1 tablet by mouth nightly as needed.       Medical History:  She  has a past medical history of Arrhythmia, Atherosclerosis of coronary artery, Cardiomyopathy (HCC), Depression, Disorder of thyroid, Essential hypertension, Glaucoma, High blood pressure, High cholesterol, Hyperlipidemia, Lumbosacral stenosis, Osteoarthritis, Pacemaker, Presence of cardiac defibrillator, Thyroid disease, and Visual impairment.  Surgical History:  She  has a past surgical history that includes colonoscopy; cataract; and pacemaker/defibrillator (05/16/2018).   Family History:  Her family history includes Heart Disorder in her father  and son; subclavian stenosis in her son. She was adopted.  Social History:  She  reports that she has quit smoking. Her smoking use included cigarettes. She has never used smokeless tobacco. She reports current alcohol use. She reports that she does not use drugs.    Tobacco:  She smoked tobacco in the past but quit greater than 12 months ago.  Social History    Tobacco Use      Smoking status: Former        Types: Cigarettes      Smokeless tobacco: Never      Tobacco comments: quit 30 years ago as of 9/11/19       CAGE Alcohol Screen:   CAGE screening score of 0 on 3/11/2024, showing low risk of alcohol abuse.      Patient Care Team:  Kat Banerjee MD as PCP - General (Internal Medicine)  Rosita Morris (SURGERY, ORTHOPEDIC)  Darrell Ashley PA (Physician Assistant)  Dakotah Venegas MD as Consulting Physician (NEUROSURGERY)    Review of Systems     Negative except chronic REGALADO, chronic depression    Objective:   Physical Exam  General Appearance:  Alert, cooperative, no distress, appears stated age   Head:  Normocephalic, without obvious abnormality, atraumatic   Eyes:  PERRL, conjunctiva/corneas clear, EOM's intact both eyes   Ears:  Normal TM's and external ear canals, both ears   Nose: Nares normal, septum midline,mucosa normal, no drainage or sinus tenderness   Throat: Lips, mucosa, and tongue normal   Neck: Supple, symmetrical, trachea midline, no adenopathy;  thyroid: not enlarged, symmetric, no tenderness/mass/nodules; no carotid bruit or JVD   Back:   Symmetric, no curvature, ROM normal, no CVA tenderness   Lungs:   Clear to auscultation bilaterally, respirations unlabored   Heart:  Regular rate and rhythm, S1 and S2 normal   Abdomen:   Soft, non-tender, ND, nl BS   Pelvic: Deferred   Extremities: Extremities normal, atraumatic, no cyanosis or edema   Pulses: 2+ and symmetric   Skin: Skin color, texture, turgor normal, no rashes or lesions   Lymph nodes: Cervical, supraclavicular, and axillary nodes normal    Neurologic: Alert and oriented   Bilateral barefoot skin diabetic exam is normal, visualized feet and the appearance is normal.  Bilateral monofilament/sensation of both feet is normal.  Pulsation pedal pulse exam of both lower legs/feet is normal as well.       /66   Pulse 74   Resp 16   Ht 5' 2\" (1.575 m)   Wt 166 lb 6.4 oz (75.5 kg)   LMP  (LMP Unknown)   SpO2 97%   BMI 30.43 kg/m²  Estimated body mass index is 30.43 kg/m² as calculated from the following:    Height as of this encounter: 5' 2\" (1.575 m).    Weight as of this encounter: 166 lb 6.4 oz (75.5 kg).    Medicare Hearing Assessment:   Hearing Screening    Entry User: Ria Esparza CMA  Screening Method: Finger Rub  Finger Rub Result: Pass         Visual Acuity:   Right Eye Visual Acuity: Uncorrected Right Eye Chart Acuity: 20/40   Left Eye Visual Acuity: Uncorrected Left Eye Chart Acuity: 20/50   Both Eyes Visual Acuity: Uncorrected Both Eyes Chart Acuity: 20/30   Able To Tolerate Visual Acuity: Yes        Assessment & Plan:   Sofiya Martell is a 83 year old female who presents for a Medicare Assessment.     1. Encounter for wellness examination (Primary)- patient referred for dexa scan, declined mammogram this year. She does not need screening colonoscopy at age 83  2. Post-menopausal- check dexa, discussed adequate ca and vit d intake  -     XR DEXA BONE DENSITOMETRY (CPT=77080); Future; Expected date: 03/12/2024  3. Type 2 diabetes mellitus with obesity (HCC)- controlled, hgba1c 6.2. continue metformin. Foot exam wnl, she is up to date on eye exam  4. Stage 3a chronic kidney disease (HCC)- stable, continue to monitor  5. Hypertrophic cardiomyopathy (HCC)- stable, she follows with Dr. Blankenship  6. Depression, recurrent (HCC)- stable, continue duloxetine and therapy   7. Primary hypertension- controlled, CPM  8. Ventricular tachycardia (HCC)- stable, pt s/p ICD since 2018  9. Dyslipidemia- controlled on atorvastatin  10. Bilateral  carotid artery stenosis- asymptomatic, she follows with cardiology  11. B12 deficiency- otc b12 1000 mcg daily, check vit b12 level  -     Vitamin B12; Future; Expected date: 06/12/2024  12. Vitamin D deficiency- she was taking high dose vit d before, check level  -     Vitamin D, 25-Hydroxy; Future; Expected date: 06/12/2024  13. Chronic heart failure with preserved ejection fraction (HFpEF) (HCC)- compensated, she follows with Dr. Blankenship  14. Other specified hypothyroidism- clinically stable on levothyroxine 100mg daily, normal TSH    The patient indicates understanding of these issues and agrees to the plan.  Continue with current treatment plan.  Reinforced healthy diet, lifestyle, and exercise.      Return in about 3 months (around 6/24/2024), or if symptoms worsen or fail to improve, for follow up chronic issues.     Kat Banerjee MD, 3/12/2024     Supplementary Documentation:   General Health:  In the past six months, have you lost more than 10 pounds without trying?: 2 - No  Has your appetite been poor?: No  Type of Diet: Diabetic;Low Carb  How does the patient maintain a good energy level?: Other  How would you describe your daily physical activity?: Moderate  How would you describe your current health state?: Fair  How do you maintain positive mental well-being?: Social Interaction;Visiting Friends  On a scale of 0 to 10, with 0 being no pain and 10 being severe pain, what is your pain level?: 4 - (Moderate)  In the past six months, have you experienced urine leakage?: 1-Yes  At any time do you feel concerned for the safety/well-being of yourself and/or your children, in your home or elsewhere?: No  Have you had any immunizations at another office such as Influenza, Hepatitis B, Tetanus, or Pneumococcal?: Yes       Sofiya Martell's SCREENING SCHEDULE   Tests on this list are recommended by your physician but may not be covered, or covered at this frequency, by your insurer.   Please check with your  insurance carrier before scheduling to verify coverage.   PREVENTATIVE SERVICES FREQUENCY &  COVERAGE DETAILS LAST COMPLETION DATE   Diabetes Screening    Fasting Blood Sugar /  Glucose    One screening every 12 months if never tested or if previously tested but not diagnosed with pre-diabetes   One screening every 6 months if diagnosed with pre-diabetes Lab Results   Component Value Date     (H) 03/11/2024        Cardiovascular Disease Screening    Lipid Panel  Cholesterol  Lipoprotein (HDL)  Triglycerides Covered every 5 years for all Medicare beneficiaries without apparent signs or symptoms of cardiovascular disease Lab Results   Component Value Date    CHOLEST 135 03/11/2024    HDL 60 (H) 03/11/2024    LDL 58 03/11/2024    LDLD 69 02/09/2022    TRIG 91 03/11/2024         Electrocardiogram (EKG)   Covered if needed at Welcome to Medicare, and non-screening if indicated for medical reasons -      Ultrasound Screening for Abdominal Aortic Aneurysm (AAA) Covered once in a lifetime for one of the following risk factors    Men who are 65-75 years old and have ever smoked    Anyone with a family history -     Colorectal Cancer Screening  Covered for ages 50-85; only need ONE of the following:    Colonoscopy   Covered every 10 years    Covered every 2 years if patient is at high risk or previous colonoscopy was abnormal -    No recommendations at this time    Flexible Sigmoidoscopy   Covered every 4 years -    Fecal Occult Blood Test Covered annually -   Bone Density Screening    Bone density screening    Covered every 2 years after age 65 if diagnosed with risk of osteoporosis or estrogen deficiency.    Covered yearly for long-term glucocorticoid medication use (Steroids) Last Dexa Scan:    XR DEXA BONE DENSITOMETRY (CPT=77080) 04/02/2021      No recommendations at this time   Pap and Pelvic    Pap   Covered every 2 years for women at normal risk; Annually if at high risk -  No recommendations at this time     Chlamydia Annually if high risk -  No recommendations at this time   Screening Mammogram    Mammogram     Recommend annually for all female patients aged 40 and older    One baseline mammogram covered for patients aged 35-39 04/02/2021    No recommendations at this time    Immunizations    Influenza Covered once per flu season  Please get every year 12/28/2023  No recommendations at this time    Pneumococcal Each vaccine ([id number] & [id number]) covered once after 65 Prevnar 13: 11/20/2014    [id number]: 06/26/2015     No recommendations at this time    Hepatitis B One screening covered for patients with certain risk factors   -  No recommendations at this time    Tetanus Toxoid Not covered by Medicare Part B unless medically necessary (cut with metal); may be covered with your pharmacy prescription benefits -    Tetanus, Diptheria and Pertusis TD and TDaP Not covered by Medicare Part B -  No recommendations at this time    Zoster Not covered by Medicare Part B; may be covered with your pharmacy  prescription benefits -  Zoster Vaccines(1 of 2) Never done     Diabetes      Hemoglobin A1C Annually; if last result is elevated, may be asked to retest more frequently.    Medicare covers every 3 months Lab Results   Component Value Date     (H) 03/11/2024    A1C 6.2 (H) 03/11/2024       No recommendations at this time    Creat/alb ratio Annually Lab Results   Component Value Date    MICROALBCREA 11.5 03/11/2024       LDL Annually Lab Results   Component Value Date    LDL 58 03/11/2024       Dilated Eye Exam Annually Last Diabetic Eye Exam:  Last Dilated Eye Exam: 12/20/23  Eye Exam shows Diabetic Retinopathy?: No       Annual Monitoring of Persistent Medications (ACE/ARB, digoxin diuretics, anticonvulsants)    Potassium Annually Lab Results   Component Value Date    K 4.9 03/11/2024         Creatinine   Annually Lab Results   Component Value Date    CREATSERUM 1.03 (H) 03/11/2024         BUN Annually Lab Results    Component Value Date    BUN 22 03/11/2024       Drug Serum Conc Annually No results found for: \"DIGOXIN\", \"DIG\", \"VALP\"

## 2024-03-15 ENCOUNTER — TELEPHONE (OUTPATIENT)
Dept: CARDIOLOGY | Age: 84
End: 2024-03-15

## 2024-03-15 RX ORDER — FUROSEMIDE 20 MG/1
10 TABLET ORAL PRN
Qty: 30 TABLET | Refills: 1 | Status: SHIPPED | OUTPATIENT
Start: 2024-03-15

## 2024-03-17 ENCOUNTER — E-ADVICE (OUTPATIENT)
Dept: CARDIOLOGY | Age: 84
End: 2024-03-17

## 2024-03-17 PROBLEM — I50.32 CHRONIC HEART FAILURE WITH PRESERVED EJECTION FRACTION (HFPEF) (HCC): Status: ACTIVE | Noted: 2024-03-17

## 2024-03-17 PROBLEM — E66.9 TYPE 2 DIABETES MELLITUS WITH OBESITY (HCC): Status: ACTIVE | Noted: 2021-06-15

## 2024-03-17 PROBLEM — E11.69 TYPE 2 DIABETES MELLITUS WITH OBESITY (HCC): Status: ACTIVE | Noted: 2021-06-15

## 2024-03-18 ENCOUNTER — TELEPHONE (OUTPATIENT)
Dept: CARDIOLOGY | Age: 84
End: 2024-03-18

## 2024-03-18 RX ORDER — FUROSEMIDE 20 MG/1
10 TABLET ORAL PRN
Qty: 30 TABLET | Refills: 1 | Status: SHIPPED | OUTPATIENT
Start: 2024-03-18

## 2024-03-24 ENCOUNTER — E-ADVICE (OUTPATIENT)
Dept: CARDIOLOGY | Age: 84
End: 2024-03-24

## 2024-03-25 DIAGNOSIS — I42.2 HYPERTROPHIC CARDIOMYOPATHY (CMD): ICD-10-CM

## 2024-03-25 RX ORDER — METOPROLOL SUCCINATE 100 MG/1
100 TABLET, EXTENDED RELEASE ORAL DAILY
Qty: 90 TABLET | Refills: 3 | Status: SHIPPED | OUTPATIENT
Start: 2024-03-25

## 2024-03-26 ENCOUNTER — TELEPHONE (OUTPATIENT)
Dept: CARDIOLOGY | Age: 84
End: 2024-03-26

## 2024-03-26 DIAGNOSIS — I50.32 CHRONIC HEART FAILURE WITH PRESERVED EJECTION FRACTION (HFPEF) (CMD): Primary | ICD-10-CM

## 2024-03-27 RX ORDER — SPIRONOLACTONE 25 MG/1
12.5 TABLET ORAL DAILY
Qty: 45 TABLET | Refills: 3 | Status: SHIPPED | OUTPATIENT
Start: 2024-03-27

## 2024-03-28 RX ORDER — OLMESARTAN MEDOXOMIL 5 MG/1
10 TABLET ORAL DAILY
Qty: 180 TABLET | Refills: 3 | Status: SHIPPED | OUTPATIENT
Start: 2024-03-28

## 2024-04-23 ENCOUNTER — ANCILLARY PROCEDURE (OUTPATIENT)
Dept: CARDIOLOGY | Age: 84
End: 2024-04-23
Attending: INTERNAL MEDICINE

## 2024-04-23 DIAGNOSIS — Z95.810 ICD (IMPLANTABLE CARDIOVERTER-DEFIBRILLATOR) IN PLACE: ICD-10-CM

## 2024-05-01 ENCOUNTER — TELEPHONE (OUTPATIENT)
Dept: CARDIOLOGY | Age: 84
End: 2024-05-01

## 2024-05-01 RX ORDER — OLMESARTAN MEDOXOMIL 5 MG/1
10 TABLET ORAL DAILY
Qty: 180 TABLET | Refills: 3 | Status: SHIPPED | OUTPATIENT
Start: 2024-05-01

## 2024-05-08 ENCOUNTER — LAB SERVICES (OUTPATIENT)
Dept: LAB | Age: 84
End: 2024-05-08

## 2024-05-08 ENCOUNTER — APPOINTMENT (OUTPATIENT)
Dept: CARDIOLOGY | Age: 84
End: 2024-05-08
Attending: INTERNAL MEDICINE

## 2024-05-08 DIAGNOSIS — E55.9 VITAMIN D DEFICIENCY: ICD-10-CM

## 2024-05-08 DIAGNOSIS — E78.5 DYSLIPIDEMIA: ICD-10-CM

## 2024-05-08 DIAGNOSIS — I50.32 CHRONIC HEART FAILURE WITH PRESERVED EJECTION FRACTION (HFPEF)  (CMD): ICD-10-CM

## 2024-05-08 DIAGNOSIS — I65.23 ASYMPTOMATIC CAROTID ARTERY STENOSIS, BILATERAL: ICD-10-CM

## 2024-05-08 LAB
25(OH)D3+25(OH)D2 SERPL-MCNC: 46.4 NG/ML (ref 30–100)
ALBUMIN SERPL-MCNC: 3.4 G/DL (ref 3.6–5.1)
ALBUMIN/GLOB SERPL: 0.9 {RATIO} (ref 1–2.4)
ALP SERPL-CCNC: 83 UNITS/L (ref 45–117)
ALT SERPL-CCNC: 20 UNITS/L
AMB EXT ANION GAP: 7
AMB EXT BILIRUBIN, TOTAL: 0.6 MG/DL
AMB EXT BUN: 21 MG/DL
AMB EXT CALCIUM: 9.7
AMB EXT CARBON DIOXIDE: 27
AMB EXT CHLORIDE: 108
AMB EXT CHOL/HDL RATIO: 2.3
AMB EXT CHOLESTEROL, TOTAL: 139 MG/DL
AMB EXT CMP ALT: 20 U/L
AMB EXT CMP AST: 19 U/L
AMB EXT CREATININE: 1.02 MG/DL
AMB EXT GLUCOSE: 125 MG/DL
AMB EXT HDL CHOLESTEROL: 61 MG/DL
AMB EXT HEMATOCRIT: 41.5
AMB EXT HEMOGLOBIN: 13.8
AMB EXT LDL CHOLESTEROL, DIRECT: 56 MG/DL
AMB EXT MCV: 97
AMB EXT NON HDL CHOL: 78 MG/DL
AMB EXT PLATELETS: 268
AMB EXT POSTASSIUM: 4.7 MMOL/L
AMB EXT SODIUM: 137 MMOL/L
AMB EXT TOTAL PROTEIN: 7.3
AMB EXT TRIGLYCERIDES: 110 MG/DL
AMB EXT TSH: 0.18 MIU/ML
AMB EXT VITAMIN D, 25-HYDROXY: 46.4
AMB EXT WBC: 7.2 X10(3)UL
ANION GAP SERPL CALC-SCNC: 7 MMOL/L (ref 7–19)
AST SERPL-CCNC: 19 UNITS/L
BASOPHILS # BLD: 0.1 K/MCL (ref 0–0.3)
BASOPHILS NFR BLD: 1 %
BILIRUB SERPL-MCNC: 0.6 MG/DL (ref 0.2–1)
BUN SERPL-MCNC: 21 MG/DL (ref 6–20)
BUN/CREAT SERPL: 21 (ref 7–25)
CALCIUM SERPL-MCNC: 9.7 MG/DL (ref 8.4–10.2)
CHLORIDE SERPL-SCNC: 108 MMOL/L (ref 97–110)
CHOLEST SERPL-MCNC: 139 MG/DL
CHOLEST/HDLC SERPL: 2.3 {RATIO}
CO2 SERPL-SCNC: 27 MMOL/L (ref 21–32)
CREAT SERPL-MCNC: 1.02 MG/DL (ref 0.51–0.95)
DEPRECATED RDW RBC: 44.4 FL (ref 39–50)
EGFRCR SERPLBLD CKD-EPI 2021: 55 ML/MIN/{1.73_M2}
EOSINOPHIL # BLD: 0.2 K/MCL (ref 0–0.5)
EOSINOPHIL NFR BLD: 3 %
ERYTHROCYTE [DISTWIDTH] IN BLOOD: 12.5 % (ref 11–15)
FASTING DURATION TIME PATIENT: ABNORMAL H
GLOBULIN SER-MCNC: 3.9 G/DL (ref 2–4)
GLUCOSE SERPL-MCNC: 125 MG/DL (ref 70–99)
HCT VFR BLD CALC: 41.5 % (ref 36–46.5)
HDLC SERPL-MCNC: 61 MG/DL
HGB BLD-MCNC: 13.8 G/DL (ref 12–15.5)
IMM GRANULOCYTES # BLD AUTO: 0 K/MCL (ref 0–0.2)
IMM GRANULOCYTES # BLD: 0 %
LDLC SERPL CALC-MCNC: 56 MG/DL
LYMPHOCYTES # BLD: 2.3 K/MCL (ref 1–4)
LYMPHOCYTES NFR BLD: 32 %
MCH RBC QN AUTO: 32.2 PG (ref 26–34)
MCHC RBC AUTO-ENTMCNC: 33.3 G/DL (ref 32–36.5)
MCV RBC AUTO: 97 FL (ref 78–100)
MONOCYTES # BLD: 0.6 K/MCL (ref 0.3–0.9)
MONOCYTES NFR BLD: 8 %
NEUTROPHILS # BLD: 4 K/MCL (ref 1.8–7.7)
NEUTROPHILS NFR BLD: 56 %
NONHDLC SERPL-MCNC: 78 MG/DL
NRBC BLD MANUAL-RTO: 0 /100 WBC
NT-PROBNP SERPL-MCNC: 637 PG/ML
PLATELET # BLD AUTO: 268 K/MCL (ref 140–450)
POTASSIUM SERPL-SCNC: 4.7 MMOL/L (ref 3.4–5.1)
PROT SERPL-MCNC: 7.3 G/DL (ref 6.4–8.2)
RBC # BLD: 4.28 MIL/MCL (ref 4–5.2)
SODIUM SERPL-SCNC: 137 MMOL/L (ref 135–145)
TRIGL SERPL-MCNC: 110 MG/DL
TSH SERPL-ACNC: 0.18 MCUNITS/ML (ref 0.35–5)
WBC # BLD: 7.2 K/MCL (ref 4.2–11)

## 2024-05-08 PROCEDURE — 82306 VITAMIN D 25 HYDROXY: CPT | Performed by: CLINICAL MEDICAL LABORATORY

## 2024-05-08 PROCEDURE — 84443 ASSAY THYROID STIM HORMONE: CPT | Performed by: INTERNAL MEDICINE

## 2024-05-08 PROCEDURE — 80053 COMPREHEN METABOLIC PANEL: CPT | Performed by: INTERNAL MEDICINE

## 2024-05-08 PROCEDURE — 83880 ASSAY OF NATRIURETIC PEPTIDE: CPT | Performed by: INTERNAL MEDICINE

## 2024-05-08 PROCEDURE — 36415 COLL VENOUS BLD VENIPUNCTURE: CPT | Performed by: INTERNAL MEDICINE

## 2024-05-08 PROCEDURE — 85025 COMPLETE CBC W/AUTO DIFF WBC: CPT | Performed by: INTERNAL MEDICINE

## 2024-05-08 PROCEDURE — 80061 LIPID PANEL: CPT | Performed by: INTERNAL MEDICINE

## 2024-05-08 PROCEDURE — 93880 EXTRACRANIAL BILAT STUDY: CPT | Performed by: INTERNAL MEDICINE

## 2024-05-09 ENCOUNTER — TELEPHONE (OUTPATIENT)
Dept: INTERNAL MEDICINE CLINIC | Facility: CLINIC | Age: 84
End: 2024-05-09

## 2024-05-09 NOTE — TELEPHONE ENCOUNTER
Received lab results from [facility] from 5/8/24 collection.  Abstracted.  Put in TB bin for review.

## 2024-05-14 ENCOUNTER — TELEPHONE (OUTPATIENT)
Age: 84
End: 2024-05-14

## 2024-05-15 ENCOUNTER — APPOINTMENT (OUTPATIENT)
Dept: CARDIOLOGY | Age: 84
End: 2024-05-15

## 2024-05-15 VITALS
RESPIRATION RATE: 18 BRPM | DIASTOLIC BLOOD PRESSURE: 67 MMHG | HEART RATE: 73 BPM | SYSTOLIC BLOOD PRESSURE: 121 MMHG | HEIGHT: 63 IN | BODY MASS INDEX: 28.46 KG/M2 | WEIGHT: 160.6 LBS

## 2024-05-15 DIAGNOSIS — E78.00 PURE HYPERCHOLESTEROLEMIA: Primary | ICD-10-CM

## 2024-05-15 DIAGNOSIS — E78.5 DYSLIPIDEMIA: ICD-10-CM

## 2024-05-15 DIAGNOSIS — E55.9 VITAMIN D DEFICIENCY: ICD-10-CM

## 2024-05-15 DIAGNOSIS — I42.2 HYPERTROPHIC CARDIOMYOPATHY  (CMD): ICD-10-CM

## 2024-05-15 DIAGNOSIS — I50.32 CHRONIC HEART FAILURE WITH PRESERVED EJECTION FRACTION (HFPEF)  (CMD): ICD-10-CM

## 2024-05-15 SDOH — HEALTH STABILITY: PHYSICAL HEALTH: ON AVERAGE, HOW MANY MINUTES DO YOU ENGAGE IN EXERCISE AT THIS LEVEL?: 0 MIN

## 2024-05-15 SDOH — HEALTH STABILITY: PHYSICAL HEALTH: ON AVERAGE, HOW MANY DAYS PER WEEK DO YOU ENGAGE IN MODERATE TO STRENUOUS EXERCISE (LIKE A BRISK WALK)?: 0 DAYS

## 2024-05-15 ASSESSMENT — ENCOUNTER SYMPTOMS
DIZZINESS: 1
CHILLS: 0
BRUISES/BLEEDS EASILY: 0
HEMOPTYSIS: 0
WEIGHT LOSS: 1
INSOMNIA: 0
FEVER: 0
DEPRESSION: 0
SHORTNESS OF BREATH: 0
ALLERGIC/IMMUNOLOGIC COMMENTS: NO NEW FOOD ALLERGIES
ABDOMINAL PAIN: 0
SUSPICIOUS LESIONS: 0
CONSTIPATION: 0
LIGHT-HEADEDNESS: 1
HEMATOCHEZIA: 0
HEADACHES: 0
DIARRHEA: 0
SLEEP DISTURBANCES DUE TO BREATHING: 0
COUGH: 0
BACK PAIN: 1
BLOATING: 0

## 2024-05-15 ASSESSMENT — PATIENT HEALTH QUESTIONNAIRE - PHQ9
CLINICAL INTERPRETATION OF PHQ2 SCORE: NO FURTHER SCREENING NEEDED
SUM OF ALL RESPONSES TO PHQ9 QUESTIONS 1 AND 2: 0
2. FEELING DOWN, DEPRESSED OR HOPELESS: NOT AT ALL
SUM OF ALL RESPONSES TO PHQ9 QUESTIONS 1 AND 2: 0
1. LITTLE INTEREST OR PLEASURE IN DOING THINGS: NOT AT ALL

## 2024-05-16 ENCOUNTER — TELEPHONE (OUTPATIENT)
Dept: INTERNAL MEDICINE CLINIC | Facility: CLINIC | Age: 84
End: 2024-05-16

## 2024-05-16 DIAGNOSIS — E07.9 THYROID DYSFUNCTION: Primary | ICD-10-CM

## 2024-05-16 NOTE — TELEPHONE ENCOUNTER
Patient went to see cardiologist Dr. Blankenship and he did some labs and told her that her thyroid level was low and to call her pcp to discuss options

## 2024-05-16 NOTE — TELEPHONE ENCOUNTER
Last annual 3/12/24, patient currently on Levothyroxine 100mcg.      Component  Ref Range & Units 5/8/24 10:44 AM   TSH  0.350 - 5.000 mcUnits/mL 0.179 Low      Routing to Dr Banerjee for review and recommendations.

## 2024-05-17 RX ORDER — LEVOTHYROXINE SODIUM 88 UG/1
88 TABLET ORAL
Qty: 60 TABLET | Refills: 0 | Status: SHIPPED | OUTPATIENT
Start: 2024-05-17

## 2024-05-17 NOTE — TELEPHONE ENCOUNTER
Was she taking biotin supplement because that can affect the values  If not, I would reduce levothyroxine to 88 mcg daily from 100mg and rpt labs in 2 months

## 2024-05-17 NOTE — TELEPHONE ENCOUNTER
Advised patient of recommendations to lower Levothyroxine dosage to 88 mcg.  Patient states she is so tired all the time she can hardly do anything.  If she stands up her BP drops, she has to hold a shopping cart when out at stores, falls on the couch when she gets home. Her ankles are swollen, she is taking a diuretic and drinking 64 oz of water a day. Patient mentioned a friend who had thyroid cancer. Patient looking for any further recommendations regarding extreme fatigue.

## 2024-05-20 RX ORDER — LEVOTHYROXINE SODIUM 88 UG/1
TABLET ORAL
Qty: 90 TABLET | Refills: 0 | OUTPATIENT
Start: 2024-05-20

## 2024-05-20 NOTE — TELEPHONE ENCOUNTER
Please call pt to schedule extended f/u visit with MD Lavonne or other available provider.  Thanks.

## 2024-05-22 ENCOUNTER — NURSE TRIAGE (OUTPATIENT)
Dept: INTERNAL MEDICINE CLINIC | Facility: CLINIC | Age: 84
End: 2024-05-22

## 2024-05-22 NOTE — TELEPHONE ENCOUNTER
Left message for patient to schedule extended follow up with DR. SAM BARRY or DAINA.    Sending [email or web address] message

## 2024-05-22 NOTE — TELEPHONE ENCOUNTER
Action Requested: Summary for Provider     []  Critical Lab, Recommendations Needed  [] Need Additional Advice  []   FYI    []   Need Orders  [] Need Medications Sent to Pharmacy  []  Other     SUMMARY: Patient reports sick symptoms x 9 days, including runny nose, PND, productive cough, chest congestion, wheezing.  Advised patient to go to IC for evaluation today.  She confirms understanding and will go.     Reason for call: Upper Respiratory Infection  Onset: 9 days    Patient denies fever, sore throat.  Using cough drops.     Reason for Disposition   Wheezing is present    Protocols used: Cough-A-OH

## 2024-05-24 NOTE — TELEPHONE ENCOUNTER
Future Appointments   Date Time Provider Department Center   5/29/2024  1:00 PM Denise Mehta PA-C [facility]   7/9/2024  1:40 PM Kat Banerjee MD [facility]

## 2024-05-28 NOTE — PROGRESS NOTES
Chief Complaint   Patient presents with    Follow - Up     EJ RM 2- Pt is here to f/u on her runny nose and cough but state it has cleared and she feeling better    Medication Problem     Here discuss thyroid med       HPI:  Pt presents for clarification about her thyroid test and decrease in thyroid medication.  She has constant fatigue and so is concerned with decreasing her dose of Levothyroxine.  Pt admits to chronic fatigue.  She admits she does not sleep well, she has early AM awakening with difficulty falling back to sleep as her mind starts to race.  She admits to feeling depressed, she is compliant with Cymbalta (and on max dose).  Pt has done counseling in the past, stopped due to move but willing to consider again.  States she has been referred to Lyssa Nogueira but told she cannot see her due to insurance.  Pt could try the counselor she had seen in the past, just last time she tried the counselor was \"busy.\"  Pt reports she has been tested for sleep apnea and it was negative.      Pt also c/o a constant runny nose with cough, sxs started 2 weeks ago.  Nasal discharge and phlegm produced from cough was clear and watery as well.  Overall she has been seeing improvement.  Her cough is less and much less runny nose today as well.  She has had some wheezing as well, but that too has improved.      Review of Systems   See HPI.  No other complaints today.    Past Medical History:    Arrhythmia    Atherosclerosis of coronary artery    Cardiomyopathy (HCC)    Depression    Disorder of thyroid    Essential hypertension    Glaucoma    High blood pressure    High cholesterol    Hyperlipidemia    Lumbosacral stenosis    Osteoarthritis    Pacemaker    Presence of cardiac defibrillator    Thyroid disease    Visual impairment       Patient Active Problem List   Diagnosis    HTN (hypertension)    Internal hemorrhoids without mention of complication    Hypothyroidism    Anxiety and depression    Unspecified hereditary  and idiopathic peripheral neuropathy    Osteopenia    Low vitamin B12 level    Hypertrophic cardiomyopathy (HCC)    Bilateral carpal tunnel syndrome    Type 2 diabetes mellitus with obesity (HCC)    Ventricular tachycardia (HCC)    Vitamin D deficiency    Dyslipidemia    Bilateral carotid artery stenosis    Depression, recurrent (HCC)    COVID-19 virus infection    CKD (chronic kidney disease) stage 3, GFR 30-59 ml/min (HCC)    Chronic heart failure with preserved ejection fraction (HFpEF) ([facility])       Current Outpatient Medications   Medication Sig Dispense Refill    levothyroxine 88 MCG Oral Tab Take 1 tablet (88 mcg total) by mouth before breakfast. Repeat labs in 2 months 60 tablet 0    metFORMIN 500 MG Oral Tab Take 1 tablet (500 mg total) by mouth daily with breakfast.      DULoxetine 60 MG Oral Cap DR Particles TAKE 1 CAPSULE(60 MG) BY MOUTH EVERY DAY 90 capsule 1    Glucose Blood (ONETOUCH VERIO) In Vitro Strip 1 strip by In Vitro route daily. 100 strip 1    OneTouch Delica Lancets 33G Does not apply Misc 1 Lancet by Finger stick route daily as needed. 100 each 1    metoprolol succinate 100 MG Oral Tablet 24 Hr Take 1 tablet (100 mg total) by mouth daily.      atorvastatin 40 MG Oral Tab Take 1 tablet (40 mg total) by mouth daily. 90 tablet 1    Olmesartan Medoxomil 5 MG Oral Tab Take 2 tablets (10 mg total) by mouth daily.      latanoprost (XALATAN) 0.005 % Ophthalmic Solution Place 1 drop into both eyes nightly.  2    Aspirin 81 MG Oral Tab Take 1 tablet (81 mg total) by mouth daily.      diphenhydrAMINE-APAP, sleep,  MG Oral Tab Take 1 tablet by mouth nightly as needed.         Physical Exam  /68   Pulse 63   Temp 97.2 °F (36.2 °C) (Temporal)   Resp 18   Ht 5' 2\" (1.575 m)   Wt 158 lb (71.7 kg)   LMP  (LMP Unknown)   SpO2 96%   BMI 28.90 kg/m²   Constitutional:  No distress.   HEENT:  Normocephalic and atraumatic. Tympanic membranes normal.  Nose normal. Oropharynx is clear and moist.    Eyes: Conjunctivae are normal. PERRLA.  Neck: Normal range of motion. Neck supple.   Cardiovascular: Normal rate, regular rhythm.  No murmur, rubs or gallops.   Pulmonary/Chest: Effort normal and breath sounds normal. No respiratory distress. No wheezes, rhonchi or rales    Skin: Skin is warm and dry. No rash noted. No erythema. No pallor.       A/P:    Encounter Diagnoses   Name Primary?    Viral upper respiratory tract infection- resolving.  F/U if sxs do not continue to improve/resolve or if new sxs develop. Yes    Other specified hypothyroidism - Reviewed low TSH and pt.  Reviewed weight based doing of Levothyroxine and pt's wt loss over time.  Pt voiced understanding.  She will continue with Levothyroxine 88 mcg daily and repeat her labs in mid July as ordered.     Anxiety and depression - sub-optimal control on max dose of Cymbalta.  Encouraged pt to give counseling another try, she will consider.  Possibly consider change in medication or referral to med mgmt clinic in the future.     Chronic fatigue - Reviewed labs with pt.  Sounds like pt does not rest well at night due to her uncontrolled anxiety and depression.  TSH low, Vit D normal.         No orders of the defined types were placed in this encounter.      Meds & Refills for this Visit:  Requested Prescriptions      No prescriptions requested or ordered in this encounter       Imaging & Consults:  None    Return in about 6 weeks (around 7/10/2024).  There are no Patient Instructions on file for this visit.    All questions were answered and the patient understands the plan.

## 2024-05-29 ENCOUNTER — OFFICE VISIT (OUTPATIENT)
Dept: INTERNAL MEDICINE CLINIC | Facility: CLINIC | Age: 84
End: 2024-05-29

## 2024-05-29 VITALS
HEART RATE: 63 BPM | OXYGEN SATURATION: 96 % | HEIGHT: 62 IN | TEMPERATURE: 97 F | RESPIRATION RATE: 18 BRPM | BODY MASS INDEX: 29.08 KG/M2 | DIASTOLIC BLOOD PRESSURE: 68 MMHG | SYSTOLIC BLOOD PRESSURE: 118 MMHG | WEIGHT: 158 LBS

## 2024-05-29 DIAGNOSIS — F32.A ANXIETY AND DEPRESSION: ICD-10-CM

## 2024-05-29 DIAGNOSIS — R53.82 CHRONIC FATIGUE: ICD-10-CM

## 2024-05-29 DIAGNOSIS — J06.9 VIRAL UPPER RESPIRATORY TRACT INFECTION: Primary | ICD-10-CM

## 2024-05-29 DIAGNOSIS — E03.8 OTHER SPECIFIED HYPOTHYROIDISM: ICD-10-CM

## 2024-05-29 DIAGNOSIS — F41.9 ANXIETY AND DEPRESSION: ICD-10-CM

## 2024-05-29 PROCEDURE — 1159F MED LIST DOCD IN RCRD: CPT | Performed by: PHYSICIAN ASSISTANT

## 2024-05-29 PROCEDURE — 3074F SYST BP LT 130 MM HG: CPT | Performed by: PHYSICIAN ASSISTANT

## 2024-05-29 PROCEDURE — G2211 COMPLEX E/M VISIT ADD ON: HCPCS | Performed by: PHYSICIAN ASSISTANT

## 2024-05-29 PROCEDURE — 3078F DIAST BP <80 MM HG: CPT | Performed by: PHYSICIAN ASSISTANT

## 2024-05-29 PROCEDURE — 99214 OFFICE O/P EST MOD 30 MIN: CPT | Performed by: PHYSICIAN ASSISTANT

## 2024-05-29 PROCEDURE — 3008F BODY MASS INDEX DOCD: CPT | Performed by: PHYSICIAN ASSISTANT

## 2024-06-11 ENCOUNTER — TELEPHONE (OUTPATIENT)
Dept: CARDIOLOGY | Age: 84
End: 2024-06-11

## 2024-06-11 ENCOUNTER — E-ADVICE (OUTPATIENT)
Dept: CARDIOLOGY | Age: 84
End: 2024-06-11

## 2024-06-11 DIAGNOSIS — I50.32 CHRONIC HEART FAILURE WITH PRESERVED EJECTION FRACTION (HFPEF)  (CMD): Primary | ICD-10-CM

## 2024-06-11 RX ORDER — DAPAGLIFLOZIN 10 MG/1
10 TABLET, FILM COATED ORAL DAILY
Status: SHIPPED | COMMUNITY
Start: 2024-06-11

## 2024-06-16 RX ORDER — DULOXETIN HYDROCHLORIDE 60 MG/1
CAPSULE, DELAYED RELEASE ORAL
Qty: 90 CAPSULE | Refills: 3 | Status: SHIPPED | OUTPATIENT
Start: 2024-06-16

## 2024-06-16 NOTE — TELEPHONE ENCOUNTER
Refill Per Protocol     Requested Prescriptions   Pending Prescriptions Disp Refills    DULOXETINE 60 MG Oral Cap DR Particles [Pharmacy Med Name: DULOXETINE DR 60MG CAPSULES] 90 capsule 1     Sig: TAKE 1 CAPSULE(60 MG) BY MOUTH EVERY DAY       Psychiatric Non-Scheduled (Anti-Anxiety) Passed - 6/13/2024  3:18 PM        Passed - In person appointment or virtual visit in the past 6 mos or appointment in next 3 mos     Recent Outpatient Visits              2 weeks ago Viral upper respiratory tract infection    [address] Denise Mehta PA-C    Office Visit    3 months ago [address] for wellness examination    [address]    Office Visit    7 months ago     [facility]    Nurse Only    7 months ago Carpal tunnel syndrome of right wrist    [facility] Ranjit Kim MD    Office Visit    7 months ago COVID-19 virus infection    [address] Kat Banerjee MD    Office Visit          Future Appointments         Provider Department Appt Notes    In 1 week PF DEXA [address] DEXA - [place]     In 1 week PF [facility] [place], [address] [place] [address] faxsmart,  epic ds    In 3 weeks Kat Banerjee MD [address] 3 mo fup                    Passed - Depression Screening completed within the past 12 months               Future Appointments         Provider Department Appt Notes    In 1 week PF DEXA [address] DEXA - [place]     In 1 week PF [facility] [place], [address] [place] [address] faxsmart,  epic ds    In 3 weeks Kat Banerjee MD [address] 3 mo fup          Recent Outpatient Visits              2 weeks ago Viral upper respiratory tract  infection    [facility], [address] [place] [address], Denise Stevens PA-C    Office Visit    3 months ago Encounter for wellness examination    [facility], [address] [place] [address], Kat Abdi MD    Office Visit    7 months ago     [facility], [facility]    Nurse Only    7 months ago Carpal tunnel syndrome of right wrist    [facility], [facility] Ranjit Kim MD    Office Visit    7 months ago COVID-19 virus infection    [facility], [address] [place] [address], Kat Abdi MD    Office Visit

## 2024-06-18 ENCOUNTER — TELEPHONE (OUTPATIENT)
Dept: INTERNAL MEDICINE CLINIC | Facility: CLINIC | Age: 84
End: 2024-06-18

## 2024-06-18 ENCOUNTER — E-ADVICE (OUTPATIENT)
Dept: CARDIOLOGY | Age: 84
End: 2024-06-18

## 2024-06-18 ENCOUNTER — TELEPHONE (OUTPATIENT)
Dept: CARDIOLOGY | Age: 84
End: 2024-06-18

## 2024-06-18 DIAGNOSIS — N39.0 URINARY TRACT INFECTION WITHOUT HEMATURIA, SITE UNSPECIFIED: Primary | ICD-10-CM

## 2024-06-18 NOTE — TELEPHONE ENCOUNTER
Patient contacted [facility] stating she had been through [facility] previously (2022).  She was concerned that her fasting numbers have been increasing 145, 138, 128 the last three days.  States her cardiologist just started her on Farxiga about a week ago.  Discussed that she should bring concerns to PCP office, since we are not managing her diabetes medications, etc.      Last A1c value was 6.2% done 3/11/2024.

## 2024-06-21 LAB
APPEARANCE UR: CLEAR
BACTERIA #/AREA URNS HPF: ABNORMAL /HPF
BACTERIA UR CULT: ABNORMAL
BACTERIA UR CULT: ABNORMAL
BASOPHILS # BLD AUTO: 74 CELLS/UL (ref 0–200)
BASOPHILS NFR BLD AUTO: 0.8 %
BILIRUB UR QL STRIP: NEGATIVE
BUN SERPL-MCNC: 22 MG/DL (ref 7–25)
BUN/CREAT SERPL: 22 (CALC) (ref 6–22)
CALCIUM SERPL-MCNC: 10.2 MG/DL (ref 8.6–10.4)
CHLORIDE SERPL-SCNC: 104 MMOL/L (ref 98–110)
CO2 SERPL-SCNC: 22 MMOL/L (ref 20–32)
COLOR UR: YELLOW
CREAT SERPL-MCNC: 0.98 MG/DL (ref 0.6–0.95)
EGFRCR SERPLBLD CKD-EPI 2021: 58 ML/MIN/1.73M2
EOSINOPHIL # BLD AUTO: 212 CELLS/UL (ref 15–500)
EOSINOPHIL NFR BLD AUTO: 2.3 %
ERYTHROCYTE [DISTWIDTH] IN BLOOD BY AUTOMATED COUNT: 12.1 % (ref 11–15)
GLUCOSE SERPL-MCNC: 109 MG/DL (ref 65–99)
GLUCOSE UR QL STRIP: ABNORMAL
HCT VFR BLD AUTO: 42.2 % (ref 35–45)
HGB BLD-MCNC: 14.2 G/DL (ref 11.7–15.5)
HGB UR QL STRIP: NEGATIVE
HYALINE CASTS #/AREA URNS LPF: ABNORMAL /LPF
KETONES UR QL STRIP: NEGATIVE
LEUKOCYTE ESTERASE UR QL STRIP: ABNORMAL
LYMPHOCYTES # BLD AUTO: 2594 CELLS/UL (ref 850–3900)
LYMPHOCYTES NFR BLD AUTO: 28.2 %
MCH RBC QN AUTO: 31.9 PG (ref 27–33)
MCHC RBC AUTO-ENTMCNC: 33.6 G/DL (ref 32–36)
MCV RBC AUTO: 94.8 FL (ref 80–100)
MONOCYTES # BLD AUTO: 561 CELLS/UL (ref 200–950)
MONOCYTES NFR BLD AUTO: 6.1 %
NEUTROPHILS # BLD AUTO: 5759 CELLS/UL (ref 1500–7800)
NEUTROPHILS NFR BLD AUTO: 62.6 %
NITRITE UR QL STRIP: NEGATIVE
PH UR STRIP: ABNORMAL [PH] (ref 5–8)
PLATELET # BLD AUTO: 353 THOUSAND/UL (ref 140–400)
PMV BLD REES-ECKER: 9.6 FL (ref 7.5–12.5)
POTASSIUM SERPL-SCNC: 5.1 MMOL/L (ref 3.5–5.3)
PROT UR QL STRIP: NEGATIVE
RBC # BLD AUTO: 4.45 MILLION/UL (ref 3.8–5.1)
RBC #/AREA URNS HPF: ABNORMAL /HPF
SERVICE CMNT-IMP: ABNORMAL
SODIUM SERPL-SCNC: 136 MMOL/L (ref 135–146)
SP GR UR STRIP: 1 (ref 1–1.03)
SQUAMOUS #/AREA URNS HPF: ABNORMAL /HPF
WBC # BLD AUTO: 9.2 THOUSAND/UL (ref 3.8–10.8)
WBC #/AREA URNS HPF: ABNORMAL /HPF

## 2024-06-23 LAB
APPEARANCE UR: CLEAR
BACTERIA #/AREA URNS HPF: ABNORMAL /HPF
BACTERIA UR CULT: ABNORMAL
BACTERIA UR CULT: ABNORMAL
BASOPHILS # BLD AUTO: 74 CELLS/UL (ref 0–200)
BASOPHILS NFR BLD AUTO: 0.8 %
BILIRUB UR QL STRIP: NEGATIVE
COLOR UR: YELLOW
EOSINOPHIL # BLD AUTO: 212 CELLS/UL (ref 15–500)
EOSINOPHIL NFR BLD AUTO: 2.3 %
ERYTHROCYTE [DISTWIDTH] IN BLOOD BY AUTOMATED COUNT: 12.1 % (ref 11–15)
GLUCOSE UR QL STRIP: ABNORMAL
HCT VFR BLD AUTO: 42.2 % (ref 35–45)
HGB BLD-MCNC: 14.2 G/DL (ref 11.7–15.5)
HGB UR QL STRIP: NEGATIVE
HYALINE CASTS #/AREA URNS LPF: ABNORMAL /LPF
KETONES UR QL STRIP: NEGATIVE
LEUKOCYTE ESTERASE UR QL STRIP: ABNORMAL
LYMPHOCYTES # BLD AUTO: 2594 CELLS/UL (ref 850–3900)
LYMPHOCYTES NFR BLD AUTO: 28.2 %
MCH RBC QN AUTO: 31.9 PG (ref 27–33)
MCHC RBC AUTO-ENTMCNC: 33.6 G/DL (ref 32–36)
MCV RBC AUTO: 94.8 FL (ref 80–100)
MONOCYTES # BLD AUTO: 561 CELLS/UL (ref 200–950)
MONOCYTES NFR BLD AUTO: 6.1 %
NEUTROPHILS # BLD AUTO: 5759 CELLS/UL (ref 1500–7800)
NEUTROPHILS NFR BLD AUTO: 62.6 %
NITRITE UR QL STRIP: NEGATIVE
PH UR STRIP: ABNORMAL [PH] (ref 5–8)
PLATELET # BLD AUTO: 353 THOUSAND/UL (ref 140–400)
PMV BLD REES-ECKER: 9.6 FL (ref 7.5–12.5)
PROT UR QL STRIP: NEGATIVE
RBC # BLD AUTO: 4.45 MILLION/UL (ref 3.8–5.1)
RBC #/AREA URNS HPF: ABNORMAL /HPF
SERVICE CMNT-IMP: ABNORMAL
SP GR UR STRIP: 1 (ref 1–1.03)
SQUAMOUS #/AREA URNS HPF: ABNORMAL /HPF
WBC # BLD AUTO: 9.2 THOUSAND/UL (ref 3.8–10.8)
WBC #/AREA URNS HPF: ABNORMAL /HPF

## 2024-06-24 ENCOUNTER — TELEPHONE (OUTPATIENT)
Dept: CARDIOLOGY | Age: 84
End: 2024-06-24

## 2024-06-25 ENCOUNTER — TELEPHONE (OUTPATIENT)
Dept: CARDIOLOGY | Age: 84
End: 2024-06-25

## 2024-06-27 ENCOUNTER — LAB ENCOUNTER (OUTPATIENT)
Dept: LAB | Age: 84
End: 2024-06-27
Attending: INTERNAL MEDICINE

## 2024-06-27 ENCOUNTER — HOSPITAL ENCOUNTER (OUTPATIENT)
Dept: BONE DENSITY | Age: 84
Discharge: HOME | End: 2024-06-27
Attending: INTERNAL MEDICINE

## 2024-06-27 DIAGNOSIS — E55.9 VITAMIN D DEFICIENCY: ICD-10-CM

## 2024-06-27 DIAGNOSIS — N18.31 STAGE 3A CHRONIC KIDNEY DISEASE (HCC): ICD-10-CM

## 2024-06-27 DIAGNOSIS — Z78.0 POST-MENOPAUSAL: ICD-10-CM

## 2024-06-27 DIAGNOSIS — E11.69 TYPE 2 DIABETES MELLITUS WITH OBESITY (HCC): ICD-10-CM

## 2024-06-27 DIAGNOSIS — E53.8 B12 DEFICIENCY: ICD-10-CM

## 2024-06-27 DIAGNOSIS — E66.9 TYPE 2 DIABETES MELLITUS WITH OBESITY (HCC): ICD-10-CM

## 2024-06-27 LAB
ANION GAP SERPL CALC-SCNC: 8 MMOL/L (ref 0–18)
BASOPHILS # BLD AUTO: 0.08 X10(3) UL (ref 0–0.2)
BASOPHILS NFR BLD AUTO: 1 %
BUN BLD-MCNC: 21 MG/DL (ref 9–23)
CALCIUM BLD-MCNC: 9.6 MG/DL (ref 8.5–10.1)
CHLORIDE SERPL-SCNC: 109 MMOL/L (ref 98–112)
CO2 SERPL-SCNC: 23 MMOL/L (ref 21–32)
CREAT BLD-MCNC: 1.02 MG/DL
EGFRCR SERPLBLD CKD-EPI 2021: 55 ML/MIN/1.73M2 (ref 60–?)
EOSINOPHIL # BLD AUTO: 0.25 X10(3) UL (ref 0–0.7)
EOSINOPHIL NFR BLD AUTO: 3 %
ERYTHROCYTE [DISTWIDTH] IN BLOOD BY AUTOMATED COUNT: 13.1 %
EST. AVERAGE GLUCOSE BLD GHB EST-MCNC: 128 MG/DL (ref 68–126)
FASTING STATUS PATIENT QL REPORTED: YES
GLUCOSE BLD-MCNC: 113 MG/DL (ref 70–99)
HBA1C MFR BLD: 6.1 % (ref ?–5.7)
HCT VFR BLD AUTO: 40.2 %
HGB BLD-MCNC: 14 G/DL
IMM GRANULOCYTES # BLD AUTO: 0.03 X10(3) UL (ref 0–1)
IMM GRANULOCYTES NFR BLD: 0.4 %
LYMPHOCYTES # BLD AUTO: 2.38 X10(3) UL (ref 1–4)
LYMPHOCYTES NFR BLD AUTO: 28.8 %
MCH RBC QN AUTO: 32.6 PG (ref 26–34)
MCHC RBC AUTO-ENTMCNC: 34.8 G/DL (ref 31–37)
MCV RBC AUTO: 93.7 FL
MONOCYTES # BLD AUTO: 0.5 X10(3) UL (ref 0.1–1)
MONOCYTES NFR BLD AUTO: 6 %
NEUTROPHILS # BLD AUTO: 5.03 X10 (3) UL (ref 1.5–7.7)
NEUTROPHILS # BLD AUTO: 5.03 X10(3) UL (ref 1.5–7.7)
NEUTROPHILS NFR BLD AUTO: 60.8 %
OSMOLALITY SERPL CALC.SUM OF ELEC: 294 MOSM/KG (ref 275–295)
PLATELET # BLD AUTO: 299 10(3)UL (ref 150–450)
POTASSIUM SERPL-SCNC: 4.7 MMOL/L (ref 3.5–5.1)
RBC # BLD AUTO: 4.29 X10(6)UL
SODIUM SERPL-SCNC: 140 MMOL/L (ref 136–145)
VIT B12 SERPL-MCNC: 492 PG/ML (ref 193–986)
VIT D+METAB SERPL-MCNC: 44.1 NG/ML (ref 30–100)
WBC # BLD AUTO: 8.3 X10(3) UL (ref 4–11)

## 2024-06-27 PROCEDURE — 82607 VITAMIN B-12: CPT

## 2024-06-27 PROCEDURE — 77080 DXA BONE DENSITY AXIAL: CPT | Performed by: INTERNAL MEDICINE

## 2024-06-27 PROCEDURE — 83036 HEMOGLOBIN GLYCOSYLATED A1C: CPT

## 2024-06-27 PROCEDURE — 36415 COLL VENOUS BLD VENIPUNCTURE: CPT

## 2024-06-27 PROCEDURE — 85025 COMPLETE CBC W/AUTO DIFF WBC: CPT

## 2024-06-27 PROCEDURE — 82306 VITAMIN D 25 HYDROXY: CPT

## 2024-06-27 PROCEDURE — 80048 BASIC METABOLIC PNL TOTAL CA: CPT

## 2024-07-09 ENCOUNTER — OFFICE VISIT (OUTPATIENT)
Dept: INTERNAL MEDICINE CLINIC | Facility: CLINIC | Age: 84
End: 2024-07-09
Payer: MEDICARE

## 2024-07-09 VITALS
DIASTOLIC BLOOD PRESSURE: 62 MMHG | BODY MASS INDEX: 28 KG/M2 | TEMPERATURE: 97 F | HEART RATE: 88 BPM | SYSTOLIC BLOOD PRESSURE: 108 MMHG | WEIGHT: 155 LBS

## 2024-07-09 DIAGNOSIS — I42.2 HYPERTROPHIC CARDIOMYOPATHY (HCC): ICD-10-CM

## 2024-07-09 DIAGNOSIS — E11.69 TYPE 2 DIABETES MELLITUS WITH OBESITY (HCC): Primary | ICD-10-CM

## 2024-07-09 DIAGNOSIS — E66.9 TYPE 2 DIABETES MELLITUS WITH OBESITY (HCC): Primary | ICD-10-CM

## 2024-07-09 DIAGNOSIS — E03.8 OTHER SPECIFIED HYPOTHYROIDISM: ICD-10-CM

## 2024-07-09 DIAGNOSIS — F32.A ANXIETY AND DEPRESSION: ICD-10-CM

## 2024-07-09 DIAGNOSIS — E55.9 VITAMIN D DEFICIENCY: ICD-10-CM

## 2024-07-09 DIAGNOSIS — N18.31 STAGE 3A CHRONIC KIDNEY DISEASE (HCC): Chronic | ICD-10-CM

## 2024-07-09 DIAGNOSIS — F41.9 ANXIETY AND DEPRESSION: ICD-10-CM

## 2024-07-09 PROCEDURE — G2211 COMPLEX E/M VISIT ADD ON: HCPCS | Performed by: INTERNAL MEDICINE

## 2024-07-09 PROCEDURE — 1160F RVW MEDS BY RX/DR IN RCRD: CPT | Performed by: INTERNAL MEDICINE

## 2024-07-09 PROCEDURE — 3078F DIAST BP <80 MM HG: CPT | Performed by: INTERNAL MEDICINE

## 2024-07-09 PROCEDURE — 99214 OFFICE O/P EST MOD 30 MIN: CPT | Performed by: INTERNAL MEDICINE

## 2024-07-09 PROCEDURE — 1159F MED LIST DOCD IN RCRD: CPT | Performed by: INTERNAL MEDICINE

## 2024-07-09 PROCEDURE — 3074F SYST BP LT 130 MM HG: CPT | Performed by: INTERNAL MEDICINE

## 2024-07-09 RX ORDER — DAPAGLIFLOZIN 10 MG/1
10 TABLET, FILM COATED ORAL DAILY
COMMUNITY
Start: 2024-07-03

## 2024-07-09 RX ORDER — SPIRONOLACTONE 25 MG/1
12.5 TABLET ORAL DAILY
COMMUNITY
Start: 2024-03-27

## 2024-07-09 RX ORDER — FUROSEMIDE 20 MG/1
TABLET ORAL
COMMUNITY

## 2024-07-09 NOTE — PROGRESS NOTES
Sofiya Martell is a 83 year old female.    Chief Complaint   Patient presents with    Follow - Up     3 month f/u   Discuss medications        HPI:     Patient with hypertrophic cardiomyopathy, HTN, hypothyroidism, anxiety and depression, diabetes type 2 with CKD here for follow up-  She has moved to an apartment in [place].  Her energy has improved since last time we met. Mood is up and down still, thinking about getting a dog  for emotional support. She misses having a pet dog, seemed to help in the past for keeping her active. Her apartment does not allow pets though so she would need a letter from me if decides to get a dog.  Her diabetes is well controlled on metformin and farxiga (latter started by cardiology), hgba1c 6.1. BP also well controlled and LDL at goal on atorvastatin. GFR stable at 55. She has been compliant with levothyroxine 88mcg daily, will check tsh later this month. Weight stable, No CP/palp. Stable REGALADO.  She has been consistent with taking her vit d supplement and level this time was normal at 44.1    Patient Active Problem List   Diagnosis    HTN (hypertension)    Internal hemorrhoids without mention of complication    Hypothyroidism    Anxiety and depression    Unspecified hereditary and idiopathic peripheral neuropathy    Osteopenia    Low vitamin B12 level    Hypertrophic cardiomyopathy (HCC)    Bilateral carpal tunnel syndrome    Type 2 diabetes mellitus with obesity (HCC)    Ventricular tachycardia (HCC)    Vitamin D deficiency    Dyslipidemia    Bilateral carotid artery stenosis    Depression, recurrent (HCC)    COVID-19 virus infection    CKD (chronic kidney disease) stage 3, GFR 30-59 ml/min (HCC)    Chronic heart failure with preserved ejection fraction (HFpEF) (HCC)     Current Outpatient Medications   Medication Sig Dispense Refill    dapagliflozin 10 MG Oral Tab Take 1 tablet (10 mg total) by mouth daily.      spironolactone 25 MG Oral Tab Take 0.5 tablets (12.5 mg  total) by mouth daily.      furosemide 20 MG Oral Tab TAKE 1/2 TABLET BY MOUTH DAILY AS NEEDED FOR SWELLING OR WEIGHT GAIN      VITAMIN D, CHOLECALCIFEROL, OR Take by mouth.      DULoxetine 60 MG Oral Cap DR Particles TAKE 1 CAPSULE(60 MG) BY MOUTH EVERY DAY 90 capsule 3    levothyroxine 88 MCG Oral Tab Take 1 tablet (88 mcg total) by mouth before breakfast. Repeat labs in 2 months 60 tablet 0    metFORMIN 500 MG Oral Tab Take 1 tablet (500 mg total) by mouth daily with breakfast.      Glucose Blood (ONETOUCH VERIO) In Vitro Strip 1 strip by In Vitro route daily. 100 strip 1    OneTouch Delica Lancets 33G Does not apply Misc 1 Lancet by Finger stick route daily as needed. 100 each 1    metoprolol succinate 100 MG Oral Tablet 24 Hr Take 1 tablet (100 mg total) by mouth daily.      atorvastatin 40 MG Oral Tab Take 1 tablet (40 mg total) by mouth daily. 90 tablet 1    Olmesartan Medoxomil 5 MG Oral Tab Take 2 tablets (10 mg total) by mouth daily.      latanoprost (XALATAN) 0.005 % Ophthalmic Solution Place 1 drop into both eyes nightly.  2    Aspirin 81 MG Oral Tab Take 1 tablet (81 mg total) by mouth daily.      diphenhydrAMINE-APAP, sleep,  MG Oral Tab Take 1 tablet by mouth nightly as needed.        Past Medical History:    Arrhythmia    Atherosclerosis of coronary artery    Cardiomyopathy (HCC)    Depression    Disorder of thyroid    Essential hypertension    Glaucoma    High blood pressure    High cholesterol    Hyperlipidemia    Lumbosacral stenosis    Osteoarthritis    Pacemaker    Presence of cardiac defibrillator    Thyroid disease    Visual impairment      Social History:  Social History     Socioeconomic History    Marital status:    Tobacco Use    Smoking status: Former     Types: Cigarettes    Smokeless tobacco: Never    Tobacco comments:     quit 30 years ago as of 9/11/19   Vaping Use    Vaping status: Never Used   Substance and Sexual Activity    Alcohol use: Yes     Comment: Socially     Drug use: No    Sexual activity: Not Currently   Other Topics Concern    Caffeine Concern Yes     Comment: 5-6 cups a day    Exercise No     Comment: walking      Social Determinants of Health     Physical Activity: High Risk (5/15/2024)    Received from [employer]    Exercise Vital Sign     On average, how many days per week do you engage in moderate to strenuous exercise (like a brisk walk)?: 0 days     On average, how many minutes do you engage in exercise at this level?: 0 min    [facility]    Social Connections    Received from [facility]    Housing Stability     Family History   Adopted: Yes   Problem Relation Age of Onset    Heart Disorder Father     Heart Disorder Son     Other (subclavian stenosis) Son         Allergies  Allergies   Allergen Reactions    Adhesive Tape OTHER (SEE COMMENTS)     She developed \"bumps\" at the site       Ace Inhibitors Coughing     CLASS    Latex RASH         REVIEW OF SYSTEMS:   GENERAL HEALTH:  no fevers   RESPIRATORY: no cough  CARDIOVASCULAR: denies chest pain  GI: denies abdominal pain  : no dysuria  NEURO: denies headaches  PSYCH: +depression   HEME: No adenopathy      EXAM:   /62 (BP Location: Left arm, Patient Position: Sitting, Cuff Size: adult)   Pulse 88   Temp 97 °F (36.1 °C) (Temporal)   Wt 155 lb (70.3 kg)   LMP  (LMP Unknown)   BMI 28.35 kg/m²   GENERAL: well developed, well nourished,in no apparent distress  HEENT: atraumatic, normocephalic  NECK: supple,no adenopathy  LUNGS: normal rate without respiratory distress, lungs clear to auscultation  CARDIO: +PM, nl S1 S2, soft SM  GI: normal bowel sounds, soft, NT/ND  EXTREMITIES: no cyanosis, clubbing or edema  NEURO: Alert and oriented    ASSESSMENT AND PLAN:     Encounter Diagnoses   Name     Other specified hypothyroidism- improved energy, stable weight. Check TSH later  this month (she requests to [facility] because she has moved to [place])     Type 2 diabetes mellitus with obesity (HCC)- well controlled with hgba1c of 6.1, she is up to date on eye exam and foot exam     Stage 3a chronic kidney disease (HCC)- stable renal panel     Hypertrophic cardiomyopathy (HCC), CHF with preserved EF- stable, patient started on Farxiga 10mg daily from cardiology     Vitamin D deficiency- resolved, continue vit d supplement          Anxiety and depression- doing better, may consider getting a pet dog again for emotional support    Orders Placed This Encounter   Procedures    TSH W REFLEX TO FREE T4 [33573][Q]       Meds & Refills for this Visit:  Requested Prescriptions      No prescriptions requested or ordered in this encounter       Imaging & Consults:  None    Return in about 4 months (around 11/9/2024), or if symptoms worsen or fail to improve, for follow up chronic issues.  There are no Patient Instructions on file for this visit.      The patient indicates understanding of these issues and agrees to the plan.

## 2024-07-18 LAB — TSH W/REFLEX TO FT4: 0.52 MIU/L (ref 0.4–4.5)

## 2024-07-23 ENCOUNTER — TELEPHONE (OUTPATIENT)
Dept: CARDIOLOGY | Age: 84
End: 2024-07-23

## 2024-07-23 RX ORDER — FUROSEMIDE 20 MG/1
TABLET ORAL
Qty: 90 TABLET | Refills: 1 | Status: SHIPPED | OUTPATIENT
Start: 2024-07-23 | End: 2024-07-23 | Stop reason: SDUPTHER

## 2024-07-24 RX ORDER — LEVOTHYROXINE SODIUM 88 UG/1
88 TABLET ORAL
Qty: 90 TABLET | Refills: 3 | Status: SHIPPED | OUTPATIENT
Start: 2024-07-24

## 2024-07-24 NOTE — TELEPHONE ENCOUNTER
Refill Per Protocol     Requested Prescriptions   Pending Prescriptions Disp Refills    LEVOTHYROXINE 88 MCG Oral Tab [Pharmacy Med Name: LEVOTHYROXINE 0.088MG (88MCG) TAB] 90 tablet 0     Sig: TAKE 1 TABLET(88 MCG) BY MOUTH BEFORE BREAKFAST. REPEAT LABS IN 2 MONTHS       Thyroid Medication Protocol Passed - 7/21/2024  7:35 AM        Passed - TSH in past 12 months        Passed - Last TSH value is normal     Lab Results   Component Value Date    TSH 0.179 05/08/2024    TSHT4 0.52 07/17/2024                 Passed - In person appointment or virtual visit in the past 12 mos or appointment in next 3 mos     Recent Outpatient Visits              2 weeks ago Type 2 diabetes mellitus with obesity (HCC)    [address] Kat Banerjee MD    Office Visit    1 month ago Viral upper respiratory tract infection    [address] Denise Mehta PA-C    Office Visit    4 months ago Encounter for wellness examination    [address] Kat Banerjee MD    Office Visit    8 months ago     [facility]    Nurse Only    8 months ago Carpal tunnel syndrome of right wrist    [facility] Ranjit Kim MD    Office Visit          Future Appointments         Provider Department Appt Notes    In 3 months Kat Banerjee MD [address] [facility] follow up                           Future Appointments         Provider Department Appt Notes    In 3 months Kat Banerjee MD [address] [facility] follow up          Recent Outpatient Visits              2 weeks ago Type 2 diabetes mellitus with obesity (HCC)    [address] Kat Banerjee MD    Office Visit    1 month ago Viral upper respiratory tract infection     [facility], [address] [place], [place] [address]Denise Chavez PA-C    Office Visit    4 months ago Encounter for wellness examination    [facility], [address] [place], [place] [address]Kat Coulter MD    Office Visit    8 months ago     [facility]    Nurse Only    8 months ago Carpal tunnel syndrome of right wrist    [facility], [facility] Ranjit Kim MD    Office Visit

## 2024-07-26 ENCOUNTER — ANCILLARY ORDERS (OUTPATIENT)
Dept: CARDIOLOGY | Age: 84
End: 2024-07-26

## 2024-07-26 ENCOUNTER — ANCILLARY PROCEDURE (OUTPATIENT)
Dept: CARDIOLOGY | Age: 84
End: 2024-07-26
Attending: INTERNAL MEDICINE

## 2024-07-26 DIAGNOSIS — Z95.810 ICD (IMPLANTABLE CARDIOVERTER-DEFIBRILLATOR) IN PLACE: Primary | ICD-10-CM

## 2024-07-26 DIAGNOSIS — Z95.810 ICD (IMPLANTABLE CARDIOVERTER-DEFIBRILLATOR) IN PLACE: ICD-10-CM

## 2024-07-26 LAB
MDC_IDC_LEAD_CONNECTION_STATUS: NORMAL
MDC_IDC_LEAD_CONNECTION_STATUS: NORMAL
MDC_IDC_LEAD_IMPLANT_DT: NORMAL
MDC_IDC_LEAD_IMPLANT_DT: NORMAL
MDC_IDC_LEAD_LOCATION: NORMAL
MDC_IDC_LEAD_LOCATION: NORMAL
MDC_IDC_LEAD_LOCATION_DETAIL_1: NORMAL
MDC_IDC_LEAD_LOCATION_DETAIL_1: NORMAL
MDC_IDC_LEAD_MFG: NORMAL
MDC_IDC_LEAD_MFG: NORMAL
MDC_IDC_LEAD_MODEL: NORMAL
MDC_IDC_LEAD_MODEL: NORMAL
MDC_IDC_LEAD_SERIAL: NORMAL
MDC_IDC_LEAD_SERIAL: NORMAL
MDC_IDC_PG_IMPLANT_DTM: NORMAL
MDC_IDC_PG_MFG: NORMAL
MDC_IDC_PG_MODEL: NORMAL
MDC_IDC_PG_SERIAL: NORMAL
MDC_IDC_PG_TYPE: NORMAL
MDC_IDC_SESS_CLINIC_NAME: NORMAL
MDC_IDC_SESS_TYPE: NORMAL

## 2024-07-26 PROCEDURE — 93296 REM INTERROG EVL PM/IDS: CPT | Performed by: INTERNAL MEDICINE

## 2024-07-26 PROCEDURE — 93295 DEV INTERROG REMOTE 1/2/MLT: CPT | Performed by: INTERNAL MEDICINE

## 2024-07-30 RX ORDER — OLMESARTAN MEDOXOMIL 5 MG/1
10 TABLET ORAL DAILY
Qty: 180 TABLET | Refills: 1 | Status: SHIPPED | OUTPATIENT
Start: 2024-07-30

## 2024-07-30 RX ORDER — ATORVASTATIN CALCIUM 40 MG/1
40 TABLET, FILM COATED ORAL DAILY
Qty: 90 TABLET | Refills: 3 | Status: SHIPPED | OUTPATIENT
Start: 2024-07-30

## 2024-08-15 ENCOUNTER — TELEPHONE (OUTPATIENT)
Dept: CARDIOLOGY | Age: 84
End: 2024-08-15

## 2024-08-16 ENCOUNTER — TELEPHONE (OUTPATIENT)
Dept: INTERNAL MEDICINE CLINIC | Facility: CLINIC | Age: 84
End: 2024-08-16

## 2024-08-16 NOTE — TELEPHONE ENCOUNTER
Received fax to request medication change due to side effects on current prescription, Placed in TB bin for review

## 2024-08-20 ENCOUNTER — TELEPHONE (OUTPATIENT)
Dept: CARDIOLOGY | Age: 84
End: 2024-08-20

## 2024-09-05 ENCOUNTER — TELEPHONE (OUTPATIENT)
Dept: INTERNAL MEDICINE CLINIC | Facility: CLINIC | Age: 84
End: 2024-09-05

## 2024-09-05 NOTE — TELEPHONE ENCOUNTER
I called pt regarding an appt to discuss her medication, Metformin based on paperwork we received from [employer] stating that pt was having diarrhea being on Metformin 500 twice daily. They were suggesting she be on the ER formulation.. Pt's stated they called her and was more concerned about the numbness in her hands, arms and toes. Pt stated this seems to be getting worse. She also found out she has stenosis in her back. She is scheduled for an EMG around 10/1/24 at [facility]. She would like some direction on how to proceed. She is

## 2024-09-05 NOTE — TELEPHONE ENCOUNTER
Called and spoke to pt. Per pt, she has previously discussed this with RN (see 8/16 encounter). Patient taking metformin 500 mg once daily, will occasionally have diarrhea but stated it is not all of the time and is not bothersome. Advised to contact us if this worsens/becomes bothersome and we can explore ER option. Patient stated understanding and agreed to plan.     Patient has had tingling in feet/hands, has seen Dr. Kim and had CT completed which showed stenosis. Had had some on/off back pain. Patient is also on farxiga through cards. Patient unsure if tingling in feet is related to stenosis, or neuropathy. She is scheduled for EMG on 10/1 ordered through [facility]. Dicussed following up with Dr. Kim for tingling/back pain, but pt prefers not to follow-up with Dr. Kim. Offered to ask Dr. Banerjee for other rec, but pt stated she does also follow with ortho through [facility]. Advised to continue with EMG and contact ortho at [facility], pt stated understanding and agreed to plan.

## 2024-09-10 ENCOUNTER — TELEPHONE (OUTPATIENT)
Dept: CARDIOLOGY | Age: 84
End: 2024-09-10

## 2024-09-10 DIAGNOSIS — E66.9 TYPE 2 DIABETES MELLITUS WITH OBESITY (HCC): ICD-10-CM

## 2024-09-10 DIAGNOSIS — E11.69 TYPE 2 DIABETES MELLITUS WITH OBESITY (HCC): ICD-10-CM

## 2024-09-12 NOTE — TELEPHONE ENCOUNTER
Refill Per Protocol     Requested Prescriptions   Pending Prescriptions Disp Refills    METFORMIN 500 MG Oral Tab [Pharmacy Med Name: METFORMIN 500MG TABLETS] 180 tablet 0     Sig: TAKE 1 TABLET(500 MG) BY MOUTH TWICE DAILY WITH MEALS       Diabetes Medication Protocol Passed - 9/10/2024  5:41 AM        Passed - Last A1C < 7.5 and within past 6 months     Lab Results   Component Value Date    A1C 6.1 (H) 06/27/2024             Passed - In person appointment or virtual visit in the past 6 mos or appointment in next 3 mos     Recent Outpatient Visits              2 months ago Type 2 diabetes mellitus with obesity (HCC)    [address] Kat Banerjee MD    Office Visit    3 months ago Viral upper respiratory tract infection    [address] Denise Mehta PA-C    Office Visit    6 months ago Encounter for wellness examination    [address] Kat Banerjee MD    Office Visit    10 months ago     [facility]    Nurse Only    10 months ago Carpal tunnel syndrome of right wrist    [facility] Ranjit Kim MD    Office Visit          Future Appointments         Provider Department Appt Notes    In 2 months Kat Banerjee MD [address] 4[facility] follow up                    Passed - Microalbumin procedure in past 12 months or taking ACE/ARB        Passed - EGFRCR or GFRNAA > 50     GFR Evaluation  EGFRCR: 55 , resulted on 6/27/2024          Passed - GFR in the past 12 months               Future Appointments         Provider Department Appt Notes    In 2 months Kat Banerjee MD [address] 4[facility] follow up          Recent Outpatient Visits              2 months ago Type 2 diabetes mellitus with obesity (HCC)    [place]  [facility], [address] [place] [address], Kat Abdi MD    Office Visit    3 months ago Viral upper respiratory tract infection    [facility], [address] [place] [address], [place]    Office Visit    6 months ago Encounter for wellness examination    [facility], [address] [place] [address] Kat Abdi MD    Office Visit    10 months ago     [facility]    [facility]    10 months ago Carpal tunnel syndrome of right wrist    [facility]    [facility] Visit

## 2024-09-24 ENCOUNTER — TELEPHONE (OUTPATIENT)
Dept: CARDIOLOGY | Age: 84
End: 2024-09-24

## 2024-10-03 ENCOUNTER — TELEPHONE (OUTPATIENT)
Dept: CARDIOLOGY | Age: 84
End: 2024-10-03

## 2024-10-04 ENCOUNTER — TELEPHONE (OUTPATIENT)
Dept: INTERNAL MEDICINE CLINIC | Facility: CLINIC | Age: 84
End: 2024-10-04

## 2024-10-04 NOTE — TELEPHONE ENCOUNTER
Patient called with condition update. She reports continued arm pain and numbness and tingling. She stating she is waiting for EMG results, has MRI neck and back ordered, waiting for insurance to approve. Pain comes and goes, lifting arms makes it worse. She previously received injections in back and arms from neurosurgeon. She takes tylenol PM to help her sleep at night but it doesn't do much for the pain. She took Gabapentin years ago which didn't help with pain.  Recommended patient come in to office to further discuss medication alternatives. Advised patient if pain becomes intolerable or severe to go to ER/IC over the weekend. Patient reluctantly agrees to plan.

## 2024-10-10 ENCOUNTER — E-ADVICE (OUTPATIENT)
Dept: CARDIOLOGY | Age: 84
End: 2024-10-10

## 2024-10-10 ENCOUNTER — TELEPHONE (OUTPATIENT)
Dept: CARDIOLOGY | Age: 84
End: 2024-10-10

## 2024-10-10 DIAGNOSIS — R20.2 NUMBNESS AND TINGLING OF BOTH LOWER EXTREMITIES: ICD-10-CM

## 2024-10-10 DIAGNOSIS — R20.0 NUMBNESS AND TINGLING OF BOTH LOWER EXTREMITIES: ICD-10-CM

## 2024-10-11 ENCOUNTER — NURSE TRIAGE (OUTPATIENT)
Dept: INTERNAL MEDICINE CLINIC | Facility: CLINIC | Age: 84
End: 2024-10-11

## 2024-10-11 ENCOUNTER — TELEPHONE (OUTPATIENT)
Dept: CARDIOLOGY | Age: 84
End: 2024-10-11

## 2024-10-11 ENCOUNTER — TELEPHONE (OUTPATIENT)
Dept: INTERNAL MEDICINE CLINIC | Facility: CLINIC | Age: 84
End: 2024-10-11

## 2024-10-11 ENCOUNTER — E-ADVICE (OUTPATIENT)
Dept: CARDIOLOGY | Age: 84
End: 2024-10-11

## 2024-10-11 DIAGNOSIS — G62.9 NEUROPATHY: Primary | ICD-10-CM

## 2024-10-11 NOTE — TELEPHONE ENCOUNTER
Action Requested: Summary for Provider     []  Critical Lab, Recommendations Needed  [] Need Additional Advice  [x]   FYI    []   Need Orders  [] Need Medications Sent to Pharmacy  []  Other     SUMMARY: Received call form pt. Per pt, she had EMG done for Dr. Blankenship (cardiologist) because she was having numbness/tingling in feet, they thought this may be due to farxiga. Patient unsure what report shows, asking if we can see it. Unable to pull EMG through care everywhere, asked pt to have Dr. Blankenship's office fax report as they will need consent from pt in order to send. Patient said she will call them and ask them to send, she already has a call into their nurses due to pain in arms that feels similar to her feet. Patient unsure if Dr. Blankenship will be able to address pain in arms, advised to set up appointment with us for eval. Patient has had bilateral pain/tingling in arms for 1.5 weeks, pt stated it feels like her arms are burning. Also having weakness bilaterally, stated it is hard to  her coffee cup. Denies swelling/rash/discoloration. Patient did have appointment set up on Monday with Emy Cunningham, but cancelled because she was not going to be able to drive here. Patient wanting appointment with Dr. Banerjee for eval and to discuss EMG results. Next available with Dr. Banerjee 10/17, pt declined sooner appointment with partner as she stated they do not know her hx. Discussed ED vs UC with pt as well, pt stated if sxs worsen, she will go to ED. Appointment also added to waitlist.     CHELITA Banerjee     Reason for call: Acute  Onset: 1.5 weeks       Reason for Disposition   Numbness (i.e., loss of sensation) in hand or fingers   MODERATE pain (e.g., interferes with normal activities) and present > 3 days    Protocols used: Arm Pain-A-OH

## 2024-10-11 NOTE — TELEPHONE ENCOUNTER
Received Neurology procedure for EMG and NCV report from 10//24 visit.  Put in TB bin for review.

## 2024-10-11 NOTE — TELEPHONE ENCOUNTER
Patient went to [facility] on and had an EMG study, she called the office to have it faxed and was told she needs to go to the hospital to get the report. She was also told she can print it via [employer] and send it to us. She states she is not able to do it. Asking if we can see the report on our end? Transferred to the nurse patient is crying and is not feeling well having symptoms to further discuss with the nurse

## 2024-10-17 ENCOUNTER — TELEPHONE (OUTPATIENT)
Dept: ORTHOPEDICS CLINIC | Facility: CLINIC | Age: 84
End: 2024-10-17

## 2024-10-17 ENCOUNTER — TELEPHONE (OUTPATIENT)
Dept: CARDIOLOGY | Age: 84
End: 2024-10-17

## 2024-10-17 ENCOUNTER — OFFICE VISIT (OUTPATIENT)
Dept: INTERNAL MEDICINE CLINIC | Facility: CLINIC | Age: 84
End: 2024-10-17
Payer: MEDICARE

## 2024-10-17 ENCOUNTER — LAB ENCOUNTER (OUTPATIENT)
Dept: LAB | Age: 84
End: 2024-10-17
Attending: INTERNAL MEDICINE
Payer: MEDICARE

## 2024-10-17 VITALS
HEART RATE: 64 BPM | TEMPERATURE: 98 F | HEIGHT: 61.42 IN | DIASTOLIC BLOOD PRESSURE: 58 MMHG | RESPIRATION RATE: 16 BRPM | WEIGHT: 149.63 LBS | OXYGEN SATURATION: 100 % | BODY MASS INDEX: 27.89 KG/M2 | SYSTOLIC BLOOD PRESSURE: 112 MMHG

## 2024-10-17 DIAGNOSIS — E03.8 OTHER SPECIFIED HYPOTHYROIDISM: ICD-10-CM

## 2024-10-17 DIAGNOSIS — E78.5 DYSLIPIDEMIA: ICD-10-CM

## 2024-10-17 DIAGNOSIS — G56.03 BILATERAL CARPAL TUNNEL SYNDROME: ICD-10-CM

## 2024-10-17 DIAGNOSIS — N18.31 STAGE 3A CHRONIC KIDNEY DISEASE (HCC): Chronic | ICD-10-CM

## 2024-10-17 DIAGNOSIS — E11.42 DIABETIC POLYNEUROPATHY ASSOCIATED WITH TYPE 2 DIABETES MELLITUS (HCC): Primary | ICD-10-CM

## 2024-10-17 DIAGNOSIS — I50.32 CHRONIC HEART FAILURE WITH PRESERVED EJECTION FRACTION (HFPEF)  (CMD): ICD-10-CM

## 2024-10-17 DIAGNOSIS — E11.42 DIABETIC POLYNEUROPATHY ASSOCIATED WITH TYPE 2 DIABETES MELLITUS (HCC): ICD-10-CM

## 2024-10-17 DIAGNOSIS — M48.02 CERVICAL STENOSIS OF SPINE: ICD-10-CM

## 2024-10-17 LAB
ALBUMIN SERPL-MCNC: 4.5 G/DL (ref 3.2–4.8)
ALBUMIN/GLOB SERPL: 1.5 {RATIO} (ref 1–2)
ALP LIVER SERPL-CCNC: 91 U/L
ALT SERPL-CCNC: 15 U/L
ANION GAP SERPL CALC-SCNC: 9 MMOL/L (ref 0–18)
AST SERPL-CCNC: 23 U/L (ref ?–34)
BILIRUB SERPL-MCNC: 0.5 MG/DL (ref 0.2–1.1)
BUN BLD-MCNC: 24 MG/DL (ref 9–23)
CALCIUM BLD-MCNC: 10.1 MG/DL (ref 8.7–10.4)
CHLORIDE SERPL-SCNC: 104 MMOL/L (ref 98–112)
CO2 SERPL-SCNC: 24 MMOL/L (ref 21–32)
CREAT BLD-MCNC: 1.1 MG/DL
EGFRCR SERPLBLD CKD-EPI 2021: 50 ML/MIN/1.73M2 (ref 60–?)
EST. AVERAGE GLUCOSE BLD GHB EST-MCNC: 128 MG/DL (ref 68–126)
FASTING STATUS PATIENT QL REPORTED: NO
GLOBULIN PLAS-MCNC: 3.1 G/DL (ref 2–3.5)
GLUCOSE BLD-MCNC: 116 MG/DL (ref 70–99)
HBA1C MFR BLD: 6.1 % (ref ?–5.7)
OSMOLALITY SERPL CALC.SUM OF ELEC: 289 MOSM/KG (ref 275–295)
POTASSIUM SERPL-SCNC: 4.6 MMOL/L (ref 3.5–5.1)
PROT SERPL-MCNC: 7.6 G/DL (ref 5.7–8.2)
SODIUM SERPL-SCNC: 137 MMOL/L (ref 136–145)

## 2024-10-17 PROCEDURE — 1160F RVW MEDS BY RX/DR IN RCRD: CPT | Performed by: INTERNAL MEDICINE

## 2024-10-17 PROCEDURE — 80053 COMPREHEN METABOLIC PANEL: CPT

## 2024-10-17 PROCEDURE — 99214 OFFICE O/P EST MOD 30 MIN: CPT | Performed by: INTERNAL MEDICINE

## 2024-10-17 PROCEDURE — 3008F BODY MASS INDEX DOCD: CPT | Performed by: INTERNAL MEDICINE

## 2024-10-17 PROCEDURE — 3074F SYST BP LT 130 MM HG: CPT | Performed by: INTERNAL MEDICINE

## 2024-10-17 PROCEDURE — G2211 COMPLEX E/M VISIT ADD ON: HCPCS | Performed by: INTERNAL MEDICINE

## 2024-10-17 PROCEDURE — 36415 COLL VENOUS BLD VENIPUNCTURE: CPT

## 2024-10-17 PROCEDURE — 3078F DIAST BP <80 MM HG: CPT | Performed by: INTERNAL MEDICINE

## 2024-10-17 PROCEDURE — 1159F MED LIST DOCD IN RCRD: CPT | Performed by: INTERNAL MEDICINE

## 2024-10-17 PROCEDURE — 83036 HEMOGLOBIN GLYCOSYLATED A1C: CPT

## 2024-10-17 RX ORDER — GABAPENTIN 100 MG/1
100 CAPSULE ORAL NIGHTLY
COMMUNITY
Start: 2024-10-16

## 2024-10-17 RX ORDER — DAPAGLIFLOZIN 10 MG/1
10 TABLET, FILM COATED ORAL DAILY
Qty: 90 TABLET | Refills: 3 | Status: SHIPPED | OUTPATIENT
Start: 2024-10-17

## 2024-10-17 NOTE — TELEPHONE ENCOUNTER
Patient came in to schedule with dr Vasquez for katie carpel tunnel. Please advise if imaging is needed  Future Appointments   Date Time Provider Department Center   12/5/2024 11:40 AM Kat Banerjee MD [facility]   12/16/2024  9:30 AM [id number]

## 2024-10-17 NOTE — PROGRESS NOTES
Sofiya Martell is a 84 year old female.    Chief Complaint   Patient presents with    Arm Pain     ES rm - 3 - INTMT dull increasing bilateral arm pain at a 6/10.    Medication Follow-Up     Neurologist just sent gabapentin 100mg to her pharmacy- not yet started    Diabetes     Patient reports BG in the 90s       HPI:     Pleasant patient with  hypertrophic CM, DM2, HTN, hypothyroidism, CKD, cervical stenosis, CTS here for follow up-  She had EMG done on 10/1/24 which showed evidence of motor sensory predominantly exonal polyneuropathy affecting the lower extremities, no evidence of lumbar radiculopathy. This was ordered by her neurologist Dr. Arturo Leyva from [facility]. Patient was prescribed gabapentin 100mg at bedtime yesterday by neuro but she has not started yet. She is interested in my opinion with regards to the EMG results and starting gabapentin. Besides numbness and tingling in her legs, she also has persistent numbness and weakness in both her hands and arms. She has known CTS along with cervical stenosis. She has tried CSI for CTS but willing to consider surgical release with progressive symptoms. She has been checking BG and typically in the 90s. She had eye exam in April miya De Jesus and no DR found. She is compliant with levothyroxine daily and TSH few months ago was WNL. Renal panel has been stable with last GFR of 55 in June.       Patient Active Problem List   Diagnosis    HTN (hypertension)    Internal hemorrhoids without mention of complication    Hypothyroidism    Anxiety and depression    Unspecified hereditary and idiopathic peripheral neuropathy    Osteopenia    Low vitamin B12 level    Hypertrophic cardiomyopathy (HCC)    Bilateral carpal tunnel syndrome    Type 2 diabetes mellitus with obesity (HCC)    Ventricular tachycardia (HCC)    Vitamin D deficiency    Dyslipidemia    Bilateral carotid artery stenosis    Depression, recurrent (HCC)    COVID-19 virus infection     CKD (chronic kidney disease) stage 3, GFR 30-59 ml/min ([facility])    Chronic heart failure with preserved ejection fraction (HFpEF) ([facility])     Current Outpatient Medications   Medication Sig Dispense Refill    gabapentin 100 MG Oral Cap Take 1 capsule (100 mg total) by mouth nightly.      metFORMIN 500 MG Oral Tab Take 1 tablet (500 mg total) by mouth 2 (two) times daily with meals. 180 tablet 3    levothyroxine 88 MCG Oral Tab Take 1 tablet (88 mcg total) by mouth before breakfast. Repeat labs in 2 months 90 tablet 3    dapagliflozin 10 MG Oral Tab Take 1 tablet (10 mg total) by mouth daily.      spironolactone 25 MG Oral Tab Take 0.5 tablets (12.5 mg total) by mouth daily.      furosemide 20 MG Oral Tab TAKE 1/2 TABLET BY MOUTH DAILY AS NEEDED FOR SWELLING OR WEIGHT GAIN      VITAMIN D, CHOLECALCIFEROL, OR Take by mouth.      DULoxetine 60 MG Oral Cap DR Particles TAKE 1 CAPSULE(60 MG) BY MOUTH EVERY DAY 90 capsule 3    Glucose Blood (ONETOUCH VERIO) In Vitro Strip 1 strip by In Vitro route daily. 100 strip 1    OneTouch Delica Lancets 33G Does not apply Misc 1 Lancet by Finger stick route daily as needed. 100 each 1    metoprolol succinate 100 MG Oral Tablet 24 Hr Take 1 tablet (100 mg total) by mouth daily.      atorvastatin 40 MG Oral Tab Take 1 tablet (40 mg total) by mouth daily. 90 tablet 1    Olmesartan Medoxomil 5 MG Oral Tab Take 2 tablets (10 mg total) by mouth daily.      latanoprost (XALATAN) 0.005 % Ophthalmic Solution Place 1 drop into both eyes nightly.  2    Aspirin 81 MG Oral Tab Take 1 tablet (81 mg total) by mouth daily.      diphenhydrAMINE-APAP, sleep,  MG Oral Tab Take 1 tablet by mouth nightly as needed.        Past Medical History:    Arrhythmia    Atherosclerosis of coronary artery    Cardiomyopathy ([facility])    Depression    Disorder of thyroid    Essential hypertension    Glaucoma    High blood pressure    High cholesterol    Hyperlipidemia    Lumbosacral stenosis    Osteoarthritis     Pacemaker    Presence of cardiac defibrillator    Thyroid disease    Visual impairment      Social History:  Social History     Socioeconomic History    Marital status:    Tobacco Use    Smoking status: Former     Types: Cigarettes    Smokeless tobacco: Never    Tobacco comments:     quit 30 years ago as of 9/11/19   Vaping Use    Vaping status: Never Used   Substance and Sexual Activity    Alcohol use: Yes     Comment: Socially    Drug use: No    Sexual activity: Not Currently   Other Topics Concern    Caffeine Concern Yes     Comment: 5-6 cups a day    Exercise No     Comment: walking      Social Drivers of Health     Physical Activity: High Risk (5/15/2024)    Received from [facility]    Exercise Vital Sign     On average, how many days per week do you engage in moderate to strenuous exercise (like a brisk walk)?: 0 days     On average, how many minutes do you engage in exercise at this level?: 0 min    [facility]    Social Connections    [facility]    Housing Stability     Family History   Adopted: Yes   Problem Relation Age of Onset    Heart Disorder Father     Heart Disorder Son     Other (subclavian stenosis) Son         Allergies  Allergies[1]      REVIEW OF SYSTEMS:   GENERAL HEALTH:  no fevers   RESPIRATORY: no cough  CARDIOVASCULAR: denies chest pain  GI: denies abdominal pain  : no dysuria  NEURO: denies headaches, numbness and tingling in her lower legs and also her arms pia right hand  HEME: No adenopathy      EXAM:   /58 (BP Location: Right arm, Patient Position: Sitting, Cuff Size: adult)   Pulse 64   Temp 97.6 °F (36.4 °C) (Temporal)   Resp 16   Ht 5' 1.42\" (1.56 m)   Wt 149 lb 9.6 oz (67.9 kg)   LMP  (LMP Unknown)   SpO2 100%   BMI 27.88 kg/m²   GENERAL: well developed, well nourished,in no apparent distress  HEENT: atraumatic,  normocephalic  NECK: supple,no adenopathy  LUNGS: normal rate without respiratory distress, lungs clear to auscultation  CARDIO: RRR nl S1 S2  GI: normal bowel sounds, soft, NT/ND  EXTREMITIES: no cyanosis, clubbing or edema  NEURO: Alert and oriented, decreased sensation to soft touch both feet, +tinel's sign b/l, no thenar wasting, decreased  strength R>L hand    ASSESSMENT AND PLAN:     Encounter Diagnoses   Name     Diabetic polyneuropathy associated with type 2 diabetes mellitus (HCC)- reviewed EMG results with her and agree with starting gabapentin 100mg at bedtime (already sent to pharmacy by her neurologist) for polyneuropathy. Discussed dose can be titrated over time. Lower leg symptoms likely r/t DM2     Stage 3a chronic kidney disease (HCC)- stable, push water     Other specified hypothyroidism- TSH normal on levothyroxine 88mcg daily, CPM          Bilateral carpal tunnel syndrome- wrist splints at night, referred to hand ortho      Cervical stenosis of spine- referred for PT, start gabapentin as above        Orders Placed This Encounter   Procedures    Hemoglobin A1C [E]    Comp Metabolic Panel (14)       Meds & Refills for this Visit:  Requested Prescriptions      No prescriptions requested or ordered in this encounter       Imaging & Consults:  ORTHOPEDIC - INTERNAL  OP REFERRAL TO [facility]    Return in about 6 months (around 4/17/2025), or if symptoms worsen or fail to improve, for wellness.  There are no Patient Instructions on file for this visit.      The patient indicates understanding of these issues and agrees to the plan.           [1]   Allergies  Allergen Reactions    Adhesive Tape OTHER (SEE COMMENTS)     She developed \"bumps\" at the site       Ace Inhibitors Coughing     CLASS    Latex RASH

## 2024-10-22 ENCOUNTER — APPOINTMENT (OUTPATIENT)
Dept: CARDIOLOGY | Age: 84
End: 2024-10-22
Attending: INTERNAL MEDICINE

## 2024-10-22 DIAGNOSIS — I50.32 CHRONIC HEART FAILURE WITH PRESERVED EJECTION FRACTION (HFPEF)  (CMD): ICD-10-CM

## 2024-10-22 DIAGNOSIS — I42.2 HYPERTROPHIC CARDIOMYOPATHY  (CMD): ICD-10-CM

## 2024-10-22 LAB
AORTIC VALVE AREA (AVA): 0.48
ASCENDING AORTA (AAD): 3
AV PEAK GRADIENT (AVPG): 7
AV PEAK VELOCITY (AVPV): 1.28
AV STENOSIS SEVERITY TEXT: NORMAL
AVI LVOT PEAK GRADIENT (LVOTMG): 1.2
E WAVE DECELARATION TIME (MDT): 9.14
LEFT INTERNAL DIMENSION IN SYSTOLE (LVSD): 1.6
LEFT VENTRICULAR INTERNAL DIMENSION IN DIASTOLE (LVDD): 2
LEFT VENTRICULAR POSTERIOR WALL IN END DIASTOLE (LVPW): 3
LV EF: NORMAL %
LVOT VTI (LVOTVTI): 0.8
MV E TISSUE VEL MED (MESV): 5.1
MV E WAVE VEL/E TISSUE VEL MED(MSR): 5.22
MV PEAK A VELOCITY (MVPAV): 289
MV PEAK E VELOCITY (MVPEV): 0.87
RV END SYSTOLIC LONGITUDINAL STRAIN FREE WALL (RVGS): 2
TRICUSPID VALVE ANNULAR PEAK VELOCITY (TVAPV): 24
TV ESTIMATED RIGHT ARTERIAL PRESSURE (RAP): 9.08

## 2024-10-22 PROCEDURE — 76376 3D RENDER W/INTRP POSTPROCES: CPT | Performed by: INTERNAL MEDICINE

## 2024-10-22 PROCEDURE — 93306 TTE W/DOPPLER COMPLETE: CPT | Performed by: INTERNAL MEDICINE

## 2024-10-28 ENCOUNTER — OFFICE VISIT (OUTPATIENT)
Dept: ORTHOPEDICS CLINIC | Facility: CLINIC | Age: 84
End: 2024-10-28
Payer: MEDICARE

## 2024-10-28 VITALS — BODY MASS INDEX: 28.13 KG/M2 | HEIGHT: 61 IN | WEIGHT: 149 LBS

## 2024-10-28 DIAGNOSIS — G56.03 BILATERAL CARPAL TUNNEL SYNDROME: Primary | ICD-10-CM

## 2024-10-28 PROCEDURE — 99214 OFFICE O/P EST MOD 30 MIN: CPT | Performed by: PHYSICIAN ASSISTANT

## 2024-10-28 PROCEDURE — 1160F RVW MEDS BY RX/DR IN RCRD: CPT | Performed by: PHYSICIAN ASSISTANT

## 2024-10-28 PROCEDURE — 3008F BODY MASS INDEX DOCD: CPT | Performed by: PHYSICIAN ASSISTANT

## 2024-10-28 PROCEDURE — 1159F MED LIST DOCD IN RCRD: CPT | Performed by: PHYSICIAN ASSISTANT

## 2024-10-28 PROCEDURE — 1126F AMNT PAIN NOTED NONE PRSNT: CPT | Performed by: PHYSICIAN ASSISTANT

## 2024-10-30 ENCOUNTER — TELEPHONE (OUTPATIENT)
Dept: ORTHOPEDICS CLINIC | Facility: CLINIC | Age: 84
End: 2024-10-30

## 2024-10-30 NOTE — H&P
Clinic Note [facility]     Assessment/Plan:  84 year old female    Bilateral carpal tunnel syndrome-EMG/NCV confirmed moderate, left worse than right-she is failed all conservative treatment management with bracing and cortisone injections.  We discussed the need for a carpal tunnel release.  Patient does have concurrent diagnosis of cervical stenosis and diabetes.  She does have upper shoulder pain that I am unsure will go away completely with carpal tunnel release.  I explained that she may be left with some numbness given his other diagnosis and she voiced understanding of that.  I do think given her entire history she will do well with a carpal tunnel release especially her nighttime symptoms.  We discussed surgery and recovery time.  Cervical stenosis currently being worked up by another provider.  Diabetes mellitus type 2 current A1C of 6.1    Follow Up: To meet with surgeon for surgical discussion    Diagnostic Studies:  No new x-rays today    Physical Exam:    Ht 5' 1\" (1.549 m)   Wt 149 lb (67.6 kg)   LMP  (LMP Unknown)   BMI 28.15 kg/m²     Constitutional: NAD. AOx3. Well-developed and Well-nourished.   Psychiatric: Normal mood/ affect/ behavior. Judgment and thought content normal.     Bile upper Extremity:   Inspection: Skin Intact. No skin lesions. No gross deformity.   Palpation: No focal areas of tenderness   Motion: Elbow: normal bilateral symmetric ext/flex  Wrist: normal bilateral symmetric ext/flex/sup/pro  Finger: full composite fist   Special Tests:        Median Nerve Exam Right Left   Phdurkan + +   Sensation normal normal   PCBr Sensation normal normal               Ulnar Nerve Exam Right Left   Tinels neg neg   EF neg neg   Hypermobility @ elbow neg neg   Sensation normal normal   1st YING Weakness No No   Hypothenar Atrophy No No     Radial Nerve Exam  Right Left   Radial Sensory normal normal       CC: My hand goes numb    HPI: This 84 year old right-handed female presents with  numbness and tingling into her hand.  She has previously been diagnosed with carpal tunnel several years ago.  She did have an EMG which confirmed carpal tunnel bilaterally.  She has been given several injections to the carpal tunnel over many years as she did not want to have surgery.  She did state that cortisone injections has helped her in the past with the symptoms. She does have pain from the wrist up to the shoulder.  She does have numbness and tingling that bothers her at night and she has to shake out her hands for relief.  She does have weakness with fine motor skills.  She is currently being worked up for cervical stenosis and has been recommended an injection to the neck.    Past Medical History:    Arrhythmia    Atherosclerosis of coronary artery    Cardiomyopathy (HCC)    Depression    Disorder of thyroid    Essential hypertension    Glaucoma    High blood pressure    High cholesterol    Hyperlipidemia    Lumbosacral stenosis    Osteoarthritis    Pacemaker    Presence of cardiac defibrillator    Thyroid disease    Visual impairment     Past Surgical History:   Procedure Laterality Date    Cataract      Colonoscopy      Pacemaker/defibrillator  05/16/2018    [facility]     Current Outpatient Medications   Medication Sig Dispense Refill    gabapentin 100 MG Oral Cap Take 1 capsule (100 mg total) by mouth nightly.      metFORMIN 500 MG Oral Tab Take 1 tablet (500 mg total) by mouth 2 (two) times daily with meals. 180 tablet 3    levothyroxine 88 MCG Oral Tab Take 1 tablet (88 mcg total) by mouth before breakfast. Repeat labs in 2 months 90 tablet 3    dapagliflozin 10 MG Oral Tab Take 1 tablet (10 mg total) by mouth daily.      spironolactone 25 MG Oral Tab Take 0.5 tablets (12.5 mg total) by mouth daily.      furosemide 20 MG Oral Tab TAKE 1/2 TABLET BY MOUTH DAILY AS NEEDED FOR SWELLING OR WEIGHT GAIN      VITAMIN D, CHOLECALCIFEROL, OR Take by mouth.      DULoxetine 60 MG Oral Cap DR Particles TAKE 1  CAPSULE(60 MG) BY MOUTH EVERY DAY 90 capsule 3    Glucose Blood (ONETOUCH VERIO) In Vitro Strip 1 strip by In Vitro route daily. 100 strip 1    OneTouch Delica Lancets 33G Does not apply Misc 1 Lancet by Finger stick route daily as needed. 100 each 1    metoprolol succinate 100 MG Oral Tablet 24 Hr Take 1 tablet (100 mg total) by mouth daily.      atorvastatin 40 MG Oral Tab Take 1 tablet (40 mg total) by mouth daily. 90 tablet 1    Olmesartan Medoxomil 5 MG Oral Tab Take 2 tablets (10 mg total) by mouth daily.      latanoprost (XALATAN) 0.005 % Ophthalmic Solution Place 1 drop into both eyes nightly.  2    Aspirin 81 MG Oral Tab Take 1 tablet (81 mg total) by mouth daily.      diphenhydrAMINE-APAP, sleep,  MG Oral Tab Take 1 tablet by mouth nightly as needed.       Allergies[1]  Family History   Adopted: Yes   Problem Relation Age of Onset    Heart Disorder Father     Heart Disorder Son     Other (subclavian stenosis) Son      Social History     Occupational History    Not on file   Tobacco Use    Smoking status: Former     Types: Cigarettes    Smokeless tobacco: Never    Tobacco comments:     quit 30 years ago as of 9/11/19   Vaping Use    Vaping status: Never Used   Substance and Sexual Activity    Alcohol use: Yes     Comment: Socially    Drug use: No    Sexual activity: Not Currently        Review of Systems (negative unless bolded):  General: fevers, chills, fatigue  CV:  chest pain, palpitations, leg swelling  Msk: bodyaches, neck pain, neck stiffness  Skin: rashes, open wounds, nonhealing ulcers  Hem: bleeds easily, bruise easily, immunocompromised  Neuro: dizziness, light headedness, headaches  Psych: anxious, depressed, anger issues       Ashely Kruger PA-C  Hand, Wrist, & Elbow Surgery  Physician Assistant to Dr. Mynor [email or web address]  t: [phone]  f: [phone]         [1]   Allergies  Allergen Reactions    Adhesive Tape OTHER (SEE COMMENTS)     She developed \"bumps\" at  the site       Ace Inhibitors Coughing     CLASS    Latex RASH

## 2024-11-01 ENCOUNTER — ANCILLARY ORDERS (OUTPATIENT)
Dept: CARDIOLOGY | Age: 84
End: 2024-11-01

## 2024-11-01 ENCOUNTER — ANCILLARY PROCEDURE (OUTPATIENT)
Dept: CARDIOLOGY | Age: 84
End: 2024-11-01
Attending: INTERNAL MEDICINE

## 2024-11-01 ENCOUNTER — TELEPHONE (OUTPATIENT)
Dept: CARDIOLOGY | Age: 84
End: 2024-11-01

## 2024-11-01 DIAGNOSIS — Z95.810 ICD (IMPLANTABLE CARDIOVERTER-DEFIBRILLATOR) IN PLACE: ICD-10-CM

## 2024-11-01 DIAGNOSIS — Z95.810 ICD (IMPLANTABLE CARDIOVERTER-DEFIBRILLATOR) IN PLACE: Primary | ICD-10-CM

## 2024-11-01 PROCEDURE — 93296 REM INTERROG EVL PM/IDS: CPT | Performed by: INTERNAL MEDICINE

## 2024-11-01 PROCEDURE — 93295 DEV INTERROG REMOTE 1/2/MLT: CPT | Performed by: INTERNAL MEDICINE

## 2024-11-05 ENCOUNTER — APPOINTMENT (OUTPATIENT)
Dept: CARDIOLOGY | Age: 84
End: 2024-11-05
Attending: INTERNAL MEDICINE

## 2024-11-05 ENCOUNTER — E-ADVICE (OUTPATIENT)
Dept: CARDIOLOGY | Age: 84
End: 2024-11-05

## 2024-11-05 ENCOUNTER — TELEPHONE (OUTPATIENT)
Dept: CARDIOLOGY | Age: 84
End: 2024-11-05

## 2024-11-05 ENCOUNTER — TELEPHONE (OUTPATIENT)
Dept: ORTHOPEDICS CLINIC | Facility: CLINIC | Age: 84
End: 2024-11-05

## 2024-11-05 ENCOUNTER — OFFICE VISIT (OUTPATIENT)
Facility: CLINIC | Age: 84
End: 2024-11-05
Payer: MEDICARE

## 2024-11-05 DIAGNOSIS — G56.01 CARPAL TUNNEL SYNDROME ON RIGHT: Primary | ICD-10-CM

## 2024-11-05 DIAGNOSIS — G56.03 BILATERAL CARPAL TUNNEL SYNDROME: Primary | ICD-10-CM

## 2024-11-05 PROCEDURE — 1159F MED LIST DOCD IN RCRD: CPT | Performed by: STUDENT IN AN ORGANIZED HEALTH CARE EDUCATION/TRAINING PROGRAM

## 2024-11-05 PROCEDURE — 99214 OFFICE O/P EST MOD 30 MIN: CPT | Performed by: STUDENT IN AN ORGANIZED HEALTH CARE EDUCATION/TRAINING PROGRAM

## 2024-11-05 PROCEDURE — 1160F RVW MEDS BY RX/DR IN RCRD: CPT | Performed by: STUDENT IN AN ORGANIZED HEALTH CARE EDUCATION/TRAINING PROGRAM

## 2024-11-05 RX ORDER — DAPAGLIFLOZIN 10 MG/1
10 TABLET, FILM COATED ORAL DAILY
Qty: 90 TABLET | Refills: 3 | Status: SHIPPED | OUTPATIENT
Start: 2024-11-05

## 2024-11-05 NOTE — PROGRESS NOTES
Clinic Note        Allergies[1]    CC: bilateral carpal tunnel syndrome    HPI: This 84 year old RHD female presents with numbness and tingling in bilateral hands. She has a lengthy several-year history of this and underwent an EMG in 2019 which confirmed bilateral carpal tunnel. In addition she has been managed with steroid injections to the bilateral carpal tunnels with good relief for several years. 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 In addition, she has pain in the entire extremity from the shoulder down to her forearm. She has weakness gripping and holding onto objects. She has a history of cervical stenosis and was recommended a steroid injection for the neck by another provider, but she has deferred for now. The pain and numbness in her fingers, primarily the thumb/index/middle finger of both hands but occasionally all fingers, bother her on a daily basis and wakes her up at night. She recently saw Ashely Kruger and was referred to me for consideration of surgical carpal tunnel release. Right now, her right sided symptoms bother her more than her left.     Past Medical History:    Arrhythmia    Atherosclerosis of coronary artery    Cardiomyopathy (HCC)    Depression    Disorder of thyroid    Essential hypertension    Glaucoma    High blood pressure    High cholesterol    Hyperlipidemia    Lumbosacral stenosis    Osteoarthritis    Pacemaker    Presence of cardiac defibrillator    Thyroid disease    Visual impairment     Past Surgical History:   Procedure Laterality Date    Cataract      Colonoscopy      Pacemaker/defibrillator  05/16/2018    [facility]     Current Outpatient Medications   Medication Sig Dispense Refill    gabapentin 100 MG Oral Cap Take 1 capsule (100 mg total) by mouth nightly.      metFORMIN 500 MG Oral Tab Take 1 tablet (500 mg total) by mouth 2 (two) times daily with meals.  180 tablet 3    levothyroxine 88 MCG Oral Tab Take 1 tablet (88 mcg total) by mouth before breakfast. Repeat labs in 2 months 90 tablet 3    dapagliflozin 10 MG Oral Tab Take 1 tablet (10 mg total) by mouth daily.      spironolactone 25 MG Oral Tab Take 0.5 tablets (12.5 mg total) by mouth daily.      furosemide 20 MG Oral Tab TAKE 1/2 TABLET BY MOUTH DAILY AS NEEDED FOR SWELLING OR WEIGHT GAIN      VITAMIN D, CHOLECALCIFEROL, OR Take by mouth.      DULoxetine 60 MG Oral Cap DR Particles TAKE 1 CAPSULE(60 MG) BY MOUTH EVERY DAY 90 capsule 3    Glucose Blood (ONETOUCH VERIO) In Vitro Strip 1 strip by In Vitro route daily. 100 strip 1    OneTouch Delica Lancets 33G Does not apply Misc 1 Lancet by Finger stick route daily as needed. 100 each 1    metoprolol succinate 100 MG Oral Tablet 24 Hr Take 1 tablet (100 mg total) by mouth daily.      atorvastatin 40 MG Oral Tab Take 1 tablet (40 mg total) by mouth daily. 90 tablet 1    Olmesartan Medoxomil 5 MG Oral Tab Take 2 tablets (10 mg total) by mouth daily.      latanoprost (XALATAN) 0.005 % Ophthalmic Solution Place 1 drop into both eyes nightly.  2    Aspirin 81 MG Oral Tab Take 1 tablet (81 mg total) by mouth daily.      diphenhydrAMINE-APAP, sleep,  MG Oral Tab Take 1 tablet by mouth nightly as needed.       Family History   Adopted: Yes   Problem Relation Age of Onset    Heart Disorder Father     Heart Disorder Son     Other (subclavian stenosis) Son      Social History     Occupational History    Not on file   Tobacco Use    Smoking status: Former     Types: Cigarettes    Smokeless tobacco: Never    Tobacco comments:     quit 30 years ago as of 9/11/19   Vaping Use    Vaping status: Never Used   Substance and Sexual Activity    Alcohol use: Yes     Comment: Socially    Drug use: No    Sexual activity: Not Currently        Review of Systems:  Negative unless stated in HPI    Physical Exam:    LMP  (LMP Unknown)     Constitutional: NAD. AOx3. Well-developed and  Well-nourished.   Psychiatric: Normal mood/ affect/ behavior. Judgment and thought content normal.     Bilateral Upper Extremity:   Inspection: Skin Intact. No skin lesions. No gross deformity.   Palpation:  No focal areas of TTP   Motion: Elbow: normal bilateral symmetric ext/flex  Wrist: normal bilateral symmetric ext/flex/sup/pro  Finger: full composite fist   Vascular 2+ radial pulse       Median Nerve Exam Right Left   Phdurkan + +   Tinel's at Wrist + +   Sensation normal normal   PCBr Sensation normal normal   APB Weakness Yes Yes   Thenar Atrophy Yes Yes   FDP to index finger weakness Yes Yes     CTS-6 Evaluation Tool  Numbness predominantly or exclusively in median nerve territory  3.5 / (3.5) points  Nocturnal symptoms       4 / (4) points  Thenar atrophy and/or weakness      5 / (5) points  Positive Phalen's test       5 / (5) points  Loss of 2 point discrimination (>5mm)     4.5 / (4.5) points  Positive Tinel Sign       4 / (4) points            Total: 26 / (26) points      Ulnar Nerve Exam Right Left   Sensation normal normal   Tinel's at Medial Elbow neg neg   Elbow Flexion, Nerve Compression Testing neg neg   1st YING Weakness No No   Hypothenar Atrophy Yes Yes   FDP to small finger weakness Yes Yes     Radial Nerve Exam  Right Left   Radial Sensory normal normal     2-point sensation is diminished in all digits, >10mm.     Diagnostic Studies:    EMG/NCV from 2019 at OSH reveals bilateral moderate carpal tunnel syndrome.     Assessment/Plan:  84 year old female with bilateral carpal tunnel syndrome.    I reviewed the patient's electronic medical record, including the pertinent office notes, medical/surgical history, medications and images. I specifically reviewed the images noted above, independently and discussed my independent interpretation of these images in combination with the pertinent positive and negative findings in my clinical exam with the patient.    bilateral carpal tunnel syndrome    We  had an extensive discussion today regarding the nature of a presumptive compressive neuropathy, and specifically the nature of carpal tunnel syndrome including signs and symptoms of the condition, general considerations for causation and progression of the condition, and strategies for treatment. The nature of progressive nerve dysfunction including reversible and irreversible changes have been discussed, along with the influence of timing on treatment considerations and prognosis for recovery. The potential for other or secondary sources of nerve compression or other nerve involvement have been reviewed. The indications and nature of surgical versus nonsurgical options have been discussed, as well as the inherent risks and benefits of each treatment. The patient verbalizes understanding of our discussion.     We discussed at length that the patient may have a component of double crush syndrome or additional explanations for her symptoms, but given the EMG/NCV confirmed carpal tunnel syndrome diagnosis as well as her clinical exam I do believe it is reasonable to proceed with carpal tunnel release surgery. She may not achieve full relief of all of her symptoms but I am hopeful she will experience some improvement. She understood all of this.     2. Cervical stenosis - patient is observing this for now and following with another provider    3. DMII - current A1c = 6.1    Follow Up: for surgery - plan for right endoscopic carpal tunnel release, possible open; patient would like to begin with the right side, recover, and then proceed with the left side at a later date    Lm Gallegos MD   Hand, Wrist, & Elbow Surgery  [email or web address]       [1]   Allergies  Allergen Reactions    Adhesive Tape OTHER (SEE COMMENTS)     She developed \"bumps\" at the site       Ace Inhibitors Coughing     CLASS    Latex RASH

## 2024-11-05 NOTE — TELEPHONE ENCOUNTER
Date of Surgery:    2024 [facility]    Post Op Appt:  2024 Aaron BANERJEE    Case ID:     Notes:             SURGICAL BOOKING SHEET   Name: Sofiya Martell  MRN: [id number]   : 1940     Surgical Date:    TBD location, patient would like surgery this month (Nov)   Surgical Consent:    right endoscopic carpal tunnel release, possible open   Diagnosis:     right carpal tunnel syndrome   Workers Comp: No   Procedure Codes:    Endoscopic carpal tunnel release (CPT 11895)   Disposition:    Outpatient   Operative Time:    30 mins   Antibiotics:    None   Anesthesia Type:    Monitored Anesthesia Care (MAC) + Regional - Supraclavicular   Clearance:     MEDICAL CLEARANCE   Equipment:    ECTR: *Please see Dr. Gallegos ECTR Pref Card* [place], Microaire Endoscopic System, Upper Arm 18-inch tourniquet, Local Pre-Mix (1% lidocaine w/ epi, 0.5% marcaine, 50/50 mix and 20cc total) , 4-0 moncryl P3, Dermabond, Telfa x2 +Tegaderm x2. Standby: Lead Hand   Assistant:    Assistant Surgery: No   Follow Up:    Follow up 10 days after surgery   Pain Medication:    OTC   Therapy:    None

## 2024-11-05 NOTE — PROGRESS NOTES
SURGICAL BOOKING SHEET   Name: Sofiya Martell  MRN: [id number]   : 1940    Surgical Date:   TBD location, patient would like surgery this month (Nov)   Surgical Consent:   right endoscopic carpal tunnel release, possible open   Diagnosis:    right carpal tunnel syndrome   Workers Comp: No   Procedure Codes:   Endoscopic carpal tunnel release (CPT 75786)   Disposition:   Outpatient   Operative Time:   30 mins   Antibiotics:   None   Anesthesia Type:   Monitored Anesthesia Care (MAC) + Regional - Supraclavicular   Clearance:    MEDICAL CLEARANCE   Equipment:   ECTR: *Please see Dr. Gallegos ECTR Pref Card* [place] Blade, Microaire Endoscopic System, Upper Arm 18-inch tourniquet, Local Pre-Mix (1% lidocaine w/ epi, 0.5% marcaine, 50/50 mix and 20cc total) , 4-0 moncryl P3, Dermabond, Telfa x2 +Tegaderm x2. Standby: Lead Hand   Assistant:   Assistant Surgery: No   Follow Up:   Follow up 10 days after surgery   Pain Medication:   OTC   Therapy:   None

## 2024-11-07 NOTE — TELEPHONE ENCOUNTER
Per [facility] Patient can't have surgery there.  Spoke with patient to let her know. I will call back once we have a spot for her at [facility]

## 2024-11-07 NOTE — TELEPHONE ENCOUNTER
Spoke with patient and she will check to see when she can get in to see PCP. Patient will call me back to let me know. Base on that information we will schedule surgery

## 2024-11-08 ENCOUNTER — TELEPHONE (OUTPATIENT)
Dept: INTERNAL MEDICINE CLINIC | Facility: CLINIC | Age: 84
End: 2024-11-08

## 2024-11-08 NOTE — TELEPHONE ENCOUNTER
Patient scheduled with Dr. Kat Banerjee below for Carpal tunnel surgery with Dr. Gallegos.  Patient's surgery was cancelled at the [facility], per Félix in Orthopedic. Félix stated that facility wasn't the best for patient and he will check with DR. Gallegos and f/u with our office and on when patient's surgery will be rescheduled for - they are trying to get it booked for at the hospital.    Félix and patient will call back on Monday at 12noon to determine when surgery will be rescheduled for.    Future Appointments   Date Time Provider Department Center   11/11/2024  3:00 PM Kat Banereje MD [facility]

## 2024-11-11 ENCOUNTER — OFFICE VISIT (OUTPATIENT)
Dept: INTERNAL MEDICINE CLINIC | Facility: CLINIC | Age: 84
End: 2024-11-11
Payer: MEDICARE

## 2024-11-11 ENCOUNTER — ANESTHESIA EVENT (OUTPATIENT)
Dept: SURGERY | Facility: HOSPITAL | Age: 84
End: 2024-11-11
Payer: MEDICARE

## 2024-11-11 VITALS
TEMPERATURE: 98 F | RESPIRATION RATE: 20 BRPM | OXYGEN SATURATION: 97 % | DIASTOLIC BLOOD PRESSURE: 80 MMHG | HEART RATE: 70 BPM | BODY MASS INDEX: 29.19 KG/M2 | SYSTOLIC BLOOD PRESSURE: 122 MMHG | WEIGHT: 154.63 LBS | HEIGHT: 61 IN

## 2024-11-11 DIAGNOSIS — I10 PRIMARY HYPERTENSION: ICD-10-CM

## 2024-11-11 DIAGNOSIS — E66.9 TYPE 2 DIABETES MELLITUS WITH OBESITY (HCC): ICD-10-CM

## 2024-11-11 DIAGNOSIS — I42.2 HYPERTROPHIC CARDIOMYOPATHY (HCC): ICD-10-CM

## 2024-11-11 DIAGNOSIS — N18.31 STAGE 3A CHRONIC KIDNEY DISEASE (HCC): Chronic | ICD-10-CM

## 2024-11-11 DIAGNOSIS — F32.A ANXIETY AND DEPRESSION: ICD-10-CM

## 2024-11-11 DIAGNOSIS — E78.5 DYSLIPIDEMIA: ICD-10-CM

## 2024-11-11 DIAGNOSIS — Z01.818 PREOP EXAMINATION: Primary | ICD-10-CM

## 2024-11-11 DIAGNOSIS — E11.69 TYPE 2 DIABETES MELLITUS WITH OBESITY (HCC): ICD-10-CM

## 2024-11-11 DIAGNOSIS — F41.9 ANXIETY AND DEPRESSION: ICD-10-CM

## 2024-11-11 DIAGNOSIS — G56.03 BILATERAL CARPAL TUNNEL SYNDROME: ICD-10-CM

## 2024-11-11 DIAGNOSIS — E03.8 OTHER SPECIFIED HYPOTHYROIDISM: ICD-10-CM

## 2024-11-11 LAB
ATRIAL RATE: 62 BPM
P AXIS: 32 DEGREES
P-R INTERVAL: 196 MS
Q-T INTERVAL: 464 MS
QRS DURATION: 80 MS
QTC CALCULATION (BEZET): 470 MS
R AXIS: -3 DEGREES
T AXIS: 74 DEGREES
VENTRICULAR RATE: 62 BPM

## 2024-11-11 RX ORDER — GABAPENTIN 100 MG/1
200 CAPSULE ORAL 3 TIMES DAILY
Qty: 180 CAPSULE | Refills: 2 | Status: SHIPPED | OUTPATIENT
Start: 2024-11-11

## 2024-11-11 NOTE — TELEPHONE ENCOUNTER
Date of Surgery: 11/13/2024   [facility]    Post Op Appt:  11/25/2024 1130    Case ID: [id number]     Notes:

## 2024-11-11 NOTE — TELEPHONE ENCOUNTER
Per Félix in Orthopedic, Dr. Gallegos's office - patient should keep pre-op appointment today, they will be sure to have surgery within the 30days of the clearance note.  Patient scheduled for today  Future Appointments   Date Time Provider Department Center   11/11/2024  3:00 PM Kat Banerjee MD [facility]

## 2024-11-11 NOTE — PROGRESS NOTES
Sofiya Martell is a 84 year old female.    Chief Complaint   Patient presents with    Pre-Op Exam     Pre op rm 4 yb       HPI:     Patient with hypertrophic cardiomyopathy, s/p ICD, CHF, HTN, HL, hypothyroidism, anxiety and depression, diabetes type 2 with CKD, carpal tunnel syndrome here for pre op exam for right endoscopic carpal tunnel release, possible open on 11/13/24 requested by Dr. Gallegos. Patient follows with cardiologist Dr. Blankenship, she has no acute cardiac complaints including chest pains or shortness of breath. ROS negative for fevers, chills, cough, congestion, abdominal pain, nausea, vomiting, focal weakness, or abnormal bleeding.     Patient Active Problem List   Diagnosis    HTN (hypertension)    Internal hemorrhoids without mention of complication    Hypothyroidism    Anxiety and depression    Unspecified hereditary and idiopathic peripheral neuropathy    Osteopenia    Low vitamin B12 level    Hypertrophic cardiomyopathy (HCC)    Bilateral carpal tunnel syndrome    Type 2 diabetes mellitus with obesity (HCC)    Ventricular tachycardia (HCC)    Vitamin D deficiency    Dyslipidemia    Bilateral carotid artery stenosis    Depression, recurrent (HCC)    COVID-19 virus infection    CKD (chronic kidney disease) stage 3, GFR 30-59 ml/min (HCC)    Chronic heart failure with preserved ejection fraction (HFpEF) (HCC)     Current Outpatient Medications   Medication Sig Dispense Refill    gabapentin 100 MG Oral Cap Take 2 capsules (200 mg total) by mouth 3 (three) times daily. 180 capsule 2    metFORMIN 500 MG Oral Tab Take 1 tablet (500 mg total) by mouth 2 (two) times daily with meals. 180 tablet 3    levothyroxine 88 MCG Oral Tab Take 1 tablet (88 mcg total) by mouth before breakfast. Repeat labs in 2 months 90 tablet 3    dapagliflozin 10 MG Oral Tab Take 1 tablet (10 mg total) by mouth daily.      spironolactone 25 MG Oral Tab Take 0.5 tablets (12.5 mg total) by mouth daily.      furosemide 20 MG  Oral Tab TAKE 1/2 TABLET BY MOUTH DAILY AS NEEDED FOR SWELLING OR WEIGHT GAIN      VITAMIN D, CHOLECALCIFEROL, OR Take by mouth.      DULoxetine 60 MG Oral Cap DR Particles TAKE 1 CAPSULE(60 MG) BY MOUTH EVERY DAY 90 capsule 3    Glucose Blood (ONETOUCH VERIO) In Vitro Strip 1 strip by In Vitro route daily. 100 strip 1    metoprolol succinate 100 MG Oral Tablet 24 Hr Take 1 tablet (100 mg total) by mouth daily.      atorvastatin 40 MG Oral Tab Take 1 tablet (40 mg total) by mouth daily. 90 tablet 1    Olmesartan Medoxomil 5 MG Oral Tab Take 2 tablets (10 mg total) by mouth daily.      latanoprost (XALATAN) 0.005 % Ophthalmic Solution Place 1 drop into both eyes nightly.  2    Aspirin 81 MG Oral Tab Take 1 tablet (81 mg total) by mouth daily.      diphenhydrAMINE-APAP, sleep,  MG Oral Tab Take 1 tablet by mouth nightly as needed.        Past Medical History:    Arrhythmia    Atherosclerosis of coronary artery    Cardiomyopathy (HCC)    Congestive heart disease (HCC)    Depression    Diabetes (HCC)    Disorder of thyroid    Diverticulosis of large intestine    Essential hypertension    Glaucoma    High blood pressure    High cholesterol    Hyperlipidemia    Lumbosacral stenosis    Osteoarthritis    Pacemaker    Presence of cardiac defibrillator    Renal disorder    CKD    Thyroid disease    Visual impairment      Social History:  Social History     Socioeconomic History    Marital status:    Tobacco Use    Smoking status: Former     Types: Cigarettes    Smokeless tobacco: Never    Tobacco comments:     quit 30 years ago as of 9/11/19   Vaping Use    Vaping status: Never Used   Substance and Sexual Activity    Alcohol use: Not Currently     Comment: rare    Drug use: No    Sexual activity: Not Currently   Other Topics Concern    Caffeine Concern Yes     Comment: 5-6 cups a day    Exercise No     Comment: walking      Social Drivers of Health     Physical Activity: High Risk (5/15/2024)    Received from  [facility]    Exercise Vital Sign     On average, how many days per week do you engage in moderate to strenuous exercise (like a brisk walk)?: 0 days     On average, how many minutes do you engage in exercise at this level?: 0 min    [facility], [facility]    Social Connections    Received from [facility], [facility]    Housing Stability     Family History   Adopted: Yes   Problem Relation Age of Onset    Heart Disorder Father     Heart Disorder Son     Other (subclavian stenosis) Son         Allergies  Allergies[1]      REVIEW OF SYSTEMS:   GENERAL HEALTH:  no fevers   RESPIRATORY: no cough  CARDIOVASCULAR: denies chest pain  GI: denies abdominal pain  : no dysuria  HEME: No bleeding  MS: right hand with numbness, tingling and weakness      EXAM:   /80   Pulse 70   Temp 98 °F (36.7 °C) (Temporal)   Resp 20   Ht 5' 1\" (1.549 m)   Wt 154 lb 9.6 oz (70.1 kg)   LMP  (LMP Unknown)   SpO2 97%   BMI 29.21 kg/m²   GENERAL: well developed, well nourished,in no apparent distress  HEENT: atraumatic, normocephalic  NECK: supple,no adenopathy  LUNGS: normal rate without respiratory distress, lungs clear to auscultation  CARDIO: RRR nl S1 S2  GI: normal bowel sounds, soft, NT/ND  EXTREMITIES: no cyanosis, clubbing or edema  NEURO: Alert and oriented    EKG- NSR, possible left atrial enlargement, borderline EKG  ASSESSMENT AND PLAN:     Encounter Diagnoses   Name     Preop examination- Patient's chronic medical problems are stable. She follows with Dr. Blankenship for cardiomyopathy and EKG today with no acute abnormalities. She is medically cleared for carpal tunnel surgery without the need for further work up at this time. Please call if any questions.     Bilateral carpal tunnel syndrome-  plan for right endoscopic carpal tunnel release, possible open on 11/13/24  by Dr. Gallegos     Hypertrophic cardiomyopathy  (HCC)- stable, s/p ICD, no acute cardiac complaints     Type 2 diabetes mellitus with obesity ([facility])- BG controlled, hgba1c 6.1 last month     Primary hypertension- well controlled, CPM     Dyslipidemia- controlled on atorvastatin     Other specified hypothyroidism- stable on levothyroxine      Anxiety and depression- stable on duloxetine, she also does therapy     Stage 3a chronic kidney disease (HCC)- stable, will monitor renal panel q3-6 months        No orders of the defined types were placed in this encounter.      Meds & Refills for this Visit:  Requested Prescriptions     Signed Prescriptions Disp Refills    gabapentin 100 MG Oral Cap 180 capsule 2     Sig: Take 2 capsules (200 mg total) by mouth 3 (three) times daily.       Imaging & Consults:  ELECTROCARDIOGRAM, COMPLETE    No follow-ups on file.  There are no Patient Instructions on file for this visit.      The patient indicates understanding of these issues and agrees to the plan.           [1]   Allergies  Allergen Reactions    Adhesive Tape OTHER (SEE COMMENTS)     She developed \"bumps\" at the site       Ace Inhibitors Coughing     CLASS    Latex RASH

## 2024-11-12 ENCOUNTER — TELEPHONE (OUTPATIENT)
Dept: INTERNAL MEDICINE CLINIC | Facility: CLINIC | Age: 84
End: 2024-11-12

## 2024-11-12 NOTE — TELEPHONE ENCOUNTER
Patient is cleared by PCP for surgery. Office visit 11/11/2024 in [facility]

## 2024-11-12 NOTE — TELEPHONE ENCOUNTER
RESCHEDULED TO:    Date of Surgery:    12/6/2024    Post Op Appt:  12/16/2024 11AM    Case ID: [id number]     Notes:

## 2024-11-12 NOTE — TELEPHONE ENCOUNTER
Pre Admission from the hospital called to let us know that the anesthesiologist reviewed case and patient took diabetic medication yesterday. Patient needs to be rescheduled.    Called patient and lm

## 2024-11-12 NOTE — TELEPHONE ENCOUNTER
Pt's EKG result and provider's notes faxed to [phone], [phone], and [phone].  Confirmation receipts received for all three.

## 2024-11-12 NOTE — PAT NURSING NOTE
Félix at surgeon's office notified per Dr. Juarez that patient needs to be off of Farxiga for 4 days prior to surgery per anesthesia protocol (last dose recorded 11/11/2024).

## 2024-11-13 ENCOUNTER — ANESTHESIA (OUTPATIENT)
Dept: SURGERY | Facility: HOSPITAL | Age: 84
End: 2024-11-13
Payer: MEDICARE

## 2024-11-15 ENCOUNTER — APPOINTMENT (OUTPATIENT)
Dept: CARDIOLOGY | Age: 84
End: 2024-11-15

## 2024-11-20 ENCOUNTER — LAB SERVICES (OUTPATIENT)
Dept: LAB | Age: 84
End: 2024-11-20

## 2024-11-20 ENCOUNTER — APPOINTMENT (OUTPATIENT)
Dept: NEUROLOGY | Age: 84
End: 2024-11-20

## 2024-11-20 VITALS
DIASTOLIC BLOOD PRESSURE: 56 MMHG | WEIGHT: 148.15 LBS | OXYGEN SATURATION: 99 % | HEART RATE: 61 BPM | BODY MASS INDEX: 26.25 KG/M2 | HEIGHT: 63 IN | SYSTOLIC BLOOD PRESSURE: 91 MMHG

## 2024-11-20 DIAGNOSIS — E78.00 PURE HYPERCHOLESTEROLEMIA: ICD-10-CM

## 2024-11-20 DIAGNOSIS — G60.8 OTHER HEREDITARY AND IDIOPATHIC NEUROPATHIES: Primary | ICD-10-CM

## 2024-11-20 DIAGNOSIS — I42.2 HYPERTROPHIC CARDIOMYOPATHY  (CMD): ICD-10-CM

## 2024-11-20 DIAGNOSIS — I50.32 CHRONIC HEART FAILURE WITH PRESERVED EJECTION FRACTION (HFPEF)  (CMD): ICD-10-CM

## 2024-11-20 DIAGNOSIS — G56.03 BILATERAL CARPAL TUNNEL SYNDROME: ICD-10-CM

## 2024-11-20 DIAGNOSIS — E55.9 VITAMIN D DEFICIENCY: ICD-10-CM

## 2024-11-20 LAB
25(OH)D3+25(OH)D2 SERPL-MCNC: 35.4 NG/ML (ref 30–100)
ALBUMIN SERPL-MCNC: 3.7 G/DL (ref 3.4–5)
ALBUMIN/GLOB SERPL: 0.9 {RATIO} (ref 1–2.4)
ALP SERPL-CCNC: 89 UNITS/L (ref 45–117)
ALT SERPL-CCNC: 18 UNITS/L
ANION GAP SERPL CALC-SCNC: 11 MMOL/L (ref 7–19)
AST SERPL-CCNC: 19 UNITS/L
BASOPHILS # BLD: 0.1 K/MCL (ref 0–0.3)
BASOPHILS NFR BLD: 1 %
BILIRUB SERPL-MCNC: 0.5 MG/DL (ref 0.2–1)
BUN SERPL-MCNC: 23 MG/DL (ref 6–20)
BUN/CREAT SERPL: 23 (ref 7–25)
CALCIUM SERPL-MCNC: 9.9 MG/DL (ref 8.4–10.2)
CHLORIDE SERPL-SCNC: 107 MMOL/L (ref 97–110)
CHOLEST SERPL-MCNC: 158 MG/DL
CHOLEST/HDLC SERPL: 2.3 {RATIO}
CO2 SERPL-SCNC: 25 MMOL/L (ref 21–32)
CREAT SERPL-MCNC: 1.01 MG/DL (ref 0.51–0.95)
DEPRECATED RDW RBC: 46.5 FL (ref 39–50)
EGFRCR SERPLBLD CKD-EPI 2021: 56 ML/MIN/{1.73_M2}
EOSINOPHIL # BLD: 0.3 K/MCL (ref 0–0.5)
EOSINOPHIL NFR BLD: 3 %
ERYTHROCYTE [DISTWIDTH] IN BLOOD: 13.2 % (ref 11–15)
FASTING DURATION TIME PATIENT: 12 HOURS (ref 0–999)
GLOBULIN SER-MCNC: 4.1 G/DL (ref 2–4)
GLUCOSE SERPL-MCNC: 118 MG/DL (ref 70–99)
HCT VFR BLD CALC: 45 % (ref 36–46.5)
HDLC SERPL-MCNC: 70 MG/DL
HGB BLD-MCNC: 15.3 G/DL (ref 12–15.5)
IMM GRANULOCYTES # BLD AUTO: 0 K/MCL (ref 0–0.2)
IMM GRANULOCYTES # BLD: 0 %
LDLC SERPL CALC-MCNC: 69 MG/DL
LYMPHOCYTES # BLD: 3.1 K/MCL (ref 1–4)
LYMPHOCYTES NFR BLD: 34 %
MCH RBC QN AUTO: 32.6 PG (ref 26–34)
MCHC RBC AUTO-ENTMCNC: 34 G/DL (ref 32–36.5)
MCV RBC AUTO: 95.9 FL (ref 78–100)
MONOCYTES # BLD: 0.6 K/MCL (ref 0.3–0.9)
MONOCYTES NFR BLD: 6 %
NEUTROPHILS # BLD: 5.1 K/MCL (ref 1.8–7.7)
NEUTROPHILS NFR BLD: 56 %
NONHDLC SERPL-MCNC: 88 MG/DL
NRBC BLD MANUAL-RTO: 0 /100 WBC
NT-PROBNP SERPL-MCNC: 635 PG/ML
PLATELET # BLD AUTO: 314 K/MCL (ref 140–450)
POTASSIUM SERPL-SCNC: 4.8 MMOL/L (ref 3.4–5.1)
PROT SERPL-MCNC: 7.8 G/DL (ref 6.4–8.2)
RBC # BLD: 4.69 MIL/MCL (ref 4–5.2)
SODIUM SERPL-SCNC: 138 MMOL/L (ref 135–145)
TRIGL SERPL-MCNC: 93 MG/DL
TSH SERPL-ACNC: 3.21 MCUNITS/ML (ref 0.35–5)
WBC # BLD: 9.1 K/MCL (ref 4.2–11)

## 2024-11-20 PROCEDURE — 80053 COMPREHEN METABOLIC PANEL: CPT | Performed by: INTERNAL MEDICINE

## 2024-11-20 PROCEDURE — 85025 COMPLETE CBC W/AUTO DIFF WBC: CPT | Performed by: INTERNAL MEDICINE

## 2024-11-20 PROCEDURE — 99205 OFFICE O/P NEW HI 60 MIN: CPT | Performed by: STUDENT IN AN ORGANIZED HEALTH CARE EDUCATION/TRAINING PROGRAM

## 2024-11-20 PROCEDURE — 83880 ASSAY OF NATRIURETIC PEPTIDE: CPT | Performed by: INTERNAL MEDICINE

## 2024-11-20 PROCEDURE — 80061 LIPID PANEL: CPT | Performed by: INTERNAL MEDICINE

## 2024-11-20 PROCEDURE — 84443 ASSAY THYROID STIM HORMONE: CPT | Performed by: INTERNAL MEDICINE

## 2024-11-20 PROCEDURE — 36415 COLL VENOUS BLD VENIPUNCTURE: CPT | Performed by: INTERNAL MEDICINE

## 2024-11-20 PROCEDURE — 3078F DIAST BP <80 MM HG: CPT | Performed by: STUDENT IN AN ORGANIZED HEALTH CARE EDUCATION/TRAINING PROGRAM

## 2024-11-20 PROCEDURE — 3074F SYST BP LT 130 MM HG: CPT | Performed by: STUDENT IN AN ORGANIZED HEALTH CARE EDUCATION/TRAINING PROGRAM

## 2024-11-20 PROCEDURE — 82306 VITAMIN D 25 HYDROXY: CPT | Performed by: CLINICAL MEDICAL LABORATORY

## 2024-11-20 RX ORDER — GABAPENTIN 300 MG/1
300 CAPSULE ORAL 3 TIMES DAILY
Qty: 90 CAPSULE | Refills: 11 | Status: SHIPPED | OUTPATIENT
Start: 2024-11-20 | End: 2024-12-20

## 2024-11-20 RX ORDER — GABAPENTIN 100 MG/1
200 CAPSULE ORAL 3 TIMES DAILY
COMMUNITY
Start: 2024-10-16 | End: 2024-11-20 | Stop reason: DRUGHIGH

## 2024-11-20 RX ORDER — LEVOTHYROXINE SODIUM 88 UG/1
88 TABLET ORAL DAILY
COMMUNITY
Start: 2024-07-24

## 2024-11-22 ENCOUNTER — APPOINTMENT (OUTPATIENT)
Dept: CARDIOLOGY | Age: 84
End: 2024-11-22

## 2024-11-22 VITALS
SYSTOLIC BLOOD PRESSURE: 92 MMHG | HEIGHT: 62 IN | HEART RATE: 67 BPM | DIASTOLIC BLOOD PRESSURE: 56 MMHG | WEIGHT: 149.25 LBS | BODY MASS INDEX: 27.47 KG/M2

## 2024-11-22 DIAGNOSIS — I42.2 HYPERTROPHIC CARDIOMYOPATHY  (CMD): ICD-10-CM

## 2024-11-22 RX ORDER — DAPAGLIFLOZIN 5 MG/1
5 TABLET, FILM COATED ORAL DAILY
Qty: 90 TABLET | Refills: 11 | Status: SHIPPED | OUTPATIENT
Start: 2024-11-22

## 2024-11-22 RX ORDER — METOPROLOL SUCCINATE 50 MG/1
50 TABLET, EXTENDED RELEASE ORAL DAILY
Qty: 90 TABLET | Refills: 3 | Status: SHIPPED | OUTPATIENT
Start: 2024-11-22

## 2024-11-22 ASSESSMENT — ENCOUNTER SYMPTOMS
ALLERGIC/IMMUNOLOGIC COMMENTS: NO NEW FOOD ALLERGIES
SLEEP DISTURBANCES DUE TO BREATHING: 0
HEMOPTYSIS: 0
DEPRESSION: 0
INSOMNIA: 0
DIZZINESS: 1
SHORTNESS OF BREATH: 0
BRUISES/BLEEDS EASILY: 0
DIARRHEA: 0
BACK PAIN: 1
ABDOMINAL PAIN: 0
WEIGHT LOSS: 1
CHILLS: 0
FEVER: 0
COUGH: 0
BLOATING: 0
SUSPICIOUS LESIONS: 0
HEADACHES: 0
CONSTIPATION: 1
LIGHT-HEADEDNESS: 1
HEMATOCHEZIA: 0

## 2024-11-27 ENCOUNTER — TELEPHONE (OUTPATIENT)
Dept: NEUROLOGY | Age: 84
End: 2024-11-27

## 2024-11-27 ENCOUNTER — APPOINTMENT (OUTPATIENT)
Dept: NEUROLOGY | Age: 84
End: 2024-11-27
Attending: INTERNAL MEDICINE

## 2024-12-03 ENCOUNTER — TELEPHONE (OUTPATIENT)
Dept: INTERNAL MEDICINE CLINIC | Facility: CLINIC | Age: 84
End: 2024-12-03

## 2024-12-03 NOTE — TELEPHONE ENCOUNTER
Recieved request for dose verification from [facility] for Metformin HCL tab 500 mg, placed in TB bin for signature.

## 2024-12-05 ENCOUNTER — TELEPHONE (OUTPATIENT)
Dept: CARDIOLOGY | Age: 84
End: 2024-12-05

## 2024-12-05 ENCOUNTER — PATIENT MESSAGE (OUTPATIENT)
Dept: INTERNAL MEDICINE CLINIC | Facility: CLINIC | Age: 84
End: 2024-12-05

## 2024-12-06 ENCOUNTER — HOSPITAL ENCOUNTER (OUTPATIENT)
Facility: HOSPITAL | Age: 84
Setting detail: HOSPITAL OUTPATIENT SURGERY
Discharge: HOME | End: 2024-12-06
Attending: STUDENT IN AN ORGANIZED HEALTH CARE EDUCATION/TRAINING PROGRAM | Admitting: STUDENT IN AN ORGANIZED HEALTH CARE EDUCATION/TRAINING PROGRAM
Payer: MEDICARE

## 2024-12-06 ENCOUNTER — TELEPHONE (OUTPATIENT)
Dept: CARDIOLOGY | Age: 84
End: 2024-12-06

## 2024-12-06 VITALS
BODY MASS INDEX: 28.51 KG/M2 | RESPIRATION RATE: 20 BRPM | WEIGHT: 151 LBS | TEMPERATURE: 97 F | HEART RATE: 62 BPM | SYSTOLIC BLOOD PRESSURE: 116 MMHG | OXYGEN SATURATION: 98 % | HEIGHT: 61 IN | DIASTOLIC BLOOD PRESSURE: 65 MMHG

## 2024-12-06 LAB — GLUCOSE BLD-MCNC: 110 MG/DL (ref 70–99)

## 2024-12-06 PROCEDURE — 01N54ZZ RELEASE MEDIAN NERVE, PERCUTANEOUS ENDOSCOPIC APPROACH: ICD-10-PCS | Performed by: STUDENT IN AN ORGANIZED HEALTH CARE EDUCATION/TRAINING PROGRAM

## 2024-12-06 PROCEDURE — 82962 GLUCOSE BLOOD TEST: CPT

## 2024-12-06 RX ORDER — ACETAMINOPHEN 500 MG
1000 TABLET ORAL ONCE AS NEEDED
Status: DISCONTINUED | OUTPATIENT
Start: 2024-12-06 | End: 2024-12-06

## 2024-12-06 RX ORDER — SENNOSIDES 8.6 MG
650 CAPSULE ORAL EVERY 8 HOURS PRN
Qty: 30 TABLET | Refills: 0 | Status: SHIPPED | OUTPATIENT
Start: 2024-12-06

## 2024-12-06 RX ORDER — ONDANSETRON 2 MG/ML
4 INJECTION INTRAMUSCULAR; INTRAVENOUS EVERY 6 HOURS PRN
Status: DISCONTINUED | OUTPATIENT
Start: 2024-12-06 | End: 2024-12-06

## 2024-12-06 RX ORDER — DEXTROSE MONOHYDRATE 25 G/50ML
50 INJECTION, SOLUTION INTRAVENOUS
Status: DISCONTINUED | OUTPATIENT
Start: 2024-12-06 | End: 2024-12-06 | Stop reason: HOSPADM

## 2024-12-06 RX ORDER — ONDANSETRON 2 MG/ML
INJECTION INTRAMUSCULAR; INTRAVENOUS AS NEEDED
Status: DISCONTINUED | OUTPATIENT
Start: 2024-12-06 | End: 2024-12-06 | Stop reason: SURG

## 2024-12-06 RX ORDER — HYDROCODONE BITARTRATE AND ACETAMINOPHEN 5; 325 MG/1; MG/1
1 TABLET ORAL ONCE AS NEEDED
Status: DISCONTINUED | OUTPATIENT
Start: 2024-12-06 | End: 2024-12-06

## 2024-12-06 RX ORDER — HYDROMORPHONE HYDROCHLORIDE 1 MG/ML
0.4 INJECTION, SOLUTION INTRAMUSCULAR; INTRAVENOUS; SUBCUTANEOUS EVERY 5 MIN PRN
Status: DISCONTINUED | OUTPATIENT
Start: 2024-12-06 | End: 2024-12-06

## 2024-12-06 RX ORDER — HYDROMORPHONE HYDROCHLORIDE 1 MG/ML
0.6 INJECTION, SOLUTION INTRAMUSCULAR; INTRAVENOUS; SUBCUTANEOUS EVERY 5 MIN PRN
Status: DISCONTINUED | OUTPATIENT
Start: 2024-12-06 | End: 2024-12-06

## 2024-12-06 RX ORDER — HYDROCODONE BITARTRATE AND ACETAMINOPHEN 5; 325 MG/1; MG/1
2 TABLET ORAL ONCE AS NEEDED
Status: DISCONTINUED | OUTPATIENT
Start: 2024-12-06 | End: 2024-12-06

## 2024-12-06 RX ORDER — LIDOCAINE HYDROCHLORIDE 10 MG/ML
INJECTION, SOLUTION EPIDURAL; INFILTRATION; INTRACAUDAL; PERINEURAL AS NEEDED
Status: DISCONTINUED | OUTPATIENT
Start: 2024-12-06 | End: 2024-12-06 | Stop reason: SURG

## 2024-12-06 RX ORDER — SODIUM CHLORIDE, SODIUM LACTATE, POTASSIUM CHLORIDE, CALCIUM CHLORIDE 600; 310; 30; 20 MG/100ML; MG/100ML; MG/100ML; MG/100ML
INJECTION, SOLUTION INTRAVENOUS CONTINUOUS
Status: DISCONTINUED | OUTPATIENT
Start: 2024-12-06 | End: 2024-12-06

## 2024-12-06 RX ORDER — DEXAMETHASONE SODIUM PHOSPHATE 4 MG/ML
VIAL (ML) INJECTION AS NEEDED
Status: DISCONTINUED | OUTPATIENT
Start: 2024-12-06 | End: 2024-12-06 | Stop reason: SURG

## 2024-12-06 RX ORDER — NICOTINE POLACRILEX 4 MG
30 LOZENGE BUCCAL
Status: DISCONTINUED | OUTPATIENT
Start: 2024-12-06 | End: 2024-12-06 | Stop reason: HOSPADM

## 2024-12-06 RX ORDER — METOCLOPRAMIDE HYDROCHLORIDE 5 MG/ML
10 INJECTION INTRAMUSCULAR; INTRAVENOUS EVERY 8 HOURS PRN
Status: DISCONTINUED | OUTPATIENT
Start: 2024-12-06 | End: 2024-12-06

## 2024-12-06 RX ORDER — ACETAMINOPHEN 500 MG
1000 TABLET ORAL ONCE
Status: DISCONTINUED | OUTPATIENT
Start: 2024-12-06 | End: 2024-12-06 | Stop reason: HOSPADM

## 2024-12-06 RX ORDER — HYDROMORPHONE HYDROCHLORIDE 1 MG/ML
0.2 INJECTION, SOLUTION INTRAMUSCULAR; INTRAVENOUS; SUBCUTANEOUS EVERY 5 MIN PRN
Status: DISCONTINUED | OUTPATIENT
Start: 2024-12-06 | End: 2024-12-06

## 2024-12-06 RX ORDER — NALOXONE HYDROCHLORIDE 0.4 MG/ML
0.08 INJECTION, SOLUTION INTRAMUSCULAR; INTRAVENOUS; SUBCUTANEOUS AS NEEDED
Status: DISCONTINUED | OUTPATIENT
Start: 2024-12-06 | End: 2024-12-06

## 2024-12-06 RX ORDER — NICOTINE POLACRILEX 4 MG
15 LOZENGE BUCCAL
Status: DISCONTINUED | OUTPATIENT
Start: 2024-12-06 | End: 2024-12-06 | Stop reason: HOSPADM

## 2024-12-06 RX ORDER — ACETAMINOPHEN 500 MG
1000 TABLET ORAL EVERY 6 HOURS PRN
COMMUNITY

## 2024-12-06 RX ADMIN — SODIUM CHLORIDE, SODIUM LACTATE, POTASSIUM CHLORIDE, CALCIUM CHLORIDE: 600; 310; 30; 20 INJECTION, SOLUTION INTRAVENOUS at 08:48:00

## 2024-12-06 RX ADMIN — LIDOCAINE HYDROCHLORIDE 50 MG: 10 INJECTION, SOLUTION EPIDURAL; INFILTRATION; INTRACAUDAL; PERINEURAL at 08:26:00

## 2024-12-06 RX ADMIN — SODIUM CHLORIDE, SODIUM LACTATE, POTASSIUM CHLORIDE, CALCIUM CHLORIDE: 600; 310; 30; 20 INJECTION, SOLUTION INTRAVENOUS at 08:23:00

## 2024-12-06 RX ADMIN — DEXAMETHASONE SODIUM PHOSPHATE 4 MG: 4 MG/ML VIAL (ML) INJECTION at 08:30:00

## 2024-12-06 RX ADMIN — ONDANSETRON 4 MG: 2 INJECTION INTRAMUSCULAR; INTRAVENOUS at 08:30:00

## 2024-12-06 NOTE — DISCHARGE INSTRUCTIONS
SCARLETT XIONG MD  PATIENT DISCHARGE INSTRUCTIONS    POST PROCEDURE CARE AND INFECTION PREVENTION:  Dressing: Keep the dressing on until post op day 5. If you have steri-strips under your dressing, please keep those in place until they fall off on their own. Do not peel them off. If you do not have steri-strips over your incision, keep incision covered with band aid until follow up. OK to shower and run water over the incision beginning post op day 5. Do not soak the incision in pools or tubs until you are instructed by your doctor.   Weight Bearing: No lifting greater than 1 pound.    Activity: Resume your normal activities of daily living within the 1 pound lifting restriction.   Pain: Ice and elevate extremity to minimize swelling. Take acetaminophen and ibuprofen for pain.  Follow up: Call for follow-up appointment if you do not have one.     MEDICATION:  See Medication Reconciliation Form for any new prescriptions.    You may resume your usual medications unless instructed otherwise.  Do not drive, operate machinery or drink alcoholic beverages while taking narcotic pain medication.  Narcotic pain medication may cause constipation.  If no bowel movement in 48 hours, please contact your physician.    IN CASE OF EMERGENCY:  If you are unable to reach your doctor, call or go to the nearest emergency room.     INSTRUCTIONS FOR PATIENTS WITH GENERAL / IV SEDATION ONLY:  Begin with clear liquids then progress to your normal diet if not nauseated.  Nausea and vomiting occasionally occur after surgery.  If you are nauseated, remain on clear liquids until it passes.  If it should persist for any length of time at home, notify your doctor.  Rest on the day of surgery.  You may increase activity as tolerated starting tomorrow.  Do not drive, operate hazardous machinery, or make important personal or legal decisions for 24 hours.  You may feel dizzy or lightheaded from anesthesia.  Move cautiously for 24 hours.       Lm Gallegos MD   Hand, Wrist, & Elbow Surgery  [email or web address]

## 2024-12-06 NOTE — ANESTHESIA PREPROCEDURE EVALUATION
PRE-OP EVALUATION    Patient Name: Sofiya Martell    Admit Diagnosis: Carpal tunnel syndrome on right [G56.01]    Pre-op Diagnosis: Carpal tunnel syndrome on right [G56.01]    Right endoscopic carpal tunnel release, possible open    Anesthesia Procedure: Right endoscopic carpal tunnel release, possible open (Right)    Surgeons and Role:     * Lm Gallegos MD - Primary    Pre-op vitals reviewed.  Temp: 98.6 °F (37 °C)  Pulse: 63  Resp: 15  BP: 122/76  SpO2: 98 %  Body mass index is 28.53 kg/m².    Current medications reviewed.  Hospital Medications:   acetaminophen (Tylenol Extra Strength) tab 1,000 mg  1,000 mg Oral Once    glucose (Dex4) 15 GM/59ML oral liquid 15 g  15 g Oral Q15 Min PRN    Or    glucose (Glutose) 40% oral gel 15 g  15 g Oral Q15 Min PRN    Or    glucose-vitamin C (Dex-4) chewable tab 4 tablet  4 tablet Oral Q15 Min PRN    Or    dextrose 50% injection 50 mL  50 mL Intravenous Q15 Min PRN    Or    glucose (Dex4) 15 GM/59ML oral liquid 30 g  30 g Oral Q15 Min PRN    Or    glucose (Glutose) 40% oral gel 30 g  30 g Oral Q15 Min PRN    Or    glucose-vitamin C (Dex-4) chewable tab 8 tablet  8 tablet Oral Q15 Min PRN    lactated ringers infusion   Intravenous Continuous    ceFAZolin (Ancef) 2g in 10mL IV syringe premix  2 g Intravenous Once    ceFAZolin (Ancef) 2 g/10mL IV syringe premix        bupivacaine/lidocaine w Epi/sodium bicarbonate local mixture 10mL syringe   Infiltration Once (Intra-Op)       Outpatient Medications:   Prescriptions Prior to Admission[1]    Allergies: Adhesive tape, Ace inhibitors, and Latex      Anesthesia Evaluation    Patient summary reviewed.    Anesthetic Complications           GI/Hepatic/Renal                                 Cardiovascular                  (+) hypertension   (+) hyperlipidemia  (+) CAD             (+) dysrhythmias   (+) CHF                Endo/Other      (+) diabetes  type 2,                          Pulmonary                            Neuro/Psych      (+) depression  (+) anxiety         (+) neuromuscular disease             Anxiety and depression Bilateral carotid artery stenosis  Bilateral carpal tunnel syndrome CKD (chronic kidney disease) stage 3, GFR 30-59 ml/min (HCC)  COVID-19 virus infection Chronic heart failure with preserved ejection fraction (HFpEF) (HCC)  Depression, recurrent (HCC) Dyslipidemia  HTN (hypertension) Hypertrophic cardiomyopathy (HCC)  Hypothyroidism Internal hemorrhoids without mention of complication  Low vitamin B12 level Osteopenia  Type 2 diabetes mellitus with obesity (HCC) Unspecified hereditary and idiopathic peripheral neuropathy  Ventricular tachycardia (HCC) Vitamin D deficiency            Past Surgical History:   Procedure Laterality Date    Cardiac pacemaker placement      Cataract      Colonoscopy      Pacemaker/defibrillator  05/16/2018    [facility]     Social History     Socioeconomic History    Marital status:    Tobacco Use    Smoking status: Former     Types: Cigarettes    Smokeless tobacco: Never    Tobacco comments:     quit 30 years ago as of 9/11/19   Vaping Use    Vaping status: Never Used   Substance and Sexual Activity    Alcohol use: Not Currently     Comment: rare    Drug use: No    Sexual activity: Not Currently   Other Topics Concern    Caffeine Concern Yes     Comment: 5-6 cups a day    Exercise No     Comment: walking      History   Drug Use No     Available pre-op labs reviewed.     Lab Results   Component Value Date     10/17/2024    K 4.6 10/17/2024     10/17/2024    CO2 24.0 10/17/2024    BUN 24 (H) 10/17/2024    CREATSERUM 1.10 (H) 10/17/2024     (H) 10/17/2024    CA 10.1 10/17/2024            Airway      Mallampati: I  Mouth opening: >3 FB  TM distance: > 6 cm  Neck ROM: full Cardiovascular    Cardiovascular exam normal.         Dental    Dentition appears grossly intact         Pulmonary    Pulmonary exam normal.                 Other findings               ASA: 3   Plan: MAC  NPO status verified and           Plan/risks discussed with: patient                Present on Admission:  **None**             [1]   Medications Prior to Admission   Medication Sig Dispense Refill Last Dose/Taking    acetaminophen 500 MG Oral Tab Take 2 tablets (1,000 mg total) by mouth every 6 (six) hours as needed for Pain.   12/6/2024 Morning    gabapentin 100 MG Oral Cap Take 2 capsules (200 mg total) by mouth 3 (three) times daily. 180 capsule 2 12/6/2024 Morning    metFORMIN 500 MG Oral Tab Take 1 tablet (500 mg total) by mouth 2 (two) times daily with meals. 180 tablet 3 12/5/2024    levothyroxine 88 MCG Oral Tab Take 1 tablet (88 mcg total) by mouth before breakfast. Repeat labs in 2 months 90 tablet 3 12/6/2024 Morning    dapagliflozin 10 MG Oral Tab Take 1 tablet (10 mg total) by mouth daily.   12/2/2024    spironolactone 25 MG Oral Tab Take 0.5 tablets (12.5 mg total) by mouth daily.   12/2/2024    furosemide 20 MG Oral Tab TAKE 1/2 TABLET BY MOUTH DAILY AS NEEDED FOR SWELLING OR WEIGHT GAIN   Past Week    VITAMIN D, CHOLECALCIFEROL, OR Take by mouth.   Past Week    DULoxetine 60 MG Oral Cap DR Particles TAKE 1 CAPSULE(60 MG) BY MOUTH EVERY DAY 90 capsule 3 12/6/2024 Morning    metoprolol succinate 100 MG Oral Tablet 24 Hr Take 1 tablet (100 mg total) by mouth daily.   12/5/2024 at 12:00 AM    atorvastatin 40 MG Oral Tab Take 1 tablet (40 mg total) by mouth daily. 90 tablet 1 12/5/2024    Olmesartan Medoxomil 5 MG Oral Tab Take 2 tablets (10 mg total) by mouth daily.   Taking    latanoprost (XALATAN) 0.005 % Ophthalmic Solution Place 1 drop into both eyes nightly.  2 12/6/2024 Morning    Aspirin 81 MG Oral Tab Take 1 tablet (81 mg total) by mouth daily.   12/2/2024    diphenhydrAMINE-APAP, sleep,  MG Oral Tab Take 1 tablet by mouth nightly as needed.   Taking As Needed    Glucose Blood (ONETOUCH VERIO) In Vitro Strip 1 strip by In Vitro route daily. 100 strip 1      [] OneTouch Delica Lancets 33G Does not apply Misc 1 Lancet by Finger stick route daily as needed. 100 each 1

## 2024-12-06 NOTE — H&P
Clinic Note      Pre-Op H&P    Allergies[1]    CC: bilateral carpal tunnel syndrome    HPI:   From prior visit 11/5/24:  This 84 year old RHD female presents with numbness and tingling in bilateral hands. She has a lengthy several-year history of this and underwent an EMG in 2019 which confirmed bilateral carpal tunnel. In addition she has been managed with steroid injections to the bilateral carpal tunnels with good relief for several years. 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 In addition, she has pain in the entire extremity from the shoulder down to her forearm. She has weakness gripping and holding onto objects. She has a history of cervical stenosis and was recommended a steroid injection for the neck by another provider, but she has deferred for now. The pain and numbness in her fingers, primarily the thumb/index/middle finger of both hands but occasionally all fingers, bother her on a daily basis and wakes her up at night. She recently saw Ashely Kruger and was referred to me for consideration of surgical carpal tunnel release. Right now, her right sided symptoms bother her more than her left.     Today in pre-op:  Symptoms are unchanged today. She does continue to have neck pain and pain in her entirety extremity, and I counseled her that carpal tunnel release is unlikely to reliably resolve those symptoms. My hope is that carpal tunnel release surgery will alleviate the symptoms she is having in her thumb, index, and middle fingers especially. Given the longstanding nature of her carpal tunnel syndrome and the severity, some of her symptoms may be permanent and not improve after surgery. She understood.    Past Medical History:    Arrhythmia    Atherosclerosis of coronary artery    Cardiomyopathy (HCC)    Congestive heart disease (HCC)    Depression    Diabetes (HCC)    Disorder of thyroid     Diverticulosis of large intestine    Essential hypertension    Glaucoma    High blood pressure    High cholesterol    Hyperlipidemia    Lumbosacral stenosis    Osteoarthritis    Pacemaker    Presence of cardiac defibrillator    Renal disorder    CKD    Thyroid disease    Visual impairment     Past Surgical History:   Procedure Laterality Date    Cardiac pacemaker placement      Cataract      Colonoscopy      Pacemaker/defibrillator  05/16/2018    [facility]     No current outpatient medications on file.     Family History   Adopted: Yes   Problem Relation Age of Onset    Heart Disorder Father     Heart Disorder Son     Other (subclavian stenosis) Son      Social History     Occupational History    Not on file   Tobacco Use    Smoking status: Former     Types: Cigarettes    Smokeless tobacco: Never    Tobacco comments:     quit 30 years ago as of 9/11/19   Vaping Use    Vaping status: Never Used   Substance and Sexual Activity    Alcohol use: Not Currently     Comment: rare    Drug use: No    Sexual activity: Not Currently        Review of Systems:  Negative unless stated in HPI    Physical Exam:    /76 (BP Location: Left arm)   Pulse 63   Temp 98.6 °F (37 °C)   Resp 15   Ht 5' 1\" (1.549 m)   Wt 151 lb 0.2 oz (68.5 kg)   LMP  (LMP Unknown)   SpO2 98%   BMI 28.53 kg/m²     Constitutional: NAD. AOx3. Well-developed and Well-nourished.   Psychiatric: Normal mood/ affect/ behavior. Judgment and thought content normal.     CV: regular rate  Lungs: nonlabored respirations    Bilateral Upper Extremity:   Inspection: Skin Intact. No skin lesions. No gross deformity.   Palpation:  No focal areas of TTP   Motion: Elbow: normal bilateral symmetric ext/flex  Wrist: normal bilateral symmetric ext/flex/sup/pro  Fingers: near full composite fist   Vascular 2+ radial pulse       Median Nerve Exam Right Left   Phdurkan + +   Tinel's at Wrist + +   Sensation normal normal   PCBr Sensation normal normal   APB Weakness Yes  Yes   Thenar Atrophy Yes Yes   FDP to index finger weakness Yes Yes     CTS-6 Evaluation Tool  Numbness predominantly or exclusively in median nerve territory  3.5 / (3.5) points  Nocturnal symptoms       4 / (4) points  Thenar atrophy and/or weakness      5 / (5) points  Positive Phalen's test       5 / (5) points  Loss of 2 point discrimination (>5mm)     4.5 / (4.5) points  Positive Tinel Sign       4 / (4) points            Total: 26 / (26) points      Ulnar Nerve Exam Right Left   Sensation normal normal   Tinel's at Medial Elbow neg neg   Elbow Flexion, Nerve Compression Testing neg neg   1st YING Weakness No No   Hypothenar Atrophy Yes Yes   FDP to small finger weakness Yes Yes     Radial Nerve Exam  Right Left   Radial Sensory normal normal     2-point sensation is diminished in all digits, >10mm.     Diagnostic Studies:    EMG/NCV from 2019 at OSH reveals bilateral moderate carpal tunnel syndrome.     Assessment/Plan:  84 year old female with bilateral carpal tunnel syndrome.    I reviewed the patient's electronic medical record, including the pertinent office notes, medical/surgical history, medications and images. I specifically reviewed the images noted above, independently and discussed my independent interpretation of these images in combination with the pertinent positive and negative findings in my clinical exam with the patient.    bilateral carpal tunnel syndrome    We had an extensive discussion today regarding the nature of a presumptive compressive neuropathy, and specifically the nature of carpal tunnel syndrome including signs and symptoms of the condition, general considerations for causation and progression of the condition, and strategies for treatment. The nature of progressive nerve dysfunction including reversible and irreversible changes have been discussed, along with the influence of timing on treatment considerations and prognosis for recovery. The potential for other or secondary  sources of nerve compression or other nerve involvement have been reviewed. The indications and nature of surgical versus nonsurgical options have been discussed, as well as the inherent risks and benefits of each treatment. The patient verbalizes understanding of our discussion.     We again discussed at length that the patient may have a component of double crush syndrome or additional explanations for her symptoms, but given the EMG/NCV confirmed carpal tunnel syndrome diagnosis as well as her clinical exam I do believe it is reasonable to proceed with carpal tunnel release surgery. She may not achieve full relief of all of her symptoms but I am hopeful she will experience some improvement. She understood all of this.     2. Cervical stenosis - patient is observing this for now and following with another provider    3. DMII - current A1c = 6.1    Plan for right endoscopic carpal tunnel release, possible open.    Lm Gallegos MD   Hand, Wrist, & Elbow Surgery  [email or web address]         [1]   Allergies  Allergen Reactions    Adhesive Tape OTHER (SEE COMMENTS)     She developed \"bumps\" at the site       Ace Inhibitors Coughing     CLASS    Latex RASH

## 2024-12-06 NOTE — ANESTHESIA POSTPROCEDURE EVALUATION
[facility]    Sofiya Martell Patient Status:  Hospital Outpatient Surgery   Age/Gender 84 year old female MRN [id number]   Location [facility] SURGERY Attending Lm Gallegos MD   Hosp Day # 0 PCP Kat Banerjee MD       Anesthesia Post-op Note    Right endoscopic carpal tunnel release    Procedure Summary       Date: 12/06/24 Room / Location:  MAIN OR [address] [place] [address] MAIN OR    Anesthesia Start: 0823 Anesthesia Stop: 0856    Procedure: Right endoscopic carpal tunnel release (Right: Wrist) Diagnosis:       Carpal tunnel syndrome on right      (Carpal tunnel syndrome on right [G56.01])    Surgeons: Lm Gallegos MD Anesthesiologist: Farhat Kim MD    Anesthesia Type: MAC ASA Status: 3            Anesthesia Type: MAC    Vitals Value Taken Time   /57 12/06/24 0856   Temp 97.1 12/06/24 0856   Pulse 65 12/06/24 0856   Resp 14 12/06/24 0856   SpO2 99 12/06/24 0856       Patient Location: Same Day Surgery    Anesthesia Type: MAC    Airway Patency: patent    Postop Pain Control: adequate    Mental Status: preanesthetic baseline    Nausea/Vomiting: none    Cardiopulmonary/Hydration status: stable euvolemic    Complications: no apparent anesthesia related complications    Postop vital signs: stable    Dental Exam: Unchanged from Preop    Patient to be discharged home when criteria met.

## 2024-12-06 NOTE — OPERATIVE REPORT
Operative Report     Patient Name: Sofiya Martell    12/6/2024    Preoperative Diagnosis:     Carpal tunnel syndrome on right [G56.01]    Postoperative Diagnosis:     Same as above    Surgeons and Role:     * Lm Gallegos MD - Primary     Assistant: Slick Salinas SA    Procedures:     Right endoscopic carpal tunnel release (CPT 35828)    Antibiotics: Ancef 2g    Surgical Findings: Thickened transverse carpal ligament    Anesthesia: Local with 15cc of 1% lidocaine with epi, 0.5% marcaine plain & MAC sedation    Complications: None    Condition: Stable    Estimated Blood Loss: Minimal    Tourniquet Time: 9 min at 250mm Hg, right upper arm    Indications:  84 year old female was indicated for right endoscopic carpal tunnel release as patient had failed nonoperative management of night splinting, rest, activity modifications, therapy, nerve glides, NSAIDs or other appropriate nonoperative care. The symptoms were progressive and more severe and patient did not want to continue with nonoperative care and wanted surgery at this point. Alternatives to continued nonoperative care, however, were discussed and offered.     Today, I discussed with Sofiya Martell the surgical procedure consent at length. I reviewed the nature of the condition, the indications for surgery, the procedure itself, the post-operative course, the expected outcomes as well as the surgical risks and alternatives. Potential benefits, the main goal of nerve decompression of surgery, are to halt progression. Any reversal of this would be added benefit on top of this. There was no guarantee of the outcome. The potential risks were again discussed with the patient which include, but are not limited to, bleeding, infection, nerve or artery damage, anesthesia or wound healing problems, continued muscle atrophy, continued paresthesias or pain, need for further surgery, bleeding, complex regional pain syndrome, and anesthesia risks.  Endoscopic as well as open carpal tunnel release surgery were both discussed, as well as the possibility of converting to an open carpal tunnel release if deemed necessary intraoperatively. Sofiya Martell demonstrated understanding and asked appropriate questions which I answered to their satisfaction. Following this discussion, Sofiya Martell wished to proceed with surgical intervention in the form of right endoscopic carpal tunnel release.    Procedure:  Patient was met in the preoperative holding area where consent was verified, laterality was marked with the surgeon's initials, and the H&P was updated. Patient was brought to the operating room on a transport cart. Patient was then transferred onto the operating room table and placed in supine position with an arm table and with bony prominences well-padded.  SCDs were placed. MAC anesthesia was administered by the anesthesiologist, and local anesthesia administered by me. An upper arm tourniquet was placed and the limb was exsanguinated using Esmarch bandage.The arm was then prepped and draped in the usual sterile fashion. A surgical timeout was performed.Tourniquet was raised to 250mmHg.    A transverse incision through the dermis was made at the the distal volar wrist crease just ulnar to the palmaris longus using a 15 blade. Adson's and Hodge scissors were used to dissect through the subcutaneous tissue onto the antebrachial fascia.  A distally based 1 cm wide rectangular flap was elevated using a Clinch blade. The flap was retracted distally allowing access to the carpal tunnel.  The undersurface of the transverse carpal ligament was accessed, cleaned, and dilated.  The [employer] endoscopic apparatus with camera was inserted into the carpal tunnel.  Under camera magnification with direct visualization of the undersurface of the transverse carpal ligament, the blade was engaged at the distal extent of the ligament and the release of the ligament  was carried out distal to proximal. I verified the release with the endoscopy camera noting divergent edges of the transverse carpal ligament. I also physically verified with a tenosynovium elevator complete release of the transverse carpal ligament. Next, under direct visualization, the proximal antebrachial fascia was completely released.     The tourniquet was then lowered and bipolar cautery was used to achieve hemostasis.       Closure:  The incision was closed using 4-0 Nylon in horizontal mattress fashion. Xeroform was placed.     Dressing/Splint:  Webril and ace wrap were applied over the endoscopic carpal tunnel incision.     Post Operative:  Patient was woken up from anesthesia and taken to PACU for further recovery and discharged home.    Lm Gallegos MD   Hand, Wrist, & Elbow Surgery  [email or web address]

## 2024-12-09 ENCOUNTER — DOCUMENTATION ONLY (OUTPATIENT)
Dept: ORTHOPEDICS CLINIC | Facility: CLINIC | Age: 84
End: 2024-12-09

## 2024-12-09 NOTE — PROGRESS NOTES
Post-Op Telephone Call Note:    Procedure: Right endoscopic carpal tunnel release    Date of Procedure: 12/6/24    Sofiya Martell is post-operative day one status post the above surgery. I called the patient and spoke with them over the phone today. Overall they are recovering well from surgery. Pain is well controlled with OTC medication. I once again discussed the expected post-operative course with the patient. I answered all of their questions regarding the surgical procedure and post-operative course. I will see Sofiya Martell as scheduled on 12/16. Should they have any questions or concerns before their first post-operative appointment with me they were instructed to reach out to the office sooner.     Lm Gallegos MD   Hand, Wrist, & Elbow Surgery  [email or web address]

## 2024-12-10 ENCOUNTER — TELEPHONE (OUTPATIENT)
Dept: NEUROLOGY | Age: 84
End: 2024-12-10

## 2024-12-10 ENCOUNTER — TELEPHONE (OUTPATIENT)
Dept: CARDIOLOGY | Age: 84
End: 2024-12-10

## 2024-12-16 ENCOUNTER — OFFICE VISIT (OUTPATIENT)
Dept: ORTHOPEDICS CLINIC | Facility: CLINIC | Age: 84
End: 2024-12-16
Payer: MEDICARE

## 2024-12-16 DIAGNOSIS — G56.01 CARPAL TUNNEL SYNDROME OF RIGHT WRIST: Primary | ICD-10-CM

## 2024-12-16 PROCEDURE — 1159F MED LIST DOCD IN RCRD: CPT | Performed by: STUDENT IN AN ORGANIZED HEALTH CARE EDUCATION/TRAINING PROGRAM

## 2024-12-16 PROCEDURE — 99024 POSTOP FOLLOW-UP VISIT: CPT | Performed by: STUDENT IN AN ORGANIZED HEALTH CARE EDUCATION/TRAINING PROGRAM

## 2024-12-16 PROCEDURE — 1160F RVW MEDS BY RX/DR IN RCRD: CPT | Performed by: STUDENT IN AN ORGANIZED HEALTH CARE EDUCATION/TRAINING PROGRAM

## 2024-12-16 NOTE — PROGRESS NOTES
Clinic Note     Allergies[1]    Date of Surgery: 24    Procedure: Right endoscopic carpal tunnel release    HPI:  This patient is status post the above surgery. Overall doing well. She states her numbness has remained about the same but she feels like she has been able to use her hand more since surgery, especially with gripping and pinching and picking up objects, which is a nice improvement.      Review of Systems:  Negative unless stated in HPI.    History/Other:   Past Medical History:    Anxiety    Don’t recall    Arrhythmia    Atherosclerosis of coronary artery    Cardiomyopathy (HCC)    Congestive heart disease (HCC)    Depression    Diabetes (HCC)    Disorder of thyroid    Diverticulosis of large intestine    Essential hypertension    Glaucoma    High blood pressure    High cholesterol    Hyperlipidemia    Lumbosacral stenosis    Obesity    Have lost lots of weight    Osteoarthritis    Pacemaker    Presence of cardiac defibrillator    Renal disorder    CKD    Thyroid disease    Visual impairment     Past Surgical History:   Procedure Laterality Date    Cardiac pacemaker placement      Cataract      Colonoscopy      Colonoscopy  10 years ago    D & c  Two times      Twice. 3/61,     Other surgical history  Defibrillator    Defibrillator and pacemaker    Pacemaker/defibrillator  2018    [facility]     Current Outpatient Medications   Medication Sig Dispense Refill    acetaminophen 500 MG Oral Tab Take 2 tablets (1,000 mg total) by mouth every 6 (six) hours as needed for Pain.      Acetaminophen ER (TYLENOL 8 HOUR) 650 MG Oral Tab CR Take 1 tablet (650 mg total) by mouth every 8 (eight) hours as needed for Pain. 30 tablet 0    gabapentin 100 MG Oral Cap Take 2 capsules (200 mg total) by mouth 3 (three) times daily. 180 capsule 2    metFORMIN 500 MG Oral Tab Take 1 tablet (500 mg total) by mouth 2 (two) times daily with meals. 180 tablet 3    levothyroxine 88 MCG Oral Tab Take 1 tablet (88 mcg  total) by mouth before breakfast. Repeat labs in 2 months 90 tablet 3    dapagliflozin 10 MG Oral Tab Take 1 tablet (10 mg total) by mouth daily.      spironolactone 25 MG Oral Tab Take 0.5 tablets (12.5 mg total) by mouth daily.      furosemide 20 MG Oral Tab TAKE 1/2 TABLET BY MOUTH DAILY AS NEEDED FOR SWELLING OR WEIGHT GAIN      VITAMIN D, CHOLECALCIFEROL, OR Take by mouth.      DULoxetine 60 MG Oral Cap DR Particles TAKE 1 CAPSULE(60 MG) BY MOUTH EVERY DAY 90 capsule 3    Glucose Blood (ONETOUCH VERIO) In Vitro Strip 1 strip by In Vitro route daily. 100 strip 1    metoprolol succinate 100 MG Oral Tablet 24 Hr Take 1 tablet (100 mg total) by mouth daily.      atorvastatin 40 MG Oral Tab Take 1 tablet (40 mg total) by mouth daily. 90 tablet 1    Olmesartan Medoxomil 5 MG Oral Tab Take 2 tablets (10 mg total) by mouth daily.      latanoprost (XALATAN) 0.005 % Ophthalmic Solution Place 1 drop into both eyes nightly.  2    Aspirin 81 MG Oral Tab Take 1 tablet (81 mg total) by mouth daily.      diphenhydrAMINE-APAP, sleep,  MG Oral Tab Take 1 tablet by mouth nightly as needed.         Physical Exam:     LMP  (LMP Unknown)     Constitutional: NAD. AOx3. Well-developed and Well-nourished.   Psychiatric: Normal mood/ affect/ behavior. Judgment and thought content normal.     Right Upper Extremity:     Inspection    Surgical wound is clean, dry, intact without signs of infection. No signs of wound dehiscence. Nylon sutures in place   Palpation    Appropriately tender to palpation over surgical wound.      ROM    Able to flex, extend, pronosupinate the wrist gently with ease.    Able to make a near full fist and extend all fingers.      Neurovascular    Normal sensation in the median, ulnar, and radial nerve distribution.     Normally perfused hand(s).                 Assessment/Plan:  84 year old female status post the above surgery.    I reviewed the patient's electronic medical record, including the pertinent  office notes, medical/surgical history, medications and images. I specifically reviewed the images noted above, independently and discussed my independent interpretation of these images in combination with the pertinent positive and negative findings in my clinical exam with the patient    Status post the above surgery on 12/6/24.     Overall doing well. She does have continued numbness she states in all digits which is about the same. 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 It will likely take several months to >1 year to assess the full benefit of surgical release. However the fact that she objectively feels as though she has been able to , pinch, and use her hand more functionally since surgical release is a positive sign. She does have some mild stiffness in her fingers and is interested in attending OT, which I think is very reasonable. I have prescribed this for her today. We discussed her left sided carpal tunnel syndrome as well, and she is interested in proceeding in the near future. We will likely further discuss this and possibly schedule a surgical date at her next follow-up visit.     Follow Up: 4 weeks for repeat evaluation    Lm Gallegos MD   Hand, Wrist, & Elbow Surgery  [email or web address]       [1]   Allergies  Allergen Reactions    Adhesive Tape OTHER (SEE COMMENTS)     She developed \"bumps\" at the site       Ace Inhibitors Coughing     CLASS    Latex RASH

## 2025-01-06 ENCOUNTER — TELEPHONE (OUTPATIENT)
Dept: INTERNAL MEDICINE CLINIC | Facility: CLINIC | Age: 85
End: 2025-01-06

## 2025-01-06 DIAGNOSIS — E11.69 TYPE 2 DIABETES MELLITUS WITH OBESITY (HCC): Primary | ICD-10-CM

## 2025-01-06 DIAGNOSIS — E03.8 OTHER SPECIFIED HYPOTHYROIDISM: ICD-10-CM

## 2025-01-06 DIAGNOSIS — E78.00 HYPERCHOLESTEREMIA: ICD-10-CM

## 2025-01-06 DIAGNOSIS — N18.31 STAGE 3A CHRONIC KIDNEY DISEASE (HCC): ICD-10-CM

## 2025-01-06 DIAGNOSIS — Z00.00 ENCOUNTER FOR WELLNESS EXAMINATION: ICD-10-CM

## 2025-01-06 DIAGNOSIS — E66.9 TYPE 2 DIABETES MELLITUS WITH OBESITY (HCC): Primary | ICD-10-CM

## 2025-01-06 DIAGNOSIS — I10 PRIMARY HYPERTENSION: ICD-10-CM

## 2025-01-06 DIAGNOSIS — E07.9 THYROID DYSFUNCTION: ICD-10-CM

## 2025-01-06 DIAGNOSIS — E78.5 DYSLIPIDEMIA: ICD-10-CM

## 2025-01-06 NOTE — TELEPHONE ENCOUNTER
Patient called requesting an appointment wit Dr. Banerjee to discuss next steps in regards to her health problems of her bilateral hand pain (Carpal tunnel) and neck pain.  Assisted patient in scheduling appointment with NP for 1/9/25.  Patient agrees to plan.

## 2025-01-06 NOTE — TELEPHONE ENCOUNTER
Orders to   Edward            Pt aware to get labs done no sooner than 2 weeks prior to the appt.  Pt aware to fast.  No call back required.    Future Appointments   Date Time Provider Department Center   3/13/2025  3:40 PM Kat Banerjee MD [facility]

## 2025-01-09 ENCOUNTER — OFFICE VISIT (OUTPATIENT)
Facility: CLINIC | Age: 85
End: 2025-01-09
Payer: MEDICARE

## 2025-01-09 VITALS — WEIGHT: 151 LBS | BODY MASS INDEX: 28.51 KG/M2 | HEIGHT: 61 IN

## 2025-01-09 DIAGNOSIS — G56.03 BILATERAL CARPAL TUNNEL SYNDROME: Primary | ICD-10-CM

## 2025-01-09 PROCEDURE — 99214 OFFICE O/P EST MOD 30 MIN: CPT | Performed by: STUDENT IN AN ORGANIZED HEALTH CARE EDUCATION/TRAINING PROGRAM

## 2025-01-09 RX ORDER — METOPROLOL SUCCINATE 50 MG/1
50 TABLET, EXTENDED RELEASE ORAL DAILY
COMMUNITY
Start: 2024-11-22

## 2025-01-09 RX ORDER — GABAPENTIN 300 MG/1
CAPSULE ORAL
COMMUNITY
Start: 2024-11-20

## 2025-01-09 NOTE — PROGRESS NOTES
Clinic Note     Allergies[1]    Date of Surgery: 12/6/24    Procedure: Right endoscopic carpal tunnel release    HPI:  This patient is status post the above surgery. Overall doing well. She states her numbness in the right hand median nerve distribution has continued to remain about the same but her pain is dramatically improved, and she is sleeping through the night much better than she was prior to surgery. Furthermore she continues to state she feels like she has been able to use her hand more since surgery, especially with gripping and pinching and picking up objects, which is a nice improvement. At her last visit I did prescribe her occupational therapy to work on strengthening the digits of her right hand and she is still interested in this, planning to see a therapist close to home in the next few weeks to start sessions. She is inquiring about proceeding with surgery for the left wrist as she continues to have numbness and tingling predominantly in the thumb, index, and middle fingers of that hand. She also has weakness gripping and holding onto objects with the left hand as well. Pre-operatively her right sided symptoms were worse, however now after surgical release of the right side, her left hand numbness and tingling bother her more.       Review of Systems:  Negative unless stated in HPI.    History/Other:   Past Medical History:    Anxiety    Don’t recall    Arrhythmia    Atherosclerosis of coronary artery    Cardiomyopathy (HCC)    Congestive heart disease (HCC)    Depression    Diabetes (HCC)    Disorder of thyroid    Diverticulosis of large intestine    Essential hypertension    Glaucoma    High blood pressure    High cholesterol    Hyperlipidemia    Lumbosacral stenosis    Obesity    Have lost lots of weight    Osteoarthritis    Pacemaker    Presence of cardiac defibrillator    Renal disorder    CKD    Thyroid disease    Visual impairment     Past Surgical History:   Procedure Laterality Date     Cardiac pacemaker placement      Cataract      Colonoscopy      Colonoscopy  10 years ago    D & c  Two times      Twice. 3/61,     Other surgical history  Defibrillator    Defibrillator and pacemaker    Pacemaker/defibrillator  2018    [facility]     Current Outpatient Medications   Medication Sig Dispense Refill    gabapentin 300 MG Oral Cap TAKE 1 CAPSULE BY MOUTH EVERY MORNING AND AT NOON AND 1 AT NIGHT      metoprolol succinate ER 50 MG Oral Tablet 24 Hr Take 1 tablet (50 mg total) by mouth daily.      acetaminophen 500 MG Oral Tab Take 2 tablets (1,000 mg total) by mouth every 6 (six) hours as needed for Pain.      Acetaminophen ER (TYLENOL 8 HOUR) 650 MG Oral Tab CR Take 1 tablet (650 mg total) by mouth every 8 (eight) hours as needed for Pain. 30 tablet 0    gabapentin 100 MG Oral Cap Take 2 capsules (200 mg total) by mouth 3 (three) times daily. 180 capsule 2    metFORMIN 500 MG Oral Tab Take 1 tablet (500 mg total) by mouth 2 (two) times daily with meals. 180 tablet 3    levothyroxine 88 MCG Oral Tab Take 1 tablet (88 mcg total) by mouth before breakfast. Repeat labs in 2 months 90 tablet 3    dapagliflozin 10 MG Oral Tab Take 1 tablet (10 mg total) by mouth daily.      spironolactone 25 MG Oral Tab Take 0.5 tablets (12.5 mg total) by mouth daily.      furosemide 20 MG Oral Tab TAKE 1/2 TABLET BY MOUTH DAILY AS NEEDED FOR SWELLING OR WEIGHT GAIN      VITAMIN D, CHOLECALCIFEROL, OR Take by mouth.      DULoxetine 60 MG Oral Cap DR Particles TAKE 1 CAPSULE(60 MG) BY MOUTH EVERY DAY 90 capsule 3    Glucose Blood (ONETOUCH VERIO) In Vitro Strip 1 strip by In Vitro route daily. 100 strip 1    metoprolol succinate 100 MG Oral Tablet 24 Hr Take 1 tablet (100 mg total) by mouth daily.      atorvastatin 40 MG Oral Tab Take 1 tablet (40 mg total) by mouth daily. 90 tablet 1    Olmesartan Medoxomil 5 MG Oral Tab Take 2 tablets (10 mg total) by mouth daily.      latanoprost (XALATAN) 0.005 % Ophthalmic  Solution Place 1 drop into both eyes nightly.  2    Aspirin 81 MG Oral Tab Take 1 tablet (81 mg total) by mouth daily.      diphenhydrAMINE-APAP, sleep,  MG Oral Tab Take 1 tablet by mouth nightly as needed.         Physical Exam:     Ht 5' 1\" (1.549 m)   Wt 151 lb (68.5 kg)   LMP  (LMP Unknown)   BMI 28.53 kg/m²     Constitutional: NAD. AOx3. Well-developed and Well-nourished.   Psychiatric: Normal mood/ affect/ behavior. Judgment and thought content normal.     Right Upper Extremity:     Inspection    Surgical wound is well healed without signs of infection. No signs of wound dehiscence.    Palpation    Non-tender to palpation over surgical wound.      ROM    Able to flex, extend, pronosupinate the wrist gently with ease.    Able to make a near full fist and extend all fingers, at baseline per patient.      Neurovascular    Normal sensation in the median, ulnar, and radial nerve distribution.     Normally perfused hand(s).               Left Upper Extremity:    Median Nerve Exam Right Left   Durkan negative +   Tinel's at Wrist negative +   Sensation normal normal   PCBr Sensation normal normal   APB Weakness Yes Yes   Thenar Atrophy Yes Yes   FDP to index finger weakness Yes Yes     CTS-6 Evaluation Tool - LEFT  Numbness predominantly or exclusively in median nerve territory   3.5 / (3.5) points  Nocturnal symptoms                                                                           4 / (4) points  Thenar atrophy and/or weakness                                                                   5 / (5) points  Positive Phalen's test                                                                          5 / (5) points  Loss of 2 point discrimination (>5mm)                                                           4.5 / (4.5) points  Positive Tinel Sign                                                                              4 / (4) points                                                                                                             Total: 26 / (26) points    Ulnar Nerve Exam Right Left   Sensation normal normal   Tinel's at Medial Elbow neg neg   Elbow Flexion, Nerve Compression Testing neg neg   1st YING Weakness No No   Hypothenar Atrophy Yes Yes   FDP to small finger weakness Yes Yes     Radial Nerve Exam  Right Left   Radial Sensory normal normal     2-point sensation is diminished in all digits, >10mm.     Able to make a near full fist and extend all fingers of the left hand.     Diagnostic Studies:  EMG/NCV from 2019 at [facility] reveals bilateral moderate carpal tunnel syndrome.     Assessment/Plan:  84 year old female status post right endoscopic carpal tunnel release on 12/6/24, also with left carpal tunnel syndrome.    I reviewed the patient's electronic medical record, including the pertinent office notes, medical/surgical history, medications and images. I specifically reviewed the images noted above, independently and discussed my independent interpretation of these images in combination with the pertinent positive and negative findings in my clinical exam with the patient    Status post right endoscopic carpal tunnel release on 12/6/24.     Overall doing well from this standpoint, although she does have continued numbness she states in all digits which is about the same. 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 It will likely take several months to >1 year to assess the full benefit of surgical release. Furthermore the patient has known peripheral neuropathy, and this may be a contributing factor to her persistent numbness as well. She understood. She is overall pleased with her result especially given her improvement in pain that was waking her up at night. She continues to be interested in OT for strengthening of the digits and I  think this is quite reasonable. This was prescribed at her last visit, and she is planning to attend in the next few weeks.      2.    Left carpal tunnel syndrome    We again had an extensive discussion today regarding the nature of a presumptive compressive neuropathy, and specifically the nature of carpal tunnel syndrome including signs and symptoms of the condition, general considerations for causation and progression of the condition, and strategies for treatment. The nature of progressive nerve dysfunction including reversible and irreversible changes have been discussed, along with the influence of timing on treatment considerations and prognosis for recovery. The potential for other or secondary sources of nerve compression or other nerve involvement have been reviewed. The indications and nature of surgical versus nonsurgical options have been discussed, as well as the inherent risks and benefits of each treatment. The patient verbalizes understanding of our discussion.     Given the patient's peripheral neuropathy and history of cardiomyopathy, I spoke with the patient's cardiologist about potentially obtaining a biopsy during left carpal tunnel release surgery to rule out underlying amyloid disease a contributing factor. Should we proceed with this, I would perform an open left carpal tunnel release as opposed to endoscopic. The patient would like to think about these options and also discuss with her cardiologist, who may instead obtain a skin punch biopsy, before scheduling left carpal tunnel release surgery. I remain ready and available to answer all questions related to surgery moving forward. She will reach out to me when ready to proceed.     Lm Gallegos MD   Hand, Wrist, & Elbow Surgery  [email or web address]         [1]   Allergies  Allergen Reactions    Adhesive Tape OTHER (SEE COMMENTS)     She developed \"bumps\" at the site       Ace Inhibitors Coughing     CLASS    Latex RASH

## 2025-01-09 NOTE — H&P (VIEW-ONLY)
Clinic Note     Allergies[1]    Date of Surgery: 12/6/24    Procedure: Right endoscopic carpal tunnel release    HPI:  This patient is status post the above surgery. Overall doing well. She states her numbness in the right hand median nerve distribution has continued to remain about the same but her pain is dramatically improved, and she is sleeping through the night much better than she was prior to surgery. Furthermore she continues to state she feels like she has been able to use her hand more since surgery, especially with gripping and pinching and picking up objects, which is a nice improvement. At her last visit I did prescribe her occupational therapy to work on strengthening the digits of her right hand and she is still interested in this, planning to see a therapist close to home in the next few weeks to start sessions. She is inquiring about proceeding with surgery for the left wrist as she continues to have numbness and tingling predominantly in the thumb, index, and middle fingers of that hand. She also has weakness gripping and holding onto objects with the left hand as well. Pre-operatively her right sided symptoms were worse, however now after surgical release of the right side, her left hand numbness and tingling bother her more.       Review of Systems:  Negative unless stated in HPI.    History/Other:   Past Medical History:    Anxiety    Don’t recall    Arrhythmia    Atherosclerosis of coronary artery    Cardiomyopathy (HCC)    Congestive heart disease (HCC)    Depression    Diabetes (HCC)    Disorder of thyroid    Diverticulosis of large intestine    Essential hypertension    Glaucoma    High blood pressure    High cholesterol    Hyperlipidemia    Lumbosacral stenosis    Obesity    Have lost lots of weight    Osteoarthritis    Pacemaker    Presence of cardiac defibrillator    Renal disorder    CKD    Thyroid disease    Visual impairment     Past Surgical History:   Procedure Laterality Date     Cardiac pacemaker placement      Cataract      Colonoscopy      Colonoscopy  10 years ago    D & c  Two times      Twice. 3/61,     Other surgical history  Defibrillator    Defibrillator and pacemaker    Pacemaker/defibrillator  2018    [facility]     Current Outpatient Medications   Medication Sig Dispense Refill    gabapentin 300 MG Oral Cap TAKE 1 CAPSULE BY MOUTH EVERY MORNING AND AT NOON AND 1 AT NIGHT      metoprolol succinate ER 50 MG Oral Tablet 24 Hr Take 1 tablet (50 mg total) by mouth daily.      acetaminophen 500 MG Oral Tab Take 2 tablets (1,000 mg total) by mouth every 6 (six) hours as needed for Pain.      Acetaminophen ER (TYLENOL 8 HOUR) 650 MG Oral Tab CR Take 1 tablet (650 mg total) by mouth every 8 (eight) hours as needed for Pain. 30 tablet 0    gabapentin 100 MG Oral Cap Take 2 capsules (200 mg total) by mouth 3 (three) times daily. 180 capsule 2    metFORMIN 500 MG Oral Tab Take 1 tablet (500 mg total) by mouth 2 (two) times daily with meals. 180 tablet 3    levothyroxine 88 MCG Oral Tab Take 1 tablet (88 mcg total) by mouth before breakfast. Repeat labs in 2 months 90 tablet 3    dapagliflozin 10 MG Oral Tab Take 1 tablet (10 mg total) by mouth daily.      spironolactone 25 MG Oral Tab Take 0.5 tablets (12.5 mg total) by mouth daily.      furosemide 20 MG Oral Tab TAKE 1/2 TABLET BY MOUTH DAILY AS NEEDED FOR SWELLING OR WEIGHT GAIN      VITAMIN D, CHOLECALCIFEROL, OR Take by mouth.      DULoxetine 60 MG Oral Cap DR Particles TAKE 1 CAPSULE(60 MG) BY MOUTH EVERY DAY 90 capsule 3    Glucose Blood (ONETOUCH VERIO) In Vitro Strip 1 strip by In Vitro route daily. 100 strip 1    metoprolol succinate 100 MG Oral Tablet 24 Hr Take 1 tablet (100 mg total) by mouth daily.      atorvastatin 40 MG Oral Tab Take 1 tablet (40 mg total) by mouth daily. 90 tablet 1    Olmesartan Medoxomil 5 MG Oral Tab Take 2 tablets (10 mg total) by mouth daily.      latanoprost (XALATAN) 0.005 % Ophthalmic  Solution Place 1 drop into both eyes nightly.  2    Aspirin 81 MG Oral Tab Take 1 tablet (81 mg total) by mouth daily.      diphenhydrAMINE-APAP, sleep,  MG Oral Tab Take 1 tablet by mouth nightly as needed.         Physical Exam:     Ht 5' 1\" (1.549 m)   Wt 151 lb (68.5 kg)   LMP  (LMP Unknown)   BMI 28.53 kg/m²     Constitutional: NAD. AOx3. Well-developed and Well-nourished.   Psychiatric: Normal mood/ affect/ behavior. Judgment and thought content normal.     Right Upper Extremity:     Inspection    Surgical wound is well healed without signs of infection. No signs of wound dehiscence.    Palpation    Non-tender to palpation over surgical wound.      ROM    Able to flex, extend, pronosupinate the wrist gently with ease.    Able to make a near full fist and extend all fingers, at baseline per patient.      Neurovascular    Normal sensation in the median, ulnar, and radial nerve distribution.     Normally perfused hand(s).               Left Upper Extremity:    Median Nerve Exam Right Left   Durkan negative +   Tinel's at Wrist negative +   Sensation normal normal   PCBr Sensation normal normal   APB Weakness Yes Yes   Thenar Atrophy Yes Yes   FDP to index finger weakness Yes Yes     CTS-6 Evaluation Tool - LEFT  Numbness predominantly or exclusively in median nerve territory   3.5 / (3.5) points  Nocturnal symptoms                                                                           4 / (4) points  Thenar atrophy and/or weakness                                                                   5 / (5) points  Positive Phalen's test                                                                          5 / (5) points  Loss of 2 point discrimination (>5mm)                                                           4.5 / (4.5) points  Positive Tinel Sign                                                                              4 / (4) points                                                                                                             Total: 26 / (26) points    Ulnar Nerve Exam Right Left   Sensation normal normal   Tinel's at Medial Elbow neg neg   Elbow Flexion, Nerve Compression Testing neg neg   1st YING Weakness No No   Hypothenar Atrophy Yes Yes   FDP to small finger weakness Yes Yes     Radial Nerve Exam  Right Left   Radial Sensory normal normal     2-point sensation is diminished in all digits, >10mm.     Able to make a near full fist and extend all fingers of the left hand.     Diagnostic Studies:  EMG/NCV from 2019 at OSH reveals bilateral moderate carpal tunnel syndrome.     Assessment/Plan:  84 year old female status post right endoscopic carpal tunnel release on 12/6/24, also with left carpal tunnel syndrome.    I reviewed the patient's electronic medical record, including the pertinent office notes, medical/surgical history, medications and images. I specifically reviewed the images noted above, independently and discussed my independent interpretation of these images in combination with the pertinent positive and negative findings in my clinical exam with the patient    Status post right endoscopic carpal tunnel release on 12/6/24.     Overall doing well from this standpoint, although she does have continued numbness she states in all digits which is about the same. 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 It will likely take several months to >1 year to assess the full benefit of surgical release. Furthermore the patient has known peripheral neuropathy, and this may be a contributing factor to her persistent numbness as well. She understood. She is overall pleased with her result especially given her improvement in pain that was waking her up at night. She continues to be interested in OT for strengthening of the digits and I  think this is quite reasonable. This was prescribed at her last visit, and she is planning to attend in the next few weeks.      2.    Left carpal tunnel syndrome    We again had an extensive discussion today regarding the nature of a presumptive compressive neuropathy, and specifically the nature of carpal tunnel syndrome including signs and symptoms of the condition, general considerations for causation and progression of the condition, and strategies for treatment. The nature of progressive nerve dysfunction including reversible and irreversible changes have been discussed, along with the influence of timing on treatment considerations and prognosis for recovery. The potential for other or secondary sources of nerve compression or other nerve involvement have been reviewed. The indications and nature of surgical versus nonsurgical options have been discussed, as well as the inherent risks and benefits of each treatment. The patient verbalizes understanding of our discussion.     Given the patient's peripheral neuropathy and history of cardiomyopathy, I spoke with the patient's cardiologist about potentially obtaining a biopsy during left carpal tunnel release surgery to rule out underlying amyloid disease a contributing factor. Should we proceed with this, I would perform an open left carpal tunnel release as opposed to endoscopic. The patient would like to think about these options and also discuss with her cardiologist, who may instead obtain a skin punch biopsy, before scheduling left carpal tunnel release surgery. I remain ready and available to answer all questions related to surgery moving forward. She will reach out to me when ready to proceed.     mL Gallegos MD   Hand, Wrist, & Elbow Surgery  [email or web address]         [1]   Allergies  Allergen Reactions    Adhesive Tape OTHER (SEE COMMENTS)     She developed \"bumps\" at the site       Ace Inhibitors Coughing     CLASS    Latex RASH

## 2025-01-10 ENCOUNTER — TELEPHONE (OUTPATIENT)
Dept: CARDIOLOGY | Age: 85
End: 2025-01-10

## 2025-01-14 ENCOUNTER — DOCUMENTATION ONLY (OUTPATIENT)
Dept: ORTHOPEDICS CLINIC | Facility: CLINIC | Age: 85
End: 2025-01-14

## 2025-01-14 ENCOUNTER — E-ADVICE (OUTPATIENT)
Dept: CARDIOLOGY | Age: 85
End: 2025-01-14

## 2025-01-14 NOTE — PROGRESS NOTES
SURGICAL BOOKING SHEET   Name: Sofiya Martell  MRN: [id number]   : 1940    Surgical Date:   TBD   Surgical Consent:   left endoscopic carpal tunnel release, possible open   Diagnosis:    Left carpal tunnel syndrome G56.02   Workers Comp: No   Procedure Codes:   Endoscopic carpal tunnel release (CPT 32843)   Disposition:   Outpatient   Operative Time:   45 mins   Antibiotics:   None   Anesthesia Type:   Monitored Anesthesia Care (MAC) + Local by Surgeon   Clearance:    MEDICAL CLEARANCE   Equipment:   ECTR: *Please see Dr. Gallegos ECTR Pref Card* Andreafski Blade, ARTHREX Endoscopic System, Upper Arm 18-inch tourniquet, Local Pre-Mix (1% lidocaine w/ epi, 0.5% marcaine, 50/50 mix and 20cc total), 4-0 moncryl P3, Dermabond, Telfa x2 +Tegaderm x2. Standby: Lead Hand   Assistant:   Assistant Surgery: No   Follow Up:   Follow up 10-14 days after surgery   Pain Medication:   OTC   Therapy:   None

## 2025-01-15 ENCOUNTER — TELEPHONE (OUTPATIENT)
Dept: INTERNAL MEDICINE CLINIC | Facility: CLINIC | Age: 85
End: 2025-01-15

## 2025-01-15 ENCOUNTER — TELEPHONE (OUTPATIENT)
Dept: ORTHOPEDICS CLINIC | Facility: CLINIC | Age: 85
End: 2025-01-15

## 2025-01-15 ENCOUNTER — DOCUMENTATION ONLY (OUTPATIENT)
Dept: ORTHOPEDICS CLINIC | Facility: CLINIC | Age: 85
End: 2025-01-15

## 2025-01-15 DIAGNOSIS — G56.02 CARPAL TUNNEL SYNDROME ON LEFT: ICD-10-CM

## 2025-01-15 DIAGNOSIS — G56.01 CARPAL TUNNEL SYNDROME ON RIGHT: Primary | ICD-10-CM

## 2025-01-15 NOTE — TELEPHONE ENCOUNTER
Date of Surgery:    2025     Post Op Appt:  2025 1PM    Case ID: [id number]     Notes:             SURGICAL BOOKING SHEET   Name: Sofiya Martell  MRN: [id number]   : 1940     Surgical Date:    TBD   Surgical Consent:    left endoscopic carpal tunnel release, possible open   Diagnosis:     Left carpal tunnel syndrome G56.02   Workers Comp: No   Procedure Codes:    Endoscopic carpal tunnel release (CPT 22086)   Disposition:    Outpatient   Operative Time:    45 mins   Antibiotics:    None   Anesthesia Type:    Monitored Anesthesia Care (MAC) + Local by Surgeon   Clearance:     MEDICAL CLEARANCE   Equipment:    ECTR: *Please see Dr. Gallegos ECTR Pref Card* [place] Blade, ARTHREX Endoscopic System, Upper Arm 18-inch tourniquet, Local Pre-Mix (1% lidocaine w/ epi, 0.5% marcaine, 50/50 mix and 20cc total), 4-0 moncryl P3, Dermabond, Telfa x2 +Tegaderm x2. Standby: Lead Hand   Assistant:    Assistant Surgery: No   Follow Up:    Follow up 10-14 days after surgery   Pain Medication:    OTC   Therapy:    None

## 2025-01-15 NOTE — TELEPHONE ENCOUNTER
Carpel tunnel surgery with Dr. Gallegos on 2/7/2025  Forms in red folder 1/15/25     Future Appointments   Date Time Provider Department Center   3/13/2025  3:40 PM Kat Banerjee MD [facility]

## 2025-01-16 NOTE — PROGRESS NOTES
Hand Surgery Update Note:     I spoke with the patient's cardiology team this week, and they are planning to proceed with skin punch biopsy to assist in the diagnosis of Sofiya's possible amyloid disease. Sofiya informed me that this is her preference, and that she also strongly prefers to proceed with left endoscopic carpal tunnel release as opposed to open, given that she feels she had a nice and smooth recovery with this same procedure on the right side. She understands there is still a possibility of conversion to open carpal tunnel release intraoperatively if deemed necessary. I will proceed with left endoscopic carpal tunnel release, possible open, as discussed at our prior visit. We will schedule surgery at Sofiya's convenience.     Lm Gallegos MD   Hand, Wrist, & Elbow Surgery  [email or web address]

## 2025-01-22 ENCOUNTER — TELEPHONE (OUTPATIENT)
Dept: NEUROLOGY | Age: 85
End: 2025-01-22

## 2025-01-27 RX ORDER — CHOLECALCIFEROL (VITAMIN D3) 50 MCG
2000 TABLET ORAL DAILY
COMMUNITY

## 2025-01-27 RX ORDER — METOPROLOL SUCCINATE 50 MG/1
50 TABLET, EXTENDED RELEASE ORAL DAILY
COMMUNITY

## 2025-01-27 RX ORDER — GABAPENTIN 300 MG/1
300 CAPSULE ORAL 3 TIMES DAILY
COMMUNITY

## 2025-01-30 ENCOUNTER — OFFICE VISIT (OUTPATIENT)
Dept: INTERNAL MEDICINE CLINIC | Facility: CLINIC | Age: 85
End: 2025-01-30
Payer: MEDICARE

## 2025-01-30 ENCOUNTER — APPOINTMENT (OUTPATIENT)
Dept: NEUROLOGY | Age: 85
End: 2025-01-30

## 2025-01-30 VITALS
SYSTOLIC BLOOD PRESSURE: 116 MMHG | OXYGEN SATURATION: 98 % | WEIGHT: 151.01 LBS | DIASTOLIC BLOOD PRESSURE: 60 MMHG | HEART RATE: 69 BPM | BODY MASS INDEX: 27.79 KG/M2 | HEIGHT: 62 IN

## 2025-01-30 VITALS
OXYGEN SATURATION: 96 % | WEIGHT: 150.63 LBS | TEMPERATURE: 98 F | DIASTOLIC BLOOD PRESSURE: 58 MMHG | HEIGHT: 61 IN | HEART RATE: 68 BPM | SYSTOLIC BLOOD PRESSURE: 118 MMHG | RESPIRATION RATE: 18 BRPM | BODY MASS INDEX: 28.44 KG/M2

## 2025-01-30 DIAGNOSIS — I42.2 HYPERTROPHIC CARDIOMYOPATHY (HCC): ICD-10-CM

## 2025-01-30 DIAGNOSIS — E11.69 TYPE 2 DIABETES MELLITUS WITH OBESITY (HCC): ICD-10-CM

## 2025-01-30 DIAGNOSIS — E66.9 TYPE 2 DIABETES MELLITUS WITH OBESITY (HCC): ICD-10-CM

## 2025-01-30 DIAGNOSIS — F33.9 DEPRESSION, RECURRENT: ICD-10-CM

## 2025-01-30 DIAGNOSIS — I50.32 CHRONIC HEART FAILURE WITH PRESERVED EJECTION FRACTION (HFPEF) (HCC): ICD-10-CM

## 2025-01-30 DIAGNOSIS — G62.9 NEUROPATHY: ICD-10-CM

## 2025-01-30 DIAGNOSIS — I10 PRIMARY HYPERTENSION: ICD-10-CM

## 2025-01-30 DIAGNOSIS — E03.8 OTHER SPECIFIED HYPOTHYROIDISM: ICD-10-CM

## 2025-01-30 DIAGNOSIS — G60.8 OTHER HEREDITARY AND IDIOPATHIC NEUROPATHIES: Primary | ICD-10-CM

## 2025-01-30 DIAGNOSIS — G56.02 LEFT CARPAL TUNNEL SYNDROME: ICD-10-CM

## 2025-01-30 DIAGNOSIS — Z01.818 PRE-OP EXAMINATION: Primary | ICD-10-CM

## 2025-01-30 LAB — HEMOGLOBIN A1C: 5.5 % (ref 4.3–5.6)

## 2025-01-30 PROCEDURE — 11105 PUNCH BX SKIN EA SEP/ADDL: CPT | Performed by: STUDENT IN AN ORGANIZED HEALTH CARE EDUCATION/TRAINING PROGRAM

## 2025-01-30 PROCEDURE — 11104 PUNCH BX SKIN SINGLE LESION: CPT | Performed by: STUDENT IN AN ORGANIZED HEALTH CARE EDUCATION/TRAINING PROGRAM

## 2025-01-30 PROCEDURE — 99214 OFFICE O/P EST MOD 30 MIN: CPT | Performed by: INTERNAL MEDICINE

## 2025-01-30 PROCEDURE — 83036 HEMOGLOBIN GLYCOSYLATED A1C: CPT | Performed by: INTERNAL MEDICINE

## 2025-01-30 PROCEDURE — G2211 COMPLEX E/M VISIT ADD ON: HCPCS | Performed by: INTERNAL MEDICINE

## 2025-01-30 NOTE — PROGRESS NOTES
Sofiya Martell is a 84 year old female.    Chief Complaint   Patient presents with    Pre-Op Exam     Patient here for pre-op for carpel tunnel surgery        HPI:     Patient with hypertrophic cardiomyopathy, s/p ICD, CHF, HTN, HL, diabetes, CKD3, hypothyroidism, anxiety and depression and carpal tunnel syndrome here for pre op exam for left endoscopic carpal tunnel release, possible open on 2/7/2025 requested by Dr. Gallegos. Patient had right side done few months ago with no post op complications. She follows with cardiologist Dr. Blankenship, had labs done last November which were WNL. She has no acute cardiac complaints. Her BG have been in good range, averaging 90s. Hgba1c today is 5.5. ROS negative for fevers, chills, cough, congestion, abdominal pain, nausea, vomiting, focal weakness, or abnormal bleeding.     Patient Active Problem List   Diagnosis    HTN (hypertension)    Internal hemorrhoids without mention of complication    Hypothyroidism    Anxiety and depression    Unspecified hereditary and idiopathic peripheral neuropathy    Osteopenia    Low vitamin B12 level    Hypertrophic cardiomyopathy (HCC)    Bilateral carpal tunnel syndrome    Type 2 diabetes mellitus with obesity ([facility])    Ventricular tachycardia (HCC)    Vitamin D deficiency    Dyslipidemia    Bilateral carotid artery stenosis    Depression, recurrent    COVID-19 virus infection    CKD (chronic kidney disease) stage 3, GFR 30-59 ml/min (HCC)    Chronic heart failure with preserved ejection fraction (HFpEF) ([facility])     Current Outpatient Medications   Medication Sig Dispense Refill    diphenhydrAMINE-APAP, sleep, (TYLENOL PM EXTRA STRENGTH OR) Take 1 tablet by mouth every evening.      gabapentin 300 MG Oral Cap Take 1 capsule (300 mg total) by mouth 3 (three) times daily.      metoprolol succinate ER 50 MG Oral Tablet 24 Hr Take 1 tablet (50 mg total) by mouth daily.      cholecalciferol 50 MCG (2000 UT) Oral Tab Take 1 tablet (2,000 Units  total) by mouth daily.      metFORMIN 500 MG Oral Tab Take 1 tablet (500 mg total) by mouth 2 (two) times daily with meals. (Patient taking differently: Take 1 tablet (500 mg total) by mouth daily with breakfast.) 180 tablet 3    levothyroxine 88 MCG Oral Tab Take 1 tablet (88 mcg total) by mouth before breakfast. Repeat labs in 2 months 90 tablet 3    dapagliflozin 10 MG Oral Tab Take 5 mg by mouth daily.      spironolactone 25 MG Oral Tab Take 0.5 tablets (12.5 mg total) by mouth daily.      furosemide 20 MG Oral Tab TAKE 1/2 TABLET BY MOUTH DAILY AS NEEDED FOR SWELLING OR WEIGHT GAIN      DULoxetine 60 MG Oral Cap DR Particles TAKE 1 CAPSULE(60 MG) BY MOUTH EVERY DAY 90 capsule 3    Glucose Blood (ONETOUCH VERIO) In Vitro Strip 1 strip by In Vitro route daily. 100 strip 1    atorvastatin 40 MG Oral Tab Take 1 tablet (40 mg total) by mouth daily. 90 tablet 1    latanoprost (XALATAN) 0.005 % Ophthalmic Solution Place 1 drop into both eyes nightly.  2    Aspirin 81 MG Oral Tab Take 1 tablet (81 mg total) by mouth daily.        Past Medical History:    Anxiety    Don’t recall    Arrhythmia    Atherosclerosis of coronary artery    Cardiomyopathy (HCC)    Congestive heart disease (HCC)    Depression    Diabetes (HCC)    Disorder of thyroid    Diverticulosis of large intestine    Essential hypertension    Glaucoma    High blood pressure    High cholesterol    Hyperlipidemia    Lumbosacral stenosis    Obesity    Have lost lots of weight    Osteoarthritis    Pacemaker    Presence of cardiac defibrillator    Renal disorder    CKD    Thyroid disease    Visual impairment      Social History:  Social History     Socioeconomic History    Marital status:    Tobacco Use    Smoking status: Former     Current packs/day: 0.00     Average packs/day: 1 pack/day for 1 year (1.0 ttl pk-yrs)     Types: Cigarettes     Quit date: 1985     Years since quittin.1    Smokeless tobacco: Never    Tobacco comments:     Smoked off  and on for20 years Has been 35 years quit.   Vaping Use    Vaping status: Never Used   Substance and Sexual Activity    Alcohol use: Not Currently     Comment: One every two to three years.  Haven’t had any for 3 years    Drug use: Never    Sexual activity: Not Currently   Other Topics Concern    Caffeine Concern Yes     Comment: 5-6 cups a day    Exercise No     Comment: walking      Social Drivers of Health     Physical Activity: High Risk (5/15/2024)    Received from [address]    Exercise Vital Sign     On average, how many days per week do you engage in moderate to strenuous exercise (like a brisk walk)?: 0 days     On average, how many minutes do you engage in exercise at this level?: 0 min    [facility], [facility]    Social Connections    Received from [facility], [facility]    Housing Stability     Family History   Adopted: Yes   Problem Relation Age of Onset    Heart Disorder Father     Heart Disorder Son     Heart Disease Son     Other (subclavian stenosis) Son     Heart Disease Maternal Grandfather         His twin        Allergies  Allergies[1]      REVIEW OF SYSTEMS:   GENERAL HEALTH:  no fevers   RESPIRATORY: no cough  CARDIOVASCULAR: denies chest pain  GI: denies abdominal pain  : no dysuria  NEURO:neuropathy in both hands  PSYCH: +depression   HEME: No adenopathy      EXAM:   /58   Pulse 68   Temp 98 °F (36.7 °C)   Resp 18   Ht 5' 1\" (1.549 m)   Wt 150 lb 9.6 oz (68.3 kg)   LMP  (LMP Unknown)   SpO2 96%   BMI 28.46 kg/m²   GENERAL: well developed, well nourished,in no apparent distress  HEENT: atraumatic, normocephalic  NECK: supple,no adenopathy  LUNGS: normal rate without respiratory distress, lungs clear to auscultation  CARDIO: reg, nl S1 S2  GI: normal bowel sounds, soft, NT/ND  EXTREMITIES: no cyanosis, clubbing   NEURO: Alert and oriented    ASSESSMENT AND PLAN:      Encounter Diagnoses   Name     Pre-op examination- Patient's chronic medical problems are stable. She had labs end of November which were WNL. She is medically cleared for left carpal tunnel release, possible open procedure on 2/7/2025 without the need for further work up at this time. Please call if any questions.     Left carpal tunnel syndrome- plan for carpal tunnel release, possible open procedure on 2/7/25 Dr. Gallegos     Type 2 diabetes mellitus with obesity (HCC)- well controlled on metformin, hgba1c 5.5 today     Other specified hypothyroidism- stable, CPM     Depression, recurrent- stable     Hypertrophic cardiomyopathy (HCC)- stable, she follows with Dr. Blankenship     Primary hypertension- controlled, /58 today     Chronic heart failure with preserved ejection fraction (HFpEF) ([facility])- stable, compensated     Neuropathy- manageable on gabapentin, having work up thru neurology        Orders Placed This Encounter   Procedures    POC Hemoglobin A1C    Microalb/Creat Ratio, Random Urine       Meds & Refills for this Visit:  Requested Prescriptions      No prescriptions requested or ordered in this encounter       Imaging & Consults:  None    Return in about 3 months (around 4/30/2025), or if symptoms worsen or fail to improve, for annual.  There are no Patient Instructions on file for this visit.      The patient indicates understanding of these issues and agrees to the plan.           [1]   Allergies  Allergen Reactions    Adhesive Tape OTHER (SEE COMMENTS)     She developed \"bumps\" at the site       Ace Inhibitors Coughing     CLASS    Latex RASH

## 2025-01-31 ENCOUNTER — E-ADVICE (OUTPATIENT)
Dept: CARDIOLOGY | Age: 85
End: 2025-01-31

## 2025-01-31 ENCOUNTER — TELEPHONE (OUTPATIENT)
Dept: CARDIOLOGY | Age: 85
End: 2025-01-31

## 2025-01-31 ENCOUNTER — TELEPHONE (OUTPATIENT)
Dept: INTERNAL MEDICINE CLINIC | Facility: CLINIC | Age: 85
End: 2025-01-31

## 2025-01-31 NOTE — TELEPHONE ENCOUNTER
Fax received from pre admission testing stating that patient is scheduled for left endoscopic carpal tunnel release on 2/7/25 and needs a BMP (requires a 4 hour fast) within 60 days of this procedure. Form placed in Dr. Banerjee's form folder at the .

## 2025-02-04 ENCOUNTER — APPOINTMENT (OUTPATIENT)
Dept: CARDIOLOGY | Age: 85
End: 2025-02-04

## 2025-02-04 ENCOUNTER — APPOINTMENT (OUTPATIENT)
Dept: CARDIOLOGY | Age: 85
End: 2025-02-04
Attending: INTERNAL MEDICINE

## 2025-02-04 ENCOUNTER — LAB ENCOUNTER (OUTPATIENT)
Dept: LAB | Age: 85
End: 2025-02-04
Attending: STUDENT IN AN ORGANIZED HEALTH CARE EDUCATION/TRAINING PROGRAM
Payer: MEDICARE

## 2025-02-04 VITALS
DIASTOLIC BLOOD PRESSURE: 72 MMHG | HEART RATE: 60 BPM | BODY MASS INDEX: 27.79 KG/M2 | HEIGHT: 62 IN | SYSTOLIC BLOOD PRESSURE: 124 MMHG | WEIGHT: 151 LBS

## 2025-02-04 DIAGNOSIS — Z01.818 PRE-OP TESTING: ICD-10-CM

## 2025-02-04 DIAGNOSIS — Z95.810 ICD (IMPLANTABLE CARDIOVERTER-DEFIBRILLATOR) IN PLACE: ICD-10-CM

## 2025-02-04 DIAGNOSIS — Z95.810 ICD (IMPLANTABLE CARDIOVERTER-DEFIBRILLATOR) IN PLACE: Primary | ICD-10-CM

## 2025-02-04 LAB
ANION GAP SERPL CALC-SCNC: 9 MMOL/L (ref 0–18)
BUN BLD-MCNC: 13 MG/DL (ref 9–23)
CALCIUM BLD-MCNC: 9.7 MG/DL (ref 8.7–10.6)
CHLORIDE SERPL-SCNC: 104 MMOL/L (ref 98–112)
CO2 SERPL-SCNC: 27 MMOL/L (ref 21–32)
CREAT BLD-MCNC: 0.89 MG/DL
EGFRCR SERPLBLD CKD-EPI 2021: 65 ML/MIN/1.73M2 (ref 60–?)
FASTING STATUS PATIENT QL REPORTED: YES
GLUCOSE BLD-MCNC: 92 MG/DL (ref 70–99)
OSMOLALITY SERPL CALC.SUM OF ELEC: 290 MOSM/KG (ref 275–295)
POTASSIUM SERPL-SCNC: 4.4 MMOL/L (ref 3.5–5.1)
SODIUM SERPL-SCNC: 140 MMOL/L (ref 136–145)

## 2025-02-04 PROCEDURE — 99213 OFFICE O/P EST LOW 20 MIN: CPT | Performed by: INTERNAL MEDICINE

## 2025-02-04 PROCEDURE — 93290 INTERROG DEV EVAL ICPMS IP: CPT | Performed by: INTERNAL MEDICINE

## 2025-02-04 PROCEDURE — 93283 PRGRMG EVAL IMPLANTABLE DFB: CPT | Performed by: INTERNAL MEDICINE

## 2025-02-04 PROCEDURE — 80048 BASIC METABOLIC PNL TOTAL CA: CPT

## 2025-02-04 PROCEDURE — 36415 COLL VENOUS BLD VENIPUNCTURE: CPT

## 2025-02-04 SDOH — HEALTH STABILITY: PHYSICAL HEALTH: ON AVERAGE, HOW MANY MINUTES DO YOU ENGAGE IN EXERCISE AT THIS LEVEL?: 0 MIN

## 2025-02-04 SDOH — HEALTH STABILITY: PHYSICAL HEALTH: ON AVERAGE, HOW MANY DAYS PER WEEK DO YOU ENGAGE IN MODERATE TO STRENUOUS EXERCISE (LIKE A BRISK WALK)?: 0 DAYS

## 2025-02-04 ASSESSMENT — PATIENT HEALTH QUESTIONNAIRE - PHQ9
2. FEELING DOWN, DEPRESSED OR HOPELESS: NOT AT ALL
CLINICAL INTERPRETATION OF PHQ2 SCORE: NO FURTHER SCREENING NEEDED
SUM OF ALL RESPONSES TO PHQ9 QUESTIONS 1 AND 2: 0
SUM OF ALL RESPONSES TO PHQ9 QUESTIONS 1 AND 2: 0
1. LITTLE INTEREST OR PLEASURE IN DOING THINGS: NOT AT ALL
IS THE PATIENT ABLE TO COGNITIVELY COMPLETE THE PHQ9: NO

## 2025-02-05 LAB
MDC_IDC_LEAD_CONNECTION_STATUS: NORMAL
MDC_IDC_LEAD_CONNECTION_STATUS: NORMAL
MDC_IDC_LEAD_IMPLANT_DT: NORMAL
MDC_IDC_LEAD_IMPLANT_DT: NORMAL
MDC_IDC_LEAD_LOCATION: NORMAL
MDC_IDC_LEAD_LOCATION: NORMAL
MDC_IDC_LEAD_LOCATION_DETAIL_1: NORMAL
MDC_IDC_LEAD_LOCATION_DETAIL_1: NORMAL
MDC_IDC_LEAD_MFG: NORMAL
MDC_IDC_LEAD_MFG: NORMAL
MDC_IDC_LEAD_MODEL: NORMAL
MDC_IDC_LEAD_MODEL: NORMAL
MDC_IDC_LEAD_SERIAL: NORMAL
MDC_IDC_LEAD_SERIAL: NORMAL
MDC_IDC_MSMT_BATTERY_REMAINING_LONGEVITY: 32 MO
MDC_IDC_MSMT_CAP_CHARGE_TIME: 7.75 S
MDC_IDC_MSMT_CAP_CHARGE_TYPE: NORMAL
MDC_IDC_MSMT_LEADCHNL_RA_IMPEDANCE_VALUE: 412.5 OHM
MDC_IDC_MSMT_LEADCHNL_RA_PACING_THRESHOLD_AMPLITUDE: 0.75 V
MDC_IDC_MSMT_LEADCHNL_RA_PACING_THRESHOLD_AMPLITUDE: 0.75 V
MDC_IDC_MSMT_LEADCHNL_RA_PACING_THRESHOLD_PULSEWIDTH: 0.5 MS
MDC_IDC_MSMT_LEADCHNL_RA_PACING_THRESHOLD_PULSEWIDTH: 0.5 MS
MDC_IDC_MSMT_LEADCHNL_RA_SENSING_INTR_AMPL: 2.6 MV
MDC_IDC_MSMT_LEADCHNL_RV_IMPEDANCE_VALUE: 387.5 OHM
MDC_IDC_MSMT_LEADCHNL_RV_PACING_THRESHOLD_AMPLITUDE: 1 V
MDC_IDC_MSMT_LEADCHNL_RV_PACING_THRESHOLD_AMPLITUDE: 1 V
MDC_IDC_MSMT_LEADCHNL_RV_PACING_THRESHOLD_PULSEWIDTH: 0.5 MS
MDC_IDC_MSMT_LEADCHNL_RV_PACING_THRESHOLD_PULSEWIDTH: 0.5 MS
MDC_IDC_MSMT_LEADCHNL_RV_SENSING_INTR_AMPL: 9.2 MV
MDC_IDC_PG_IMPLANT_DTM: NORMAL
MDC_IDC_PG_MFG: NORMAL
MDC_IDC_PG_MODEL: NORMAL
MDC_IDC_PG_SERIAL: NORMAL
MDC_IDC_PG_TYPE: NORMAL
MDC_IDC_SESS_CLINIC_NAME: NORMAL
MDC_IDC_SESS_DTM: NORMAL
MDC_IDC_SESS_TYPE: NORMAL
MDC_IDC_SET_BRADY_AT_MODE_SWITCH_MODE: NORMAL
MDC_IDC_SET_BRADY_AT_MODE_SWITCH_RATE: 180 {BEATS}/MIN
MDC_IDC_SET_BRADY_HYSTRATE: NORMAL
MDC_IDC_SET_BRADY_LOWRATE: 60 {BEATS}/MIN
MDC_IDC_SET_BRADY_MAX_SENSOR_RATE: 110 {BEATS}/MIN
MDC_IDC_SET_BRADY_MAX_TRACKING_RATE: 120 {BEATS}/MIN
MDC_IDC_SET_BRADY_MODE: NORMAL
MDC_IDC_SET_BRADY_NIGHT_RATE: NORMAL
MDC_IDC_SET_BRADY_PAV_DELAY_LOW: 250 MS
MDC_IDC_SET_BRADY_SAV_DELAY_LOW: 250 MS
MDC_IDC_SET_LEADCHNL_RA_PACING_AMPLITUDE: 2 V
MDC_IDC_SET_LEADCHNL_RA_PACING_CAPTURE_MODE: NORMAL
MDC_IDC_SET_LEADCHNL_RA_PACING_POLARITY: NORMAL
MDC_IDC_SET_LEADCHNL_RA_PACING_PULSEWIDTH: 0.5 MS
MDC_IDC_SET_LEADCHNL_RA_SENSING_ADAPTATION_MODE: NORMAL
MDC_IDC_SET_LEADCHNL_RA_SENSING_POLARITY: NORMAL
MDC_IDC_SET_LEADCHNL_RA_SENSING_SENSITIVITY: 0.3 MV
MDC_IDC_SET_LEADCHNL_RV_PACING_AMPLITUDE: 2.5 V
MDC_IDC_SET_LEADCHNL_RV_PACING_CAPTURE_MODE: NORMAL
MDC_IDC_SET_LEADCHNL_RV_PACING_POLARITY: NORMAL
MDC_IDC_SET_LEADCHNL_RV_PACING_PULSEWIDTH: 0.5 MS
MDC_IDC_SET_LEADCHNL_RV_SENSING_POLARITY: NORMAL
MDC_IDC_SET_LEADCHNL_RV_SENSING_SENSITIVITY: 0.5 MV
MDC_IDC_SET_ZONE_DETECTION_BEATS_DENOMINATOR: 24 {BEATS}
MDC_IDC_SET_ZONE_DETECTION_BEATS_NUMERATOR: 24 {BEATS}
MDC_IDC_SET_ZONE_DETECTION_INTERVAL: 300 MS
MDC_IDC_SET_ZONE_STATUS: NORMAL
MDC_IDC_SET_ZONE_TYPE: NORMAL
MDC_IDC_SET_ZONE_VENDOR_TYPE: NORMAL
MDC_IDC_STAT_AT_BURDEN_PERCENT: 0 %
MDC_IDC_STAT_AT_DTM_END: NORMAL
MDC_IDC_STAT_AT_DTM_START: NORMAL
MDC_IDC_STAT_AT_MODE_SW_COUNT: 1129
MDC_IDC_STAT_BRADY_DTM_END: NORMAL
MDC_IDC_STAT_BRADY_DTM_START: NORMAL
MDC_IDC_STAT_BRADY_RA_PERCENT_PACED: 37 %
MDC_IDC_STAT_BRADY_RV_PERCENT_PACED: 0.56 %
MDC_IDC_STAT_CRT_DTM_END: NORMAL
MDC_IDC_STAT_CRT_DTM_START: NORMAL
MDC_IDC_STAT_EPISODE_RECENT_COUNT: 0
MDC_IDC_STAT_EPISODE_RECENT_COUNT: 0
MDC_IDC_STAT_EPISODE_RECENT_COUNT_DTM_END: NORMAL
MDC_IDC_STAT_EPISODE_RECENT_COUNT_DTM_END: NORMAL
MDC_IDC_STAT_EPISODE_RECENT_COUNT_DTM_START: NORMAL
MDC_IDC_STAT_EPISODE_RECENT_COUNT_DTM_START: NORMAL
MDC_IDC_STAT_EPISODE_TYPE: NORMAL
MDC_IDC_STAT_EPISODE_TYPE: NORMAL
MDC_IDC_STAT_EPISODE_VENDOR_TYPE: NORMAL
MDC_IDC_STAT_HEART_RATE_DTM_END: NORMAL
MDC_IDC_STAT_HEART_RATE_DTM_START: NORMAL
MDC_IDC_STAT_TACHYTHERAPY_ATP_DELIVERED_RECENT: 0
MDC_IDC_STAT_TACHYTHERAPY_RECENT_DTM_END: NORMAL
MDC_IDC_STAT_TACHYTHERAPY_RECENT_DTM_START: NORMAL
MDC_IDC_STAT_TACHYTHERAPY_SHOCKS_ABORTED_RECENT: 0
MDC_IDC_STAT_TACHYTHERAPY_SHOCKS_DELIVERED_RECENT: 0

## 2025-02-07 ENCOUNTER — HOSPITAL ENCOUNTER (OUTPATIENT)
Facility: HOSPITAL | Age: 85
Setting detail: HOSPITAL OUTPATIENT SURGERY
Discharge: HOME | End: 2025-02-07
Attending: STUDENT IN AN ORGANIZED HEALTH CARE EDUCATION/TRAINING PROGRAM | Admitting: STUDENT IN AN ORGANIZED HEALTH CARE EDUCATION/TRAINING PROGRAM
Payer: MEDICARE

## 2025-02-07 ENCOUNTER — ANESTHESIA EVENT (OUTPATIENT)
Dept: SURGERY | Facility: HOSPITAL | Age: 85
End: 2025-02-07
Payer: MEDICARE

## 2025-02-07 ENCOUNTER — ANESTHESIA (OUTPATIENT)
Dept: SURGERY | Facility: HOSPITAL | Age: 85
End: 2025-02-07
Payer: MEDICARE

## 2025-02-07 VITALS
TEMPERATURE: 98 F | BODY MASS INDEX: 28.55 KG/M2 | WEIGHT: 151.19 LBS | OXYGEN SATURATION: 92 % | HEART RATE: 60 BPM | HEIGHT: 61 IN | DIASTOLIC BLOOD PRESSURE: 54 MMHG | RESPIRATION RATE: 16 BRPM | SYSTOLIC BLOOD PRESSURE: 104 MMHG

## 2025-02-07 DIAGNOSIS — Z01.818 PRE-OP TESTING: Primary | ICD-10-CM

## 2025-02-07 LAB — GLUCOSE BLD-MCNC: 88 MG/DL (ref 70–99)

## 2025-02-07 PROCEDURE — 82962 GLUCOSE BLOOD TEST: CPT

## 2025-02-07 PROCEDURE — 01N54ZZ RELEASE MEDIAN NERVE, PERCUTANEOUS ENDOSCOPIC APPROACH: ICD-10-PCS | Performed by: STUDENT IN AN ORGANIZED HEALTH CARE EDUCATION/TRAINING PROGRAM

## 2025-02-07 RX ORDER — NICOTINE POLACRILEX 4 MG
30 LOZENGE BUCCAL
Status: DISCONTINUED | OUTPATIENT
Start: 2025-02-07 | End: 2025-02-07

## 2025-02-07 RX ORDER — HYDROCODONE BITARTRATE AND ACETAMINOPHEN 5; 325 MG/1; MG/1
2 TABLET ORAL ONCE AS NEEDED
Status: DISCONTINUED | OUTPATIENT
Start: 2025-02-07 | End: 2025-02-07

## 2025-02-07 RX ORDER — ONDANSETRON 2 MG/ML
4 INJECTION INTRAMUSCULAR; INTRAVENOUS EVERY 6 HOURS PRN
Status: DISCONTINUED | OUTPATIENT
Start: 2025-02-07 | End: 2025-02-07

## 2025-02-07 RX ORDER — ACETAMINOPHEN 500 MG
1000 TABLET ORAL ONCE
Status: DISCONTINUED | OUTPATIENT
Start: 2025-02-07 | End: 2025-02-07

## 2025-02-07 RX ORDER — NICOTINE POLACRILEX 4 MG
15 LOZENGE BUCCAL
Status: DISCONTINUED | OUTPATIENT
Start: 2025-02-07 | End: 2025-02-07

## 2025-02-07 RX ORDER — METOCLOPRAMIDE HYDROCHLORIDE 5 MG/ML
5 INJECTION INTRAMUSCULAR; INTRAVENOUS EVERY 8 HOURS PRN
Status: DISCONTINUED | OUTPATIENT
Start: 2025-02-07 | End: 2025-02-07

## 2025-02-07 RX ORDER — HYDROCODONE BITARTRATE AND ACETAMINOPHEN 5; 325 MG/1; MG/1
1 TABLET ORAL ONCE AS NEEDED
Status: DISCONTINUED | OUTPATIENT
Start: 2025-02-07 | End: 2025-02-07

## 2025-02-07 RX ORDER — NALOXONE HYDROCHLORIDE 0.4 MG/ML
0.08 INJECTION, SOLUTION INTRAMUSCULAR; INTRAVENOUS; SUBCUTANEOUS AS NEEDED
Status: DISCONTINUED | OUTPATIENT
Start: 2025-02-07 | End: 2025-02-07

## 2025-02-07 RX ORDER — ONDANSETRON 2 MG/ML
INJECTION INTRAMUSCULAR; INTRAVENOUS AS NEEDED
Status: DISCONTINUED | OUTPATIENT
Start: 2025-02-07 | End: 2025-02-07 | Stop reason: SURG

## 2025-02-07 RX ORDER — SODIUM CHLORIDE, SODIUM LACTATE, POTASSIUM CHLORIDE, CALCIUM CHLORIDE 600; 310; 30; 20 MG/100ML; MG/100ML; MG/100ML; MG/100ML
INJECTION, SOLUTION INTRAVENOUS CONTINUOUS
Status: DISCONTINUED | OUTPATIENT
Start: 2025-02-07 | End: 2025-02-07

## 2025-02-07 RX ORDER — DEXTROSE MONOHYDRATE 25 G/50ML
50 INJECTION, SOLUTION INTRAVENOUS
Status: DISCONTINUED | OUTPATIENT
Start: 2025-02-07 | End: 2025-02-07

## 2025-02-07 RX ORDER — ACETAMINOPHEN 500 MG
1000 TABLET ORAL ONCE AS NEEDED
Status: DISCONTINUED | OUTPATIENT
Start: 2025-02-07 | End: 2025-02-07

## 2025-02-07 RX ORDER — LIDOCAINE HYDROCHLORIDE 10 MG/ML
INJECTION, SOLUTION EPIDURAL; INFILTRATION; INTRACAUDAL; PERINEURAL AS NEEDED
Status: DISCONTINUED | OUTPATIENT
Start: 2025-02-07 | End: 2025-02-07 | Stop reason: SURG

## 2025-02-07 RX ADMIN — ONDANSETRON 4 MG: 2 INJECTION INTRAMUSCULAR; INTRAVENOUS at 09:32:00

## 2025-02-07 RX ADMIN — LIDOCAINE HYDROCHLORIDE 50 MG: 10 INJECTION, SOLUTION EPIDURAL; INFILTRATION; INTRACAUDAL; PERINEURAL at 09:30:00

## 2025-02-07 RX ADMIN — SODIUM CHLORIDE, SODIUM LACTATE, POTASSIUM CHLORIDE, CALCIUM CHLORIDE: 600; 310; 30; 20 INJECTION, SOLUTION INTRAVENOUS at 09:27:00

## 2025-02-07 RX ADMIN — SODIUM CHLORIDE, SODIUM LACTATE, POTASSIUM CHLORIDE, CALCIUM CHLORIDE: 600; 310; 30; 20 INJECTION, SOLUTION INTRAVENOUS at 10:05:00

## 2025-02-07 NOTE — ANESTHESIA POSTPROCEDURE EVALUATION
[facility]    Sofiya Martell Patient Status:  Hospital Outpatient Surgery   Age/Gender 84 year old female MRN [id number]   Location [facility] SURGERY Attending Lm Gallegos MD   Hosp Day # 0 PCP Kat Banerjee MD       Anesthesia Post-op Note    left endoscopic carpal tunnel release    Procedure Summary       Date: 02/07/25 Room / Location:  MAIN OR 06 /  MAIN OR    Anesthesia Start: 0927 Anesthesia Stop: 1011    Procedure: left endoscopic carpal tunnel release (Left: Hand) Diagnosis:       Carpal tunnel syndrome on left      (Carpal tunnel syndrome on left [G56.02])    Surgeons: Lm Gallegos MD Anesthesiologist: Anton Fleming MD    Anesthesia Type: MAC ASA Status: 3            Anesthesia Type: MAC    Vitals Value Taken Time   BP 88/57 02/07/25 1012   Temp 97.8 02/07/25 1012   Pulse 51 02/07/25 1012   Resp 14 02/07/25 1012   SpO2 97 02/07/25 1012           Patient Location: Same Day Surgery    Anesthesia Type: MAC    Airway Patency: patent    Postop Pain Control: adequate    Mental Status: preanesthetic baseline    Nausea/Vomiting: none    Cardiopulmonary/Hydration status: stable euvolemic    Complications: no apparent anesthesia related complications    Postop vital signs: stable    Dental Exam: Unchanged from Preop    Patient to be discharged home when criteria met.

## 2025-02-07 NOTE — DISCHARGE INSTRUCTIONS
SCARLETT XIONG MD  PATIENT DISCHARGE INSTRUCTIONS    POST PROCEDURE CARE AND INFECTION PREVENTION:  Dressing: Keep the dressing on until post op day 5. If you have steri-strips under your dressing, please keep those in place until they fall off on their own. Do not peel them off. If you do not have steri-strips over your incision, keep incision covered with band aid until follow up. OK to shower and run water over the incision beginning post op day 5. Do not soak the incision in pools or tubs until you are instructed by your doctor.   Weight Bearing: No lifting greater than 1 pound.    Activity: Resume your normal activities of daily living within the 1 pound lifting restriction.   Pain: Ice and elevate extremity to minimize swelling. Take acetaminophen and ibuprofen for pain.  Follow up: Call for follow-up appointment if you do not have one.     MEDICATION:  See Medication Reconciliation Form for any new prescriptions.    You may resume your usual medications unless instructed otherwise.  Do not drive, operate machinery or drink alcoholic beverages while taking narcotic pain medication.  Narcotic pain medication may cause constipation.  If no bowel movement in 48 hours, please contact your physician.    IN CASE OF EMERGENCY:  If you are unable to reach your doctor, call or go to the nearest emergency room.     INSTRUCTIONS FOR PATIENTS WITH GENERAL / IV SEDATION ONLY:  Begin with clear liquids then progress to your normal diet if not nauseated.  Nausea and vomiting occasionally occur after surgery.  If you are nauseated, remain on clear liquids until it passes.  If it should persist for any length of time at home, notify your doctor.  Rest on the day of surgery.  You may increase activity as tolerated starting tomorrow.  Do not drive, operate hazardous machinery, or make important personal or legal decisions for 24 hours.  You may feel dizzy or lightheaded from anesthesia.  Move cautiously for 24 hours.       Lm Gallegos MD   Hand, Wrist, & Elbow Surgery  [email or web address]

## 2025-02-07 NOTE — ANESTHESIA PREPROCEDURE EVALUATION
PRE-OP EVALUATION    Patient Name: Sofiya Martell    Admit Diagnosis: Carpal tunnel syndrome on left [G56.02]    Pre-op Diagnosis: Carpal tunnel syndrome on left [G56.02]    left endoscopic carpal tunnel release, possible open    Anesthesia Procedure: left endoscopic carpal tunnel release, possible open (Left)    Surgeons and Role:     * Lm Gallegos MD - Primary    Pre-op vitals reviewed.  Temp: 98 °F (36.7 °C)  Pulse: 59  Resp: 16  BP: 135/74  SpO2: 97 %  Body mass index is 28.57 kg/m².    Current medications reviewed.  Hospital Medications:  • [Transfer Hold] acetaminophen (Tylenol Extra Strength) tab 1,000 mg  1,000 mg Oral Once   • [Transfer Hold] glucose (Dex4) 15 GM/59ML oral liquid 15 g  15 g Oral Q15 Min PRN    Or   • [Transfer Hold] glucose (Glutose) 40% oral gel 15 g  15 g Oral Q15 Min PRN    Or   • [Transfer Hold] glucose-vitamin C (Dex-4) chewable tab 4 tablet  4 tablet Oral Q15 Min PRN    Or   • [Transfer Hold] dextrose 50% injection 50 mL  50 mL Intravenous Q15 Min PRN    Or   • [Transfer Hold] glucose (Dex4) 15 GM/59ML oral liquid 30 g  30 g Oral Q15 Min PRN    Or   • [Transfer Hold] glucose (Glutose) 40% oral gel 30 g  30 g Oral Q15 Min PRN    Or   • [Transfer Hold] glucose-vitamin C (Dex-4) chewable tab 8 tablet  8 tablet Oral Q15 Min PRN   • lactated ringers infusion   Intravenous Continuous   • ceFAZolin (Ancef) 2g in 10mL IV syringe premix  2 g Intravenous Once   • bupivacaine/lidocaine w Epi/sodium bicarbonate local mixture 10mL syringe   Infiltration Once (Intra-Op)       Outpatient Medications:   Prescriptions Prior to Admission[1]    Allergies: Adhesive tape, Ace inhibitors, and Latex      Anesthesia Evaluation    Patient summary reviewed.    Anesthetic Complications  (-) history of anesthetic complications         GI/Hepatic/Renal    Negative GI/hepatic/renal ROS.         (+) chronic renal disease and CRI                   Cardiovascular      ECG reviewed.  Exercise tolerance:  good     MET: >4      (+) hypertension   (+) hyperlipidemia  (+) CAD      (+) pacemaker/AICD       (+) dysrhythmias   (+) CHF                Endo/Other      (+) diabetes     (+) hypothyroidism                       Pulmonary    Negative pulmonary ROS.                       Neuro/Psych      (+) depression  (+) anxiety                          Past Surgical History:   Procedure Laterality Date   • Cardiac pacemaker placement     • Carpal tunnel release Right    • Cataract     • Colonoscopy     • Colonoscopy  10 years ago   • D & c  Two times   •   Twice. 3/61,    • Other surgical history  Defibrillator    Defibrillator and pacemaker   • Pacemaker/defibrillator  2018    [facility]     Social History     Socioeconomic History   • Marital status:    Tobacco Use   • Smoking status: Former     Current packs/day: 0.00     Average packs/day: 1 pack/day for 1 year (1.0 ttl pk-yrs)     Types: Cigarettes     Quit date: 1985     Years since quittin.1   • Smokeless tobacco: Never   • Tobacco comments:     Smoked off and on for20 years Has been 35 years quit.   Vaping Use   • Vaping status: Never Used   Substance and Sexual Activity   • Alcohol use: Not Currently     Comment: One every two to three years.  Haven’t had any for 3 years   • Drug use: Never   • Sexual activity: Not Currently   Other Topics Concern   • Caffeine Concern Yes     Comment: 5-6 cups a day   • Exercise No     Comment: walking      History   Drug Use Unknown     Available pre-op labs reviewed.     Lab Results   Component Value Date     2025    K 4.4 2025     2025    CO2 27.0 2025    BUN 13 2025    CREATSERUM 0.89 2025    GLU 92 2025    CA 9.7 2025            Airway      Mallampati: II  Mouth opening: >3 FB  TM distance: > 6 cm  Neck ROM: full Cardiovascular    Cardiovascular exam normal.  Rhythm: regular  Rate: normal     Dental             Pulmonary    Pulmonary exam  normal.  Breath sounds clear to auscultation bilaterally.               Other findings        ASA: 3   Plan: MAC  NPO status verified and patient meets guidelines.    Post-procedure pain management plan discussed with surgeon and patient.      Plan/risks discussed with: patient            Present on Admission:  **None**             [1]   Medications Prior to Admission   Medication Sig Dispense Refill Last Dose/Taking   • diphenhydrAMINE-APAP, sleep, (TYLENOL PM EXTRA STRENGTH OR) Take 1 tablet by mouth every evening.   2/6/2025 Bedtime   • gabapentin 300 MG Oral Cap Take 1 capsule (300 mg total) by mouth 3 (three) times daily.   2/7/2025 at  5:00 AM   • metoprolol succinate ER 50 MG Oral Tablet 24 Hr Take 1 tablet (50 mg total) by mouth daily.   2/6/2025 at  9:00 PM   • cholecalciferol 50 MCG (2000 UT) Oral Tab Take 1 tablet (2,000 Units total) by mouth daily.   1/31/2025   • metFORMIN 500 MG Oral Tab Take 1 tablet (500 mg total) by mouth 2 (two) times daily with meals. (Patient taking differently: Take 1 tablet (500 mg total) by mouth daily with breakfast.) 180 tablet 3 2/6/2025 Morning   • levothyroxine 88 MCG Oral Tab Take 1 tablet (88 mcg total) by mouth before breakfast. Repeat labs in 2 months 90 tablet 3 2/7/2025 at  5:00 AM   • dapagliflozin 10 MG Oral Tab Take 5 mg by mouth daily.   2/2/2025   • spironolactone 25 MG Oral Tab Take 0.5 tablets (12.5 mg total) by mouth daily.   2/5/2025   • DULoxetine 60 MG Oral Cap DR Particles TAKE 1 CAPSULE(60 MG) BY MOUTH EVERY DAY 90 capsule 3 2/7/2025 at  5:00 AM   • atorvastatin 40 MG Oral Tab Take 1 tablet (40 mg total) by mouth daily. 90 tablet 1 2/6/2025 Evening   • latanoprost (XALATAN) 0.005 % Ophthalmic Solution Place 1 drop into both eyes nightly.  2 2/5/2025   • Aspirin 81 MG Oral Tab Take 1 tablet (81 mg total) by mouth daily.   2/1/2025   • furosemide 20 MG Oral Tab TAKE 1/2 TABLET BY MOUTH DAILY AS NEEDED FOR SWELLING OR WEIGHT GAIN   2/5/2025   • Glucose  Blood (ONETOUCH VERIO) In Vitro Strip 1 strip by In Vitro route daily. 100 strip 1 More than a month   • [] OneTouch Delica Lancets 33G Does not apply Misc 1 Lancet by Finger stick route daily as needed. 100 each 1

## 2025-02-07 NOTE — INTERVAL H&P NOTE
Pre-op Diagnosis: Carpal tunnel syndrome on left [G56.02]    The above referenced H&P was reviewed by Lm Gallegos MD on 2/7/2025, the patient was examined and no significant changes have occurred in the patient's condition since the H&P was performed.  I discussed with the patient and/or legal representative the potential benefits, risks and side effects of this procedure; the likelihood of the patient achieving goals; and potential problems that might occur during recuperation.  I discussed reasonable alternatives to the procedure, including risks, benefits and side effects related to the alternatives and risks related to not receiving this procedure.  We will proceed with procedure as planned.    CV: regular rate  Lungs: nonlabored respirations    Surgical Procedure Consent: Left endoscopic carpal tunnel release, possible open  The patient was indicated for carpal tunnel release as patient had failed nonoperative management of night splinting, rest, activity modifications, therapy, nerve glides, NSAIDs or other appropriate nonoperative care. The symptoms were progressive and more severe and patient did not want to continue with nonoperative care and wanted surgery at this point. Alternatives to continued nonoperative care, however, were discussed and offered.     Today, I discussed with Sofiya Martell the surgical procedure consent at length. I reviewed the nature of the condition, the indications for surgery, the procedure itself, the post-operative course, the expected outcomes as well as the surgical risks and alternatives. Potential benefits, the main goal of nerve decompression of surgery, are to halt progression. Any reversal of this would be added benefit on top of this. There was no guarantee of the outcome. The potential risks were again discussed with the patient which include, but are not limited to, bleeding, infection, nerve or artery damage, anesthesia or wound healing problems, continued  muscle atrophy, continued paresthesias or pain, need for further surgery, bleeding, and complex regional pain syndrome. The risks and benefits of endoscopic as well as open carpal tunnel release surgery were both discussed, as well as the possibility of converting to an open carpal tunnel release if deemed necessary intraoperatively during the endoscopic portion. Sofiya Martell demonstrated understanding and asked appropriate questions which I answered to their satisfaction. Following this discussion, Sofiya Martell wished to proceed with surgical intervention.

## 2025-02-08 NOTE — OPERATIVE REPORT
Operative Report     Patient Name: Sofiya Martell    2/7/2025    Preoperative Diagnosis:     Carpal tunnel syndrome on left [G56.02]    Postoperative Diagnosis:     Same as above    Surgeons and Role:     * Lm Gallegos MD - Primary     Assistant: Erich Carrasco SA    Procedures:     Left endoscopic carpal tunnel release (CPT 05315)    Antibiotics: None    Surgical Findings: Thickened transverse carpal ligament    Anesthesia:   MAC sedation  Local pre-mix: 4.5cc marcaine 0.5%, 4.5cc lidocaine 1% with epinephrine 1:100,000, 8.4% sodium bicarbonate 1cc  Total Local Used: 10cc    Complications: None    Condition: Stable    Estimated Blood Loss: Minimal    Tourniquet Time: 22 min at 250mm Hg, left upper arm    Indications:  84 year old female was indicated for left endoscopic carpal tunnel release as patient had failed nonoperative management of night splinting, rest, activity modifications, therapy, nerve glides, NSAIDs or other appropriate nonoperative care. The symptoms were progressive and more severe and patient did not want to continue with nonoperative care and wanted surgery at this point. Alternatives to continued nonoperative care, however, were discussed and offered.     Today, I discussed with Sofiya Martell the surgical procedure consent at length. I reviewed the nature of the condition, the indications for surgery, the procedure itself, the post-operative course, the expected outcomes as well as the surgical risks and alternatives. Potential benefits, the main goal of nerve decompression of surgery, are to halt progression. Any reversal of this would be added benefit on top of this. There was no guarantee of the outcome. The potential risks were again discussed with the patient which include, but are not limited to, bleeding, infection, nerve or artery damage, anesthesia or wound healing problems, continued muscle atrophy, continued paresthesias or pain, need for further surgery,  bleeding, complex regional pain syndrome, and anesthesia risks. The risks and benefits of endoscopic as well as open carpal tunnel release surgery were both discussed, as well as the possibility of converting to an open carpal tunnel release if deemed necessary intraoperatively during the endoscopic portion. Sofiya Martell demonstrated understanding and asked appropriate questions which I answered to their satisfaction. Following this discussion, Sofiya Martell wished to proceed with surgical intervention in the form of left carpal tunnel release.    Procedure:  Patient was met in the preoperative holding area where consent was verified, laterality was marked with the surgeon's initials, and the H&P was updated. Patient was brought to the operating room on a transport cart. Patient was then transferred onto the operating room table and placed in supine position with an arm table and with bony prominences well-padded.  SCDs were placed. MAC anesthesia was administered by the anesthesiologist, and local anesthesia administered by me. An upper arm tourniquet was placed and the limb was exsanguinated using Esmarch bandage.The arm was then prepped and draped in the usual sterile fashion. A surgical timeout was performed. Tourniquet was raised to 250mmHg.    A transverse incision through the dermis was made at the the distal volar wrist crease just ulnar to the palmaris longus using a 15 blade. Adson's and Hodge scissors were used to dissect through the subcutaneous tissue onto the antebrachial fascia.  A distally based 1 cm wide rectangular flap was elevated using a [place] blade. The flap was retracted distally allowing access to the carpal tunnel.  The undersurface of the transverse carpal ligament was accessed, cleaned, and dilated.  The Book&Table endoscopic apparatus with camera was inserted into the carpal tunnel.  Under camera magnification with direct visualization of the undersurface of the  transverse carpal ligament, the blade was engaged at the distal extent of the ligament and the release of the ligament was carried out distal to proximal. I verified the release with the endoscopy camera noting divergent edges of the transverse carpal ligament. I also physically verified with a tenosynovium elevator complete release of the transverse carpal ligament. Next, under direct visualization, the proximal antebrachial fascia was completely released.     The tourniquet was then lowered and bipolar cautery was used to achieve hemostasis.       Closure:  The incision was closed using 4-0 Monocryl in a buried simple interrupted fashion. Dermabond and Steri-Strips were placed.     Dressing/Splint:  Telfa & Tegaderm were applied over the endoscopic carpal tunnel incision.     Post Operative:  Patient was woken up from anesthesia and taken to PACU for further recovery and discharged home.    I was present for and participated in the entirety of the procedure.    Lm Gallegos MD   Hand, Wrist, & Elbow Surgery  [email or web address]

## 2025-02-10 ENCOUNTER — DOCUMENTATION ONLY (OUTPATIENT)
Dept: ORTHOPEDICS CLINIC | Facility: CLINIC | Age: 85
End: 2025-02-10

## 2025-02-10 NOTE — PROGRESS NOTES
Post-Op Telephone Call Note:    Procedure: Left endoscopic carpal tunnel release    Date of Procedure: 2/7/25    Sofiya Martell is post-operative day three status post the above surgery. I called the patient and spoke with them over the phone today. Overall they are recovering well from surgery. Pain is well controlled with over the counter medication. I once again discussed the expected post-operative course with the patient. I answered all of their questions regarding the surgical procedure and post-operative course. I will see Sofiya Martell as scheduled on 2/20/25. Should they have any questions or concerns before their first post-operative appointment with me they were instructed to reach out to the office sooner.     Lm Gallegos MD   Hand, Wrist, & Elbow Surgery  [email or web address]

## 2025-02-12 ENCOUNTER — TELEPHONE (OUTPATIENT)
Dept: ORTHOPEDICS CLINIC | Facility: CLINIC | Age: 85
End: 2025-02-12

## 2025-02-12 NOTE — TELEPHONE ENCOUNTER
Spoke with patient who stated the steri strips are falling off and should she keep it covered.  We discussed that she should keep it covered, it is ok to get wet just make sure to pat dry.  Discussed the signs and symptoms of infection such as foul smelling drainage, increased redness/swelling/ warmth  around the incision and fever.  Patient verbalized understanding.      DOS 2/7/25  left endoscopic carpal tunnel release       Future Appointments   Date Time Provider Department Center   2/20/2025  1:00 PM Lm Gallegos MD [facility] [address]   3/13/2025  3:40 PM Kat Banerjee MD [facility] [address] [facility] [address]

## 2025-02-13 ENCOUNTER — TELEPHONE (OUTPATIENT)
Dept: NEUROLOGY | Age: 85
End: 2025-02-13

## 2025-02-19 ENCOUNTER — TELEPHONE (OUTPATIENT)
Dept: CARDIOLOGY | Age: 85
End: 2025-02-19

## 2025-02-20 ENCOUNTER — OFFICE VISIT (OUTPATIENT)
Facility: CLINIC | Age: 85
End: 2025-02-20
Payer: MEDICARE

## 2025-02-20 VITALS — HEIGHT: 61 IN | WEIGHT: 151 LBS | BODY MASS INDEX: 28.51 KG/M2

## 2025-02-20 DIAGNOSIS — G56.03 BILATERAL CARPAL TUNNEL SYNDROME: Primary | ICD-10-CM

## 2025-02-20 PROCEDURE — 99024 POSTOP FOLLOW-UP VISIT: CPT | Performed by: STUDENT IN AN ORGANIZED HEALTH CARE EDUCATION/TRAINING PROGRAM

## 2025-02-20 NOTE — PROGRESS NOTES
Clinic Note     Allergies[1]    Surgery:   12/6/24 - Right endoscopic carpal tunnel release  2/7/25 - Left endoscopic carpal tunnel release    HPI:  This patient is status post the above surgeries. Overall doing well. She is most recently status post left endoscopic carpal tunnel release. She states that similar to the post-operative course for her right side, she continues to have numbness in the left hand median nerve distribution however her symptoms of sharp pain, especially at nighttime, have significantly improved since surgery. She continues to feel like she has been able to use her hands more since surgery.     Review of Systems:  Negative unless stated in HPI.    Physical Exam:         Constitutional: NAD. AOx3. Well-developed and Well-nourished.   Psychiatric: Normal mood/ affect/ behavior. Judgment and thought content normal.     Right Upper Extremity:     Inspection    Surgical wound is well healed without signs of infection. No signs of wound dehiscence.    Palpation    Non-tender to palpation over surgical wound.      ROM    Able to flex, extend, pronosupinate the wrist gently with ease.    Able to make a near full fist and extend all fingers, at baseline per patient.      Neurovascular    Normal sensation in the median, ulnar, and radial nerve distribution.     Normally perfused hand(s).                    Constitutional: NAD. AOx3. Well-developed and Well-nourished.   Psychiatric: Normal mood/ affect/ behavior. Judgment and thought content normal.     Left Upper Extremity:     Inspection    Surgical wound is well healing without signs of infection. No signs of wound dehiscence.    Palpation    Non-tender to palpation over surgical wound.      ROM    Able to flex, extend, pronosupinate the wrist gently with ease.    Able to make a near full fist and extend all fingers, at baseline per patient.      Neurovascular    Normal sensation in the median, ulnar, and radial nerve distribution.     Normally  perfused hand(s).                 2-point sensation remains diminished in all digits, >10mm, consistent with pre-operative examination.    Assessment/Plan:  84 year old female status post right endoscopic carpal tunnel release on 12/6/24, and status post left endoscopic carpal tunnel release on 2/7/25.    I reviewed the patient's electronic medical record, including the pertinent office notes, medical/surgical history, medications and images. I specifically reviewed the images noted above, independently and discussed my independent interpretation of these images in combination with the pertinent positive and negative findings in my clinical exam with the patient    Status post the above surgeries.    Overall doing well. 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 It will likely take several months to >1 year to assess the full benefit of surgical release. Furthermore the patient has known peripheral neuropathy, and this may be a contributing factor to her persistent numbness as well. She understood. She is overall pleased with her results of both surgeries at this point especially given her improvement in pain that was waking her up at night. She continues to be interested in OT for strengthening of the digits and I think this is quite reasonable. I will update her prescription to include the left hand now as well.     As an update to our recent discussion about the patient's cardiomyopathy, she did eventually undergo skin punch biopsy from an outside neurologist, and the results of that test were not consistent with a diagnosis of amyloidosis. Sofiya is planning to follow up with neurology in the coming weeks.    Follow up with me in 4 weeks for repeat evaluation     Lm Gallegos MD   Hand, Wrist, & Elbow Surgery  [email or web address]         [1]    Allergies  Allergen Reactions    Adhesive Tape OTHER (SEE COMMENTS)     She developed \"bumps\" at the site       Ace Inhibitors Coughing     CLASS    Latex RASH

## 2025-02-26 ENCOUNTER — APPOINTMENT (OUTPATIENT)
Dept: CARDIOLOGY | Age: 85
End: 2025-02-26

## 2025-02-26 ENCOUNTER — EXTERNAL LAB (OUTPATIENT)
Dept: HEALTH INFORMATION MANAGEMENT | Facility: OTHER | Age: 85
End: 2025-02-26

## 2025-02-26 ENCOUNTER — TELEPHONE (OUTPATIENT)
Dept: CARDIOLOGY | Age: 85
End: 2025-02-26

## 2025-02-26 VITALS
OXYGEN SATURATION: 99 % | BODY MASS INDEX: 27.26 KG/M2 | DIASTOLIC BLOOD PRESSURE: 74 MMHG | SYSTOLIC BLOOD PRESSURE: 118 MMHG | WEIGHT: 149.03 LBS | HEART RATE: 69 BPM

## 2025-02-26 DIAGNOSIS — I50.32 CHRONIC HEART FAILURE WITH PRESERVED EJECTION FRACTION (HFPEF)  (CMD): Primary | ICD-10-CM

## 2025-02-26 DIAGNOSIS — I43 CARDIAC AMYLOIDOSIS  (CMD): ICD-10-CM

## 2025-02-26 DIAGNOSIS — E85.4 CARDIAC AMYLOIDOSIS  (CMD): ICD-10-CM

## 2025-02-26 DIAGNOSIS — I42.2 HYPERTROPHIC CARDIOMYOPATHY  (CMD): ICD-10-CM

## 2025-02-26 LAB — DIAGNOSTIC TESTING SUMMARY: POSITIVE

## 2025-02-26 PROCEDURE — 99215 OFFICE O/P EST HI 40 MIN: CPT | Performed by: INTERNAL MEDICINE

## 2025-02-26 SDOH — HEALTH STABILITY: PHYSICAL HEALTH: ON AVERAGE, HOW MANY MINUTES DO YOU ENGAGE IN EXERCISE AT THIS LEVEL?: 0 MIN

## 2025-02-26 SDOH — HEALTH STABILITY: PHYSICAL HEALTH: ON AVERAGE, HOW MANY DAYS PER WEEK DO YOU ENGAGE IN MODERATE TO STRENUOUS EXERCISE (LIKE A BRISK WALK)?: 0 DAYS

## 2025-02-26 ASSESSMENT — ENCOUNTER SYMPTOMS
SUSPICIOUS LESIONS: 0
FEVER: 0
COUGH: 0
ABDOMINAL PAIN: 0
HEMOPTYSIS: 0
HEMATOCHEZIA: 0
WEIGHT LOSS: 1
DIARRHEA: 0
ALLERGIC/IMMUNOLOGIC COMMENTS: NO NEW FOOD ALLERGIES
INSOMNIA: 0
SLEEP DISTURBANCES DUE TO BREATHING: 0
SHORTNESS OF BREATH: 0
BRUISES/BLEEDS EASILY: 0
BLOATING: 0
CONSTIPATION: 1
DEPRESSION: 0
CHILLS: 0
BACK PAIN: 1
DIZZINESS: 1
HEADACHES: 0
LIGHT-HEADEDNESS: 1

## 2025-02-26 ASSESSMENT — PATIENT HEALTH QUESTIONNAIRE - PHQ9
1. LITTLE INTEREST OR PLEASURE IN DOING THINGS: NOT AT ALL
SUM OF ALL RESPONSES TO PHQ9 QUESTIONS 1 AND 2: 0
CLINICAL INTERPRETATION OF PHQ2 SCORE: NO FURTHER SCREENING NEEDED
2. FEELING DOWN, DEPRESSED OR HOPELESS: NOT AT ALL
SUM OF ALL RESPONSES TO PHQ9 QUESTIONS 1 AND 2: 0

## 2025-03-04 ENCOUNTER — APPOINTMENT (OUTPATIENT)
Age: 85
End: 2025-03-04

## 2025-03-12 ENCOUNTER — APPOINTMENT (OUTPATIENT)
Dept: NUCLEAR MEDICINE | Age: 85
End: 2025-03-12
Attending: INTERNAL MEDICINE

## 2025-03-12 RX ORDER — LANCETS 33 GAUGE
1 EACH MISCELLANEOUS DAILY
Qty: 100 EACH | Refills: 1 | Status: SHIPPED | OUTPATIENT
Start: 2025-03-12

## 2025-03-12 RX ORDER — BLOOD SUGAR DIAGNOSTIC
1 STRIP MISCELLANEOUS DAILY
Qty: 100 STRIP | Refills: 1 | Status: SHIPPED | OUTPATIENT
Start: 2025-03-12

## 2025-03-12 NOTE — TELEPHONE ENCOUNTER
Please kindly review this medication    [x] FAILS PROTOCOL    [] HAS NO PROTOCOL ATTACHED     Please advise directions adjusted to daily from daily as needed.

## 2025-03-13 ENCOUNTER — OFFICE VISIT (OUTPATIENT)
Dept: INTERNAL MEDICINE CLINIC | Facility: CLINIC | Age: 85
End: 2025-03-13
Payer: MEDICARE

## 2025-03-13 VITALS
BODY MASS INDEX: 27.94 KG/M2 | SYSTOLIC BLOOD PRESSURE: 105 MMHG | OXYGEN SATURATION: 98 % | HEART RATE: 61 BPM | WEIGHT: 148 LBS | DIASTOLIC BLOOD PRESSURE: 66 MMHG | TEMPERATURE: 97 F | RESPIRATION RATE: 16 BRPM | HEIGHT: 61 IN

## 2025-03-13 DIAGNOSIS — E78.5 DYSLIPIDEMIA: ICD-10-CM

## 2025-03-13 DIAGNOSIS — I50.32 CHRONIC HEART FAILURE WITH PRESERVED EJECTION FRACTION (HFPEF) (HCC): ICD-10-CM

## 2025-03-13 DIAGNOSIS — E03.8 OTHER SPECIFIED HYPOTHYROIDISM: ICD-10-CM

## 2025-03-13 DIAGNOSIS — G56.03 BILATERAL CARPAL TUNNEL SYNDROME: ICD-10-CM

## 2025-03-13 DIAGNOSIS — I10 PRIMARY HYPERTENSION: ICD-10-CM

## 2025-03-13 DIAGNOSIS — E11.69 TYPE 2 DIABETES MELLITUS WITH OBESITY (HCC): ICD-10-CM

## 2025-03-13 DIAGNOSIS — E66.9 TYPE 2 DIABETES MELLITUS WITH OBESITY (HCC): ICD-10-CM

## 2025-03-13 DIAGNOSIS — I47.20 VENTRICULAR TACHYCARDIA (HCC): ICD-10-CM

## 2025-03-13 DIAGNOSIS — N18.31 STAGE 3A CHRONIC KIDNEY DISEASE (HCC): Chronic | ICD-10-CM

## 2025-03-13 DIAGNOSIS — I42.2 HYPERTROPHIC CARDIOMYOPATHY (HCC): ICD-10-CM

## 2025-03-13 DIAGNOSIS — F41.9 ANXIETY AND DEPRESSION: ICD-10-CM

## 2025-03-13 DIAGNOSIS — F32.A ANXIETY AND DEPRESSION: ICD-10-CM

## 2025-03-13 DIAGNOSIS — Z00.00 ENCOUNTER FOR MEDICARE ANNUAL WELLNESS EXAM: Primary | ICD-10-CM

## 2025-03-13 PROBLEM — J41.0 SMOKERS' COUGH (HCC): Chronic | Status: ACTIVE | Noted: 2025-03-13

## 2025-03-13 PROBLEM — J41.0 SMOKERS' COUGH (HCC): Chronic | Status: RESOLVED | Noted: 2025-03-13 | Resolved: 2025-03-13

## 2025-03-13 PROCEDURE — G0439 PPPS, SUBSEQ VISIT: HCPCS | Performed by: INTERNAL MEDICINE

## 2025-03-13 PROCEDURE — 99499 UNLISTED E&M SERVICE: CPT | Performed by: INTERNAL MEDICINE

## 2025-03-13 RX ORDER — DULOXETIN HYDROCHLORIDE 60 MG/1
CAPSULE, DELAYED RELEASE ORAL
Qty: 90 CAPSULE | Refills: 3 | Status: SHIPPED | OUTPATIENT
Start: 2025-03-13

## 2025-03-13 RX ORDER — ATORVASTATIN CALCIUM 40 MG/1
40 TABLET, FILM COATED ORAL DAILY
Qty: 90 TABLET | Refills: 3 | Status: SHIPPED | OUTPATIENT
Start: 2025-03-13

## 2025-03-13 NOTE — PROGRESS NOTES
Subjective:   Sofiya Martell is a 84 year old female who presents for a MA AHA (Medicare Advantage Annual Health Assessment) and Subsequent Annual Wellness visit (Pt already had Initial Annual Wellness) and scheduled follow up of multiple significant but stable problems.   1. Encounter for Medicare annual wellness exam (Primary)- she is up to date on dexa, declined mammogram (painful with ICD), screening colonoscopy not indicated at age 84.  She may consider shingrix from the pharmacy  2. Other specified hypothyroidism- clinically stable, check TSH  3. Hypertrophic cardiomyopathy (HCC)- stable, no cardiac complaints, she follows with Dr. Blankenship  4. Anxiety and depression- controlled on duloxetine  5. Primary hypertension- controlled on medication  6. Chronic heart failure with preserved ejection fraction (HFpEF) ([facility])- compensated, she follows with cardiology  7. Dyslipidemia- continue atorvastatin, check lipids prior to next visit  8. Bilateral carpal tunnel syndrome- s/p left Carpal tunnel surgery in Feb, slowly recovering.   9. Ventricular tachycardia (HCC)- stable, s/p ICD 2018  10. Type 2 diabetes mellitus with obesity (HCC)- BG well controlled on metformin and dapagliflozin, she goes to her optho q4 months. No foot complaints  11. Stage 3a chronic kidney disease (HCC)- stable, continue to monitor     History/Other:   Fall Risk Assessment:   She has been screened for Falls and is low risk.      Cognitive Assessment:   She had a completely normal cognitive assessment - see flowsheet entries     Functional Ability/Status:   Sofiya Martell has some abnormal functions as listed below:  She has Hearing problems based on screening of functional status.She has Vision problems based on screening of functional status.       Depression Screening (PHQ):  PHQ-2 SCORE: 2  , done 3/13/2025   Feeling down, depressed, or hopeless: 2    Last [place] Suicide Screening on 3/13/2025 was No Risk.         Advanced  Directives:   She does have a Living Will but we do NOT have it on file in Epic.    She does have a POA but we do NOT have it on file in Epic.    Not discussed      Patient Active Problem List   Diagnosis    HTN (hypertension)    Internal hemorrhoids without mention of complication    Hypothyroidism    Anxiety and depression    Unspecified hereditary and idiopathic peripheral neuropathy    Osteopenia    Hypertrophic cardiomyopathy (HCC)    Bilateral carpal tunnel syndrome    Type 2 diabetes mellitus with obesity (HCC)    Ventricular tachycardia (HCC)    Vitamin D deficiency    Dyslipidemia    Bilateral carotid artery stenosis    Depression, recurrent    COVID-19 virus infection    CKD (chronic kidney disease) stage 3, GFR 30-59 ml/min (HCC)    Chronic heart failure with preserved ejection fraction (HFpEF) ([facility])     Allergies:  She is allergic to adhesive tape, ace inhibitors, and latex.    Current Medications:  Outpatient Medications Marked as Taking for the 3/13/25 encounter (Office Visit) with Kat Banerjee MD   Medication Sig    atorvastatin 40 MG Oral Tab Take 1 tablet (40 mg total) by mouth daily.    DULoxetine 60 MG Oral Cap DR Particles TAKE 1 CAPSULE(60 MG) BY MOUTH EVERY DAY    Glucose Blood (ONETOUCH VERIO) In Vitro Strip 1 strip by In Vitro route daily.    OneTouch Delica Lancets 33G Does not apply Misc 1 Lancet by Finger stick route daily.    diphenhydrAMINE-APAP, sleep, (TYLENOL PM EXTRA STRENGTH OR) Take 1 tablet by mouth every evening.    gabapentin 300 MG Oral Cap Take 1 capsule (300 mg total) by mouth 3 (three) times daily.    metoprolol succinate ER 50 MG Oral Tablet 24 Hr Take 1 tablet (50 mg total) by mouth daily.    metFORMIN 500 MG Oral Tab Take 1 tablet (500 mg total) by mouth 2 (two) times daily with meals. (Patient taking differently: Take 1 tablet (500 mg total) by mouth daily with breakfast.)    levothyroxine 88 MCG Oral Tab Take 1 tablet (88 mcg total) by mouth before breakfast. Repeat  labs in 2 months    dapagliflozin 10 MG Oral Tab Take 5 mg by mouth daily.    spironolactone 25 MG Oral Tab Take 0.5 tablets (12.5 mg total) by mouth daily.    furosemide 20 MG Oral Tab TAKE 1/2 TABLET BY MOUTH DAILY AS NEEDED FOR SWELLING OR WEIGHT GAIN    latanoprost (XALATAN) 0.005 % Ophthalmic Solution Place 1 drop into both eyes nightly.    Aspirin 81 MG Oral Tab Take 1 tablet (81 mg total) by mouth daily.       Medical History:  She  has a past medical history of Anxiety (?), Arrhythmia, Atherosclerosis of coronary artery, Cardiomyopathy (HCC), Congestive heart disease (HCC), Depression, Diabetes (HCC), Disorder of thyroid, Diverticulosis of large intestine, Essential hypertension, Glaucoma, High blood pressure, High cholesterol, Hyperlipidemia, Lumbosacral stenosis, Obesity, Osteoarthritis, Pacemaker, Presence of cardiac defibrillator, Renal disorder, Thyroid disease, and Visual impairment.  Surgical History:  She  has a past surgical history that includes colonoscopy; cataract; pacemaker/defibrillator (2018); Cardiac pacemaker placement; colonoscopy (10 years ago); d & c (Two times);  (Twice. 3/61, ); other surgical history (Defibrillator); and carpal tunnel release (Bilateral).   Family History:  Her family history includes Heart Disease in her maternal grandfather and son; Heart Disorder in her father and son; subclavian stenosis in her son. She was adopted.  Social History:  She  reports that she quit smoking about 40 years ago. Her smoking use included cigarettes. She has a 1 pack-year smoking history. She has been exposed to tobacco smoke. She has never used smokeless tobacco. She reports that she does not drink alcohol and does not use drugs.    Tobacco:  She smoked tobacco in the past but quit greater than 12 months ago.  Social History     Tobacco Use   Smoking Status Former    Current packs/day: 0.00    Average packs/day: 1 pack/day for 1 year (1.0 ttl pk-yrs)    Types: Cigarettes     Quit date: 1985    Years since quittin.2    Passive exposure: Past   Smokeless Tobacco Never   Tobacco Comments    Smoked off and on for20 years Has been 35 years quit.        CAGE Alcohol Screen:   CAGE screening score of 0 on 3/13/2025, showing low risk of alcohol abuse.      Patient Care Team:  Kat Banerjee MD as PCP - General (Internal Medicine)  Rosita Morris (SURGERY, ORTHOPEDIC)  Darrell Ashley PA (Physician Assistant)  Dakotah Venegas MD as Consulting Physician (NEUROSURGERY)  Mercedes Blankenship MD (Cardiology, Advanced Heart Failure & Transplant )  Neal Page MD (Cardiac Electrophysiology)    Review of Systems     Negative for f/c/CP/SOB    Objective:   Physical Exam    General Appearance:  Alert, cooperative, no distress, appears stated age   Head:  Normocephalic, without obvious abnormality, atraumatic   Eyes:  PERRL, conjunctiva/corneas clear, EOM's intact both eyes   Ears:  Normal TM's and external ear canals, both ears   Nose: Nares normal, septum midline,mucosa normal, no drainage or sinus tenderness   Throat: Lips, mucosa, and tongue normal   Neck: Supple, symmetrical, trachea midline, no adenopathy   Back:   Symmetric, no curvature, ROM normal, no CVA tenderness   Lungs:   Clear to auscultation bilaterally, respirations unlabored   Heart:  +ICD reg, S1 and S2 normal   Abdomen:   Soft, non-tender, ND, nl BS   Pelvic: Deferred   Extremities: Extremities normal, atraumatic, no cyanosis or edema   Pulses: 2+ and symmetric       Lymph nodes: Cervical, supraclavicular, and axillary nodes normal   Neurologic: Alert and oriented   Bilateral barefoot skin diabetic exam is normal, visualized feet and the appearance is abnormal, numerous varicose veins  Bilateral monofilament/sensation of both feet is normal.  Pulsation pedal pulse exam of both lower legs/feet is normal        /66   Pulse 61   Temp 97.3 °F (36.3 °C) (Temporal)   Resp 16   Ht 5' 1\" (1.549 m)   Wt 148 lb (67.1 kg)   LMP   (LMP Unknown)   SpO2 98%   Breastfeeding No   BMI 27.96 kg/m²  Estimated body mass index is 27.96 kg/m² as calculated from the following:    Height as of this encounter: 5' 1\" (1.549 m).    Weight as of this encounter: 148 lb (67.1 kg).    Medicare Hearing Assessment:   Hearing Screening    Time taken: 3/13/2025  3:43 PM  Entry User: Alicja Saavedra CMA  Screening Method: Finger Rub  Finger Rub Result: Pass         Visual Acuity:   Right Eye Visual Acuity: Uncorrected Right Eye Chart Acuity: 20/40   Left Eye Visual Acuity: Uncorrected Left Eye Chart Acuity: 20/30   Both Eyes Visual Acuity: Uncorrected Both Eyes Chart Acuity: 20/40   Able To Tolerate Visual Acuity: Yes        Assessment & Plan:   Sofiya Martell is a 84 year old female who presents for a Medicare Assessment.     1. Encounter for Medicare annual wellness exam (Primary)- she is up to date on dexa, declined mammogram (painful with ICD), screening colonoscopy not indicated at age 84.  She may consider shingrix from the pharmacy  2. Other specified hypothyroidism- clinically stable, check TSH  3. Hypertrophic cardiomyopathy (HCC)- stable, she follows with Dr. Blankenship  4. Anxiety and depression- controlled on duloxetine  5. Primary hypertension- controlled on medication  6. Chronic heart failure with preserved ejection fraction (HFpEF) (HCC)- compensated, she follows with cardiology  7. Dyslipidemia- continue atorvastatin, check lipids prior to next visit  8. Bilateral carpal tunnel syndrome- s/p left Carpal tunnel surgery in Feb, slowly recovering.   9. Ventricular tachycardia (HCC)- stable, s/p ICD 2018  10. Type 2 diabetes mellitus with obesity (HCC)- BG well controlled on metformin and dapagliflozin, she goes to her optho q4 months. Foot exam done today  -     Hemoglobin A1C; Future; Expected date: 03/13/2025  11. Stage 3a chronic kidney disease (HCC)- stable, continue to monitor      The patient indicates understanding of these issues and  agrees to the plan.  Continue with current treatment plan.  Reinforced healthy diet, lifestyle, and exercise.      Return in about 3 months (around 6/13/2025), or if symptoms worsen or fail to improve, for follow up after labs.     Kat Banerjee MD, 3/13/2025     Supplementary Documentation:   General Health:  In the past six months, have you lost more than 10 pounds without trying?: 2 - No  Has your appetite been poor?: No  Type of Diet: Diabetic  How does the patient maintain a good energy level?: Appropriate Exercise, Daily Walks  How would you describe your daily physical activity?: Light  How would you describe your current health state?: Fair  How do you maintain positive mental well-being?: Visiting Friends  On a scale of 0 to 10, with 0 being no pain and 10 being severe pain, what is your pain level?: 3 - (Mild)  In the past six months, have you experienced urine leakage?: 0-No  At any time do you feel concerned for the safety/well-being of yourself and/or your children, in your home or elsewhere?: No  Have you had any immunizations at another office such as Influenza, Hepatitis B, Tetanus, or Pneumococcal?: No    Health Maintenance   Topic Date Due    Zoster Vaccines (1 of 2) Never done    Diabetes Care Dilated Eye Exam  12/20/2024    Annual Well Visit  01/01/2025    Annual Depression Screening  01/01/2025    Diabetes Care: Foot Exam (Annual)  01/01/2025    Diabetes Care: Microalb/Creat Ratio (Annual)  01/01/2025    Diabetes Care A1C  07/30/2025    Diabetes Care: GFR  02/04/2026    Influenza Vaccine  Completed    DEXA Scan  Completed    Fall Risk Screening (Annual)  Completed    Pneumococcal Vaccine: 50+ Years  Completed    Meningococcal B Vaccine  Aged Out

## 2025-03-24 ENCOUNTER — TELEPHONE (OUTPATIENT)
Dept: ORTHOPEDICS CLINIC | Facility: CLINIC | Age: 85
End: 2025-03-24

## 2025-03-24 NOTE — TELEPHONE ENCOUNTER
Patient called to apologize she missed her appt and would like to know if Dr. Gallegos wants her to rs. Please advise if/when

## 2025-03-25 ENCOUNTER — HOSPITAL ENCOUNTER (OUTPATIENT)
Dept: NUCLEAR MEDICINE | Age: 85
Discharge: HOME | End: 2025-03-25
Attending: INTERNAL MEDICINE

## 2025-03-25 DIAGNOSIS — I50.32 CHRONIC HEART FAILURE WITH PRESERVED EJECTION FRACTION (HFPEF)  (CMD): ICD-10-CM

## 2025-03-25 DIAGNOSIS — I43 CARDIAC AMYLOIDOSIS  (CMD): ICD-10-CM

## 2025-03-25 DIAGNOSIS — E85.4 CARDIAC AMYLOIDOSIS  (CMD): ICD-10-CM

## 2025-03-25 DIAGNOSIS — I42.2 HYPERTROPHIC CARDIOMYOPATHY  (CMD): ICD-10-CM

## 2025-03-25 PROCEDURE — A9538 TC99M PYROPHOSPHATE: HCPCS | Performed by: INTERNAL MEDICINE

## 2025-03-25 PROCEDURE — 10006150 HB RX 343: Performed by: INTERNAL MEDICINE

## 2025-03-25 PROCEDURE — 78803 RP LOCLZJ TUM SPECT 1 AREA: CPT

## 2025-03-25 PROCEDURE — 78803 RP LOCLZJ TUM SPECT 1 AREA: CPT | Performed by: INTERNAL MEDICINE

## 2025-03-25 RX ORDER — TECHNETIUM TC99M PYROPHOSPHATE 12 MG/10ML
20 INJECTION INTRAVENOUS ONCE
Status: COMPLETED | OUTPATIENT
Start: 2025-03-25 | End: 2025-03-25

## 2025-03-25 RX ADMIN — TECHNETIUM TC99M PYROPHOSPHATE 21.4 MILLICURIE: 12 INJECTION INTRAVENOUS at 11:51

## 2025-03-25 NOTE — TELEPHONE ENCOUNTER
Patient scheduled for   Future Appointments   Date Time Provider Department Center   4/1/2025  1:30 PM Lm Gallegos MD [facility]   6/26/2025 10:20 AM Kat Banerjee MD [facility]

## 2025-03-27 ENCOUNTER — TELEPHONE (OUTPATIENT)
Dept: CARDIOLOGY | Age: 85
End: 2025-03-27

## 2025-04-01 ENCOUNTER — OFFICE VISIT (OUTPATIENT)
Dept: ORTHOPEDICS CLINIC | Facility: CLINIC | Age: 85
End: 2025-04-01
Payer: MEDICARE

## 2025-04-01 VITALS — BODY MASS INDEX: 27.94 KG/M2 | HEIGHT: 61 IN | WEIGHT: 148 LBS

## 2025-04-01 DIAGNOSIS — G56.03 BILATERAL CARPAL TUNNEL SYNDROME: Primary | ICD-10-CM

## 2025-04-01 PROCEDURE — 99024 POSTOP FOLLOW-UP VISIT: CPT | Performed by: STUDENT IN AN ORGANIZED HEALTH CARE EDUCATION/TRAINING PROGRAM

## 2025-04-01 NOTE — PROGRESS NOTES
Clinic Note     Allergies[1]    Surgery:   12/6/24 - Right endoscopic carpal tunnel release  2/7/25 - Left endoscopic carpal tunnel release    HPI:  This patient is status post the above surgeries. Overall doing well. She continues to have numbness in the bilateral hand median nerve distribution however her symptoms of sharp pain, especially at nighttime, have significantly improved since surgery. She continues to feel like she has been able to use her hands more since surgery and is back to all of her regular daily activities with ease. She has completed OT.     Review of Systems:  Negative unless stated in HPI.    Physical Exam:         Constitutional: NAD. AOx3. Well-developed and Well-nourished.   Psychiatric: Normal mood/ affect/ behavior. Judgment and thought content normal.     Right Upper Extremity:     Inspection    Surgical wound is well healed without signs of infection. No signs of wound dehiscence.    Palpation    Non-tender to palpation over surgical wound.      ROM    Able to flex, extend, pronosupinate the wrist with ease.    Able to make a near full fist and extend all fingers, at baseline per patient.      Neurovascular    Normal sensation in the median, ulnar, and radial nerve distribution.     Normally perfused hand(s).                  Left Upper Extremity:     Inspection    Surgical wound is well healed without signs of infection. No signs of wound dehiscence.    Palpation    Non-tender to palpation over surgical wound.      ROM    Able to flex, extend, pronosupinate the wrist with ease.    Able to make a near full fist and extend all fingers, at baseline per patient.      Neurovascular    Normal sensation in the median, ulnar, and radial nerve distribution.     Normally perfused hand(s).                 2-point sensation remains diminished in all digits, >10mm, consistent with pre-operative examination.    Assessment/Plan:  84 year old female status post right endoscopic carpal tunnel  release on 12/6/24, and status post left endoscopic carpal tunnel release on 2/7/25.    I reviewed the patient's electronic medical record, including the pertinent office notes, medical/surgical history, medications and images. I specifically reviewed the images noted above, independently and discussed my independent interpretation of these images in combination with the pertinent positive and negative findings in my clinical exam with the patient    Status post the above surgeries.    Overall doing well. 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 It will likely take several months to >1 year to assess the full benefit of surgical release. Furthermore the patient has known peripheral neuropathy, and this may be a contributing factor to her persistent numbness as well. She understood. She continues to remain very pleased with the results of both surgeries at this point especially given her improvement in pain that was waking her up at night. She will see me as needed moving forward. All questions answered.    Lm Gallegos MD   Hand, Wrist, & Elbow Surgery  [email or web address]         [1]   Allergies  Allergen Reactions    Adhesive Tape OTHER (SEE COMMENTS)     She developed \"bumps\" at the site       Ace Inhibitors Coughing     CLASS    Latex RASH

## 2025-04-11 RX ORDER — FUROSEMIDE 20 MG/1
20 TABLET ORAL PRN
Qty: 45 TABLET | Refills: 1 | Status: SHIPPED | OUTPATIENT
Start: 2025-04-11

## 2025-05-07 RX ORDER — BLOOD SUGAR DIAGNOSTIC
1 STRIP MISCELLANEOUS DAILY
Qty: 100 STRIP | Refills: 1 | OUTPATIENT
Start: 2025-05-07

## 2025-05-07 NOTE — TELEPHONE ENCOUNTER
Outpatient Medication Detail     Disp Refills Start End    Glucose Blood (ONETOUCH VERIO) In Vitro Strip 100 strip 1 3/12/2025 --    Sig - Route: 1 strip by In Vitro route daily. - In Vitro    Sent to pharmacy as: OneTouch Verio In Vitro Strip    Notes to Pharmacy: DX Code: E11.69, E66.9 (Type 2 diabetes mellitus with obesity) patient is non-insulin dependent    E-Prescribing Status: Receipt confirmed by pharmacy (3/12/2025  9:42 AM CDT)      Pharmacy    [id number] - [address] AT [facility] & [address] [place] [address], [phone], [phone]

## 2025-05-08 ENCOUNTER — LAB SERVICES (OUTPATIENT)
Dept: LAB | Age: 85
End: 2025-05-08

## 2025-05-08 DIAGNOSIS — I50.32 CHRONIC HEART FAILURE WITH PRESERVED EJECTION FRACTION (HFPEF)  (CMD): ICD-10-CM

## 2025-05-08 DIAGNOSIS — I42.2 HYPERTROPHIC CARDIOMYOPATHY  (CMD): ICD-10-CM

## 2025-05-08 PROCEDURE — 80048 BASIC METABOLIC PNL TOTAL CA: CPT | Performed by: CLINICAL MEDICAL LABORATORY

## 2025-05-08 PROCEDURE — 83880 ASSAY OF NATRIURETIC PEPTIDE: CPT | Performed by: CLINICAL MEDICAL LABORATORY

## 2025-05-08 PROCEDURE — 36415 COLL VENOUS BLD VENIPUNCTURE: CPT | Performed by: INTERNAL MEDICINE

## 2025-05-08 PROCEDURE — 85025 COMPLETE CBC W/AUTO DIFF WBC: CPT | Performed by: CLINICAL MEDICAL LABORATORY

## 2025-05-09 ENCOUNTER — RESULTS FOLLOW-UP (OUTPATIENT)
Dept: CARDIOLOGY | Age: 85
End: 2025-05-09

## 2025-05-09 LAB
ANION GAP SERPL CALC-SCNC: 11 MMOL/L (ref 7–19)
BASOPHILS # BLD: 0.1 K/MCL (ref 0–0.3)
BASOPHILS NFR BLD: 1 %
BUN SERPL-MCNC: 21 MG/DL (ref 6–20)
BUN/CREAT SERPL: 22 (ref 7–25)
CALCIUM SERPL-MCNC: 9.6 MG/DL (ref 8.4–10.2)
CHLORIDE SERPL-SCNC: 103 MMOL/L (ref 97–110)
CO2 SERPL-SCNC: 28 MMOL/L (ref 21–32)
CREAT SERPL-MCNC: 0.94 MG/DL (ref 0.51–0.95)
DEPRECATED RDW RBC: 46.2 FL (ref 39–50)
EGFRCR SERPLBLD CKD-EPI 2021: 61 ML/MIN/{1.73_M2}
EOSINOPHIL # BLD: 0.3 K/MCL (ref 0–0.5)
EOSINOPHIL NFR BLD: 3 %
ERYTHROCYTE [DISTWIDTH] IN BLOOD: 13.2 % (ref 11–15)
FASTING DURATION TIME PATIENT: 0 HOURS (ref 0–999)
GLUCOSE SERPL-MCNC: 101 MG/DL (ref 70–99)
HCT VFR BLD CALC: 45.5 % (ref 36–46.5)
HGB BLD-MCNC: 15.4 G/DL (ref 12–15.5)
IMM GRANULOCYTES # BLD AUTO: 0 K/MCL (ref 0–0.2)
IMM GRANULOCYTES # BLD: 0 %
LYMPHOCYTES # BLD: 3.1 K/MCL (ref 1–4)
LYMPHOCYTES NFR BLD: 36 %
MCH RBC QN AUTO: 32.3 PG (ref 26–34)
MCHC RBC AUTO-ENTMCNC: 33.8 G/DL (ref 32–36.5)
MCV RBC AUTO: 95.4 FL (ref 78–100)
MONOCYTES # BLD: 0.5 K/MCL (ref 0.3–0.9)
MONOCYTES NFR BLD: 6 %
NEUTROPHILS # BLD: 4.5 K/MCL (ref 1.8–7.7)
NEUTROPHILS NFR BLD: 54 %
NRBC BLD MANUAL-RTO: 0 /100 WBC
NT-PROBNP SERPL-MCNC: 506 PG/ML
PLATELET # BLD AUTO: 284 K/MCL (ref 140–450)
POTASSIUM SERPL-SCNC: 5.1 MMOL/L (ref 3.4–5.1)
RBC # BLD: 4.77 MIL/MCL (ref 4–5.2)
SODIUM SERPL-SCNC: 137 MMOL/L (ref 135–145)
WBC # BLD: 8.5 K/MCL (ref 4.2–11)

## 2025-05-14 ENCOUNTER — APPOINTMENT (OUTPATIENT)
Dept: CARDIOLOGY | Age: 85
End: 2025-05-14

## 2025-05-14 VITALS
HEART RATE: 65 BPM | DIASTOLIC BLOOD PRESSURE: 64 MMHG | BODY MASS INDEX: 27.26 KG/M2 | WEIGHT: 149.03 LBS | OXYGEN SATURATION: 98 % | SYSTOLIC BLOOD PRESSURE: 105 MMHG

## 2025-05-14 DIAGNOSIS — G62.9 NEUROPATHY: ICD-10-CM

## 2025-05-14 DIAGNOSIS — E78.5 DYSLIPIDEMIA: ICD-10-CM

## 2025-05-14 DIAGNOSIS — E55.9 VITAMIN D DEFICIENCY: ICD-10-CM

## 2025-05-14 DIAGNOSIS — I50.32 CHRONIC HEART FAILURE WITH PRESERVED EJECTION FRACTION (HFPEF)  (CMD): Primary | ICD-10-CM

## 2025-05-14 DIAGNOSIS — I65.23 BILATERAL CAROTID ARTERY STENOSIS: ICD-10-CM

## 2025-05-14 PROCEDURE — 99215 OFFICE O/P EST HI 40 MIN: CPT | Performed by: INTERNAL MEDICINE

## 2025-05-14 SDOH — HEALTH STABILITY: PHYSICAL HEALTH: ON AVERAGE, HOW MANY DAYS PER WEEK DO YOU ENGAGE IN MODERATE TO STRENUOUS EXERCISE (LIKE A BRISK WALK)?: 0 DAYS

## 2025-05-14 SDOH — HEALTH STABILITY: PHYSICAL HEALTH: ON AVERAGE, HOW MANY MINUTES DO YOU ENGAGE IN EXERCISE AT THIS LEVEL?: 0 MIN

## 2025-05-14 ASSESSMENT — PATIENT HEALTH QUESTIONNAIRE - PHQ9
2. FEELING DOWN, DEPRESSED OR HOPELESS: NOT AT ALL
SUM OF ALL RESPONSES TO PHQ9 QUESTIONS 1 AND 2: 0
1. LITTLE INTEREST OR PLEASURE IN DOING THINGS: NOT AT ALL
SUM OF ALL RESPONSES TO PHQ9 QUESTIONS 1 AND 2: 0
CLINICAL INTERPRETATION OF PHQ2 SCORE: NO FURTHER SCREENING NEEDED

## 2025-05-14 ASSESSMENT — ENCOUNTER SYMPTOMS
SUSPICIOUS LESIONS: 0
COUGH: 0
LIGHT-HEADEDNESS: 1
CHILLS: 0
ALLERGIC/IMMUNOLOGIC COMMENTS: NO NEW FOOD ALLERGIES
DIZZINESS: 1
HEADACHES: 0
SLEEP DISTURBANCES DUE TO BREATHING: 0
FEVER: 0
ABDOMINAL PAIN: 0
DEPRESSION: 0
BRUISES/BLEEDS EASILY: 0
HEMATOCHEZIA: 0
SHORTNESS OF BREATH: 0
CONSTIPATION: 1
BACK PAIN: 1
WEIGHT LOSS: 1
HEMOPTYSIS: 0
INSOMNIA: 0
BLOATING: 0
DIARRHEA: 0

## 2025-05-20 ENCOUNTER — ANCILLARY PROCEDURE (OUTPATIENT)
Dept: CARDIOLOGY | Age: 85
End: 2025-05-20
Attending: INTERNAL MEDICINE

## 2025-05-20 DIAGNOSIS — Z95.810 ICD (IMPLANTABLE CARDIOVERTER-DEFIBRILLATOR) IN PLACE: ICD-10-CM

## 2025-05-29 ENCOUNTER — TELEPHONE (OUTPATIENT)
Dept: CARDIOLOGY | Age: 85
End: 2025-05-29

## 2025-05-29 RX ORDER — DAPAGLIFLOZIN 5 MG/1
5 TABLET, FILM COATED ORAL DAILY
Qty: 90 TABLET | Refills: 11 | Status: SHIPPED | OUTPATIENT
Start: 2025-05-29 | End: 2025-05-30 | Stop reason: SDUPTHER

## 2025-05-30 RX ORDER — DAPAGLIFLOZIN 5 MG/1
5 TABLET, FILM COATED ORAL DAILY
Qty: 90 TABLET | Refills: 11 | Status: SHIPPED | OUTPATIENT
Start: 2025-05-30

## 2025-06-11 ENCOUNTER — LAB ENCOUNTER (OUTPATIENT)
Dept: LAB | Age: 85
End: 2025-06-11
Attending: INTERNAL MEDICINE
Payer: MEDICARE

## 2025-06-11 DIAGNOSIS — E07.9 THYROID DYSFUNCTION: ICD-10-CM

## 2025-06-11 DIAGNOSIS — Z00.00 ENCOUNTER FOR WELLNESS EXAMINATION: ICD-10-CM

## 2025-06-11 DIAGNOSIS — E11.69 TYPE 2 DIABETES MELLITUS WITH OBESITY (HCC): ICD-10-CM

## 2025-06-11 DIAGNOSIS — E03.8 OTHER SPECIFIED HYPOTHYROIDISM: ICD-10-CM

## 2025-06-11 DIAGNOSIS — N18.31 STAGE 3A CHRONIC KIDNEY DISEASE (HCC): ICD-10-CM

## 2025-06-11 DIAGNOSIS — I10 PRIMARY HYPERTENSION: ICD-10-CM

## 2025-06-11 DIAGNOSIS — E78.00 HYPERCHOLESTEREMIA: ICD-10-CM

## 2025-06-11 DIAGNOSIS — E66.9 TYPE 2 DIABETES MELLITUS WITH OBESITY (HCC): ICD-10-CM

## 2025-06-11 DIAGNOSIS — E78.5 DYSLIPIDEMIA: ICD-10-CM

## 2025-06-11 LAB
ALBUMIN SERPL-MCNC: 4.3 G/DL (ref 3.2–4.8)
ALBUMIN/GLOB SERPL: 1.5 {RATIO} (ref 1–2)
ALP LIVER SERPL-CCNC: 92 U/L (ref 55–142)
ALT SERPL-CCNC: 15 U/L (ref 10–49)
ANION GAP SERPL CALC-SCNC: 8 MMOL/L (ref 0–18)
AST SERPL-CCNC: 24 U/L (ref ?–34)
BASOPHILS # BLD AUTO: 0.08 X10(3) UL (ref 0–0.2)
BASOPHILS NFR BLD AUTO: 1 %
BILIRUB SERPL-MCNC: 0.6 MG/DL (ref 0.2–1.1)
BUN BLD-MCNC: 17 MG/DL (ref 9–23)
CALCIUM BLD-MCNC: 10 MG/DL (ref 8.7–10.6)
CHLORIDE SERPL-SCNC: 102 MMOL/L (ref 98–112)
CHOLEST SERPL-MCNC: 138 MG/DL (ref ?–200)
CO2 SERPL-SCNC: 28 MMOL/L (ref 21–32)
CREAT BLD-MCNC: 0.98 MG/DL (ref 0.55–1.02)
CREAT UR-SCNC: 12.2 MG/DL
EGFRCR SERPLBLD CKD-EPI 2021: 58 ML/MIN/1.73M2 (ref 60–?)
EOSINOPHIL # BLD AUTO: 0.25 X10(3) UL (ref 0–0.7)
EOSINOPHIL NFR BLD AUTO: 3.3 %
ERYTHROCYTE [DISTWIDTH] IN BLOOD BY AUTOMATED COUNT: 13.2 %
EST. AVERAGE GLUCOSE BLD GHB EST-MCNC: 120 MG/DL (ref 68–126)
FASTING PATIENT LIPID ANSWER: YES
FASTING STATUS PATIENT QL REPORTED: YES
GLOBULIN PLAS-MCNC: 2.9 G/DL (ref 2–3.5)
GLUCOSE BLD-MCNC: 109 MG/DL (ref 70–99)
HBA1C MFR BLD: 5.8 % (ref ?–5.7)
HCT VFR BLD AUTO: 45 % (ref 35–48)
HDLC SERPL-MCNC: 60 MG/DL (ref 40–59)
HGB BLD-MCNC: 15 G/DL (ref 12–16)
IMM GRANULOCYTES # BLD AUTO: 0.01 X10(3) UL (ref 0–1)
IMM GRANULOCYTES NFR BLD: 0.1 %
LDLC SERPL CALC-MCNC: 66 MG/DL (ref ?–100)
LYMPHOCYTES # BLD AUTO: 3.06 X10(3) UL (ref 1–4)
LYMPHOCYTES NFR BLD AUTO: 39.8 %
MCH RBC QN AUTO: 32.4 PG (ref 26–34)
MCHC RBC AUTO-ENTMCNC: 33.3 G/DL (ref 31–37)
MCV RBC AUTO: 97.2 FL (ref 80–100)
MICROALBUMIN UR-MCNC: <0.3 MG/DL
MONOCYTES # BLD AUTO: 0.54 X10(3) UL (ref 0.1–1)
MONOCYTES NFR BLD AUTO: 7 %
NEUTROPHILS # BLD AUTO: 3.75 X10 (3) UL (ref 1.5–7.7)
NEUTROPHILS # BLD AUTO: 3.75 X10(3) UL (ref 1.5–7.7)
NEUTROPHILS NFR BLD AUTO: 48.8 %
NONHDLC SERPL-MCNC: 78 MG/DL (ref ?–130)
OSMOLALITY SERPL CALC.SUM OF ELEC: 288 MOSM/KG (ref 275–295)
PLATELET # BLD AUTO: 257 10(3)UL (ref 150–450)
POTASSIUM SERPL-SCNC: 4.9 MMOL/L (ref 3.5–5.1)
PROT SERPL-MCNC: 7.2 G/DL (ref 5.7–8.2)
RBC # BLD AUTO: 4.63 X10(6)UL (ref 3.8–5.3)
SODIUM SERPL-SCNC: 138 MMOL/L (ref 136–145)
T3FREE SERPL-MCNC: 3 PG/ML (ref 2.4–4.2)
T4 FREE SERPL-MCNC: 1.6 NG/DL (ref 0.8–1.7)
TRIGL SERPL-MCNC: 56 MG/DL (ref 30–149)
TSI SER-ACNC: 0.42 UIU/ML (ref 0.55–4.78)
VLDLC SERPL CALC-MCNC: 8 MG/DL (ref 0–30)
WBC # BLD AUTO: 7.7 X10(3) UL (ref 4–11)

## 2025-06-11 PROCEDURE — 84439 ASSAY OF FREE THYROXINE: CPT

## 2025-06-11 PROCEDURE — 80061 LIPID PANEL: CPT

## 2025-06-11 PROCEDURE — 82570 ASSAY OF URINE CREATININE: CPT

## 2025-06-11 PROCEDURE — 85025 COMPLETE CBC W/AUTO DIFF WBC: CPT

## 2025-06-11 PROCEDURE — 84443 ASSAY THYROID STIM HORMONE: CPT

## 2025-06-11 PROCEDURE — 80053 COMPREHEN METABOLIC PANEL: CPT

## 2025-06-11 PROCEDURE — 82043 UR ALBUMIN QUANTITATIVE: CPT

## 2025-06-11 PROCEDURE — 84481 FREE ASSAY (FT-3): CPT

## 2025-06-11 PROCEDURE — 83036 HEMOGLOBIN GLYCOSYLATED A1C: CPT

## 2025-06-11 PROCEDURE — 36415 COLL VENOUS BLD VENIPUNCTURE: CPT

## 2025-06-26 ENCOUNTER — OFFICE VISIT (OUTPATIENT)
Dept: INTERNAL MEDICINE CLINIC | Facility: CLINIC | Age: 85
End: 2025-06-26
Payer: MEDICARE

## 2025-06-26 VITALS
BODY MASS INDEX: 29 KG/M2 | SYSTOLIC BLOOD PRESSURE: 108 MMHG | WEIGHT: 151.63 LBS | HEART RATE: 58 BPM | DIASTOLIC BLOOD PRESSURE: 60 MMHG | TEMPERATURE: 97 F | OXYGEN SATURATION: 98 %

## 2025-06-26 DIAGNOSIS — N18.31 STAGE 3A CHRONIC KIDNEY DISEASE (HCC): Chronic | ICD-10-CM

## 2025-06-26 DIAGNOSIS — E78.5 DYSLIPIDEMIA: ICD-10-CM

## 2025-06-26 DIAGNOSIS — E03.8 OTHER SPECIFIED HYPOTHYROIDISM: ICD-10-CM

## 2025-06-26 DIAGNOSIS — E11.69 TYPE 2 DIABETES MELLITUS WITH OBESITY (HCC): Primary | ICD-10-CM

## 2025-06-26 DIAGNOSIS — E66.9 TYPE 2 DIABETES MELLITUS WITH OBESITY (HCC): Primary | ICD-10-CM

## 2025-06-26 DIAGNOSIS — M48.02 CERVICAL STENOSIS OF SPINE: ICD-10-CM

## 2025-06-26 PROCEDURE — G2211 COMPLEX E/M VISIT ADD ON: HCPCS | Performed by: INTERNAL MEDICINE

## 2025-06-26 PROCEDURE — 99214 OFFICE O/P EST MOD 30 MIN: CPT | Performed by: INTERNAL MEDICINE

## 2025-06-26 NOTE — PROGRESS NOTES
The following individual(s) verbally consented to be recorded using ambient AI listening technology and understand that they can each withdraw their consent to this listening technology at any point by asking the clinician to turn off or pause the recording:    Patient name: Sofiya Martell  Subjective:   Sofiya Martell is a 84 year old female who presents for Follow - Up (3 month DM f/u /Lab results /)     History/Other:   History of Present Illness  Sofiya Martell is an 84 year old pleasant patient with diabetes, chronic kidney disease, and cardiomyopathy who presents for routine follow-up.    Diabetes is well controlled with an A1c of 5.8 and a blood sugar level of 109. She is compliant with metformin and dapagliflozin. She is taking atorvastatin 40 mg for cholesterol management, with an LDL of 66.    She has mild chronic kidney disease with a GFR of 58. Thyroid function tests show borderline TSH of 0.4 with normal free t4 and t3. Weight is stable      She is on duloxetine for depression which seems to be helping. She is interested in seeing Dr. Kim again for chronic neck and arm pain along with hand weakness and numbness. She has cervical spine stenosis, not sure if steroid injection would help her symptoms.      Chief Complaint Reviewed and Verified  Nursing Notes Reviewed and   Verified  Tobacco Reviewed  Allergies Reviewed  Medications Reviewed    Problem List Reviewed  Medical History Reviewed  Surgical History   Reviewed  Family History Reviewed         Tobacco:  She smoked tobacco in the past but quit greater than 12 months ago.  Tobacco Use[1]     Current Medications[2]      Review of Systems:  Review of Systems  Neck and arm pains, hands with numbness/tingling and weakness  No f/c/CP/cough    Objective:   /60 (BP Location: Right arm, Patient Position: Sitting, Cuff Size: adult)   Pulse 58   Temp 97.1 °F (36.2 °C) (Temporal)   Wt 151 lb 9.6 oz (68.8 kg)   LMP   (LMP Unknown)   SpO2 98%   BMI 28.64 kg/m²  Estimated body mass index is 28.64 kg/m² as calculated from the following:    Height as of 4/1/25: 5' 1\" (1.549 m).    Weight as of this encounter: 151 lb 9.6 oz (68.8 kg).  Physical Exam  Gen: NAD, appears stated age  HEENT: PERRL, EOMI, OP-clear, TMs- WNL  NECK: supple, no thyromegaly   CV: Regular, nl S1 S2  RESP: Clear to auscultation bilaterally  ABD: soft, NT, ND, +BS  EXT: no clubbing, cyanosis, or edema  NEURO: Alert and oriented    Results  LABS  A1c: 5.8% (06/11/2025)  Hemoglobin: 15 g/dL (06/11/2025)  Glucose: 109 mg/dL (06/11/2025)  GFR: 58 mL/min/1.73m² (06/11/2025)  LDL: 66 mg/dL (06/11/2025)  TSH: 0.4 mIU/L (06/11/2025)      Assessment & Plan:   1. Type 2 diabetes mellitus with obesity (HCC) (Primary)  Diabetes is well-controlled with an A1c of 5.8 and blood glucose of 109 mg/dL on recent labs  - Continue current diabetes management regimen, repeat labs in October  - Encourage healthy diet and physical activity  -     Hemoglobin A1C; Future; Expected date: 10/01/2025  -     Basic Metabolic Panel (8); Future; Expected date: 10/01/2025  2. Cervical stenosis of spine - patient with chronic neck pain and symptoms of numbness/weakness in arms and hands. Referred to Dr. Kim for opinion  -     Pain Management Referral - In Network  3. Stage 3a chronic kidney disease (HCC)- stable, continue to monitor  -     Basic Metabolic Panel (8); Future; Expected date: 10/01/2025  4. Dyslipidemia- well controlled on atorvastatin, CPM  5. Other specified hypothyroidism- TSH slightly low with normal free t4 and t3, will recheck levels in October. No change to levothyroxine dose at this time  -     TSH and Free T4; Future; Expected date: 10/01/2025    Assessment & Plan           Return in about 4 months (around 10/14/2025), or if symptoms worsen or fail to improve, for follow up chronic issues.      Kat Banerjee MD, 6/26/2025, 12:42 PM             [1]   Social  History  Tobacco Use   Smoking Status Former    Current packs/day: 0.00    Average packs/day: 1 pack/day for 1 year (1.0 ttl pk-yrs)    Types: Cigarettes    Quit date: 1985    Years since quittin.5    Passive exposure: Past   Smokeless Tobacco Never   Tobacco Comments    Smoked off and on for20 years Has been 35 years quit.   [2]   Current Outpatient Medications   Medication Sig Dispense Refill    atorvastatin 40 MG Oral Tab Take 1 tablet (40 mg total) by mouth daily. 90 tablet 3    DULoxetine 60 MG Oral Cap DR Particles TAKE 1 CAPSULE(60 MG) BY MOUTH EVERY DAY 90 capsule 3    Glucose Blood (ONETOUCH VERIO) In Vitro Strip 1 strip by In Vitro route daily. 100 strip 1    OneTouch Delica Lancets 33G Does not apply Misc 1 Lancet by Finger stick route daily. 100 each 1    diphenhydrAMINE-APAP, sleep, (TYLENOL PM EXTRA STRENGTH OR) Take 1 tablet by mouth every evening.      gabapentin 300 MG Oral Cap Take 1 capsule (300 mg total) by mouth 3 (three) times daily.      metoprolol succinate ER 50 MG Oral Tablet 24 Hr Take 1 tablet (50 mg total) by mouth daily.      cholecalciferol 50 MCG (2000 UT) Oral Tab Take 1 tablet (2,000 Units total) by mouth daily.      metFORMIN 500 MG Oral Tab Take 1 tablet (500 mg total) by mouth 2 (two) times daily with meals. (Patient taking differently: Take 1 tablet (500 mg total) by mouth daily with breakfast.) 180 tablet 3    levothyroxine 88 MCG Oral Tab Take 1 tablet (88 mcg total) by mouth before breakfast. Repeat labs in 2 months 90 tablet 3    dapagliflozin 10 MG Oral Tab Take 5 mg by mouth daily.      spironolactone 25 MG Oral Tab Take 0.5 tablets (12.5 mg total) by mouth daily.      furosemide 20 MG Oral Tab TAKE 1/2 TABLET BY MOUTH DAILY AS NEEDED FOR SWELLING OR WEIGHT GAIN      latanoprost (XALATAN) 0.005 % Ophthalmic Solution Place 1 drop into both eyes nightly.  2    Aspirin 81 MG Oral Tab Take 1 tablet (81 mg total) by mouth daily.

## 2025-07-12 DIAGNOSIS — I50.32 CHRONIC HEART FAILURE WITH PRESERVED EJECTION FRACTION (HFPEF)  (CMD): ICD-10-CM

## 2025-07-14 RX ORDER — SPIRONOLACTONE 25 MG/1
12.5 TABLET ORAL DAILY
Qty: 45 TABLET | Refills: 3 | Status: SHIPPED | OUTPATIENT
Start: 2025-07-14

## 2025-07-16 RX ORDER — LEVOTHYROXINE SODIUM 88 UG/1
TABLET ORAL
Qty: 90 TABLET | Refills: 1 | Status: SHIPPED | OUTPATIENT
Start: 2025-07-16

## 2025-07-16 NOTE — TELEPHONE ENCOUNTER
Spoke with [facility] pharmacy f/u on pt's refill for Levothyroxine 88mcg.  LOV 6/26/25  Last refill  Medication Quantity Refills Start End   levothyroxine 88 MCG Oral Tab 90 tablet 3 7/24/2024 --   Sig:   Take 1 tablet (88 mcg total) by mouth before breakfast. Repeat labs in 2 months       No future appointments.    Informed will relay to PCP rx request.   Rx pended for your approval if ok.

## 2025-07-21 RX ORDER — FUROSEMIDE 20 MG/1
20 TABLET ORAL DAILY PRN
Qty: 90 TABLET | Refills: 1 | Status: SHIPPED | OUTPATIENT
Start: 2025-07-21

## 2025-08-25 ENCOUNTER — ANCILLARY PROCEDURE (OUTPATIENT)
Dept: CARDIOLOGY | Age: 85
End: 2025-08-25
Attending: INTERNAL MEDICINE

## 2025-09-23 ENCOUNTER — APPOINTMENT (OUTPATIENT)
Dept: NEUROLOGY | Age: 85
End: 2025-09-23

## 2025-11-03 ENCOUNTER — APPOINTMENT (OUTPATIENT)
Dept: CARDIOLOGY | Age: 85
End: 2025-11-03
Attending: INTERNAL MEDICINE

## 2025-11-14 ENCOUNTER — APPOINTMENT (OUTPATIENT)
Dept: CARDIOLOGY | Age: 85
End: 2025-11-14

## 2026-03-19 ENCOUNTER — APPOINTMENT (OUTPATIENT)
Dept: PAIN MANAGEMENT | Age: 86
End: 2026-03-19

## (undated) DEVICE — SPONGE 4X4 10PK

## (undated) DEVICE — DISPOSABLE TOURNIQUET CUFF SINGLE BLADDER, DUAL PORT AND QUICK CONNECT CONNECTOR: Brand: COLOR CUFF

## (undated) DEVICE — DRAPE STERI 1000 UTIL AD

## (undated) DEVICE — GLOVE SUR 7.5 SENSICARE PI PIP CRM PWD F

## (undated) DEVICE — 3M™ STERI-STRIP™ REINFORCED ADHESIVE SKIN CLOSURES, R1547, 1/2 IN X 4 IN (12 MM X 100 MM), 6 STRIPS/ENVELOPE: Brand: 3M™ STERI-STRIP™

## (undated) DEVICE — KIT,ANTI FOG,W/SPONGE & FLUID,SOFT PACK: Brand: MEDLINE

## (undated) DEVICE — Device

## (undated) DEVICE — UPPER EXTREMITY CDS-LF: Brand: MEDLINE INDUSTRIES, INC.

## (undated) DEVICE — MINI-BLADE®: Brand: BEAVER®

## (undated) DEVICE — GOWN,SIRUS,FABRIC-REINFORCED,X-LARGE: Brand: MEDLINE

## (undated) DEVICE — ANTISEPTIC 4OZ 70% ISO ALC

## (undated) DEVICE — ADHESIVE SKIN TOP FOR WND CLSR DERMBND ADV

## (undated) DEVICE — SUT MCRYL 5-0 18IN P-3 ABSRB UD 13MM 3/8 CIR

## (undated) DEVICE — SOLUTION IRRIG 1000ML 0.9% NACL USP BTL

## (undated) DEVICE — STOCKINETTE,DOUBLE PLY,4X54,STERILE: Brand: MEDLINE

## (undated) DEVICE — LOW PROFILE (LP) BLADE ASSEMBLY 6PK: Brand: MICROAIRE®

## (undated) DEVICE — 3M™ TEGADERM™ +PAD FILM DRESSING WITH NON-ADHERENT PAD, 3587, 3-1/2 IN X 4-1/8 IN (9 CM X 10.5 CM), 25/CAR, 4 CAR/CS: Brand: 3M™ TEGADERM™

## (undated) DEVICE — DISPOSABLE BIPOLAR FORCEPS 4" (10.2CM) JEWELERS, STRAIGHT 0.4MM TIP AND 12 FT. (3.6M) CABLE: Brand: KIRWAN

## (undated) DEVICE — PADDING CAST W4INXL4YD ST COHESIVE LP

## (undated) DEVICE — SOLUTION RUBBING 4OZ 70% ISO ALC CLR

## (undated) DEVICE — SUT MCRYL 4-0 18IN P-3 ABSRB UD 13MM 3/8 CIR

## (undated) DEVICE — GLOVE SUR 8 SENSICARE PI PIP GRN PWD F

## (undated) NOTE — MR AVS SNAPSHOT
[address] [facility] [address]  [facility] [id number] [phone]               Thank you for choosing us for your health care visit with [facility] N[facility].   We are glad to serve you and happy to provide you with this Commonly known as:  CYMBALTA           ergocalciferol 21180 units Caps   Take 1 capsule (50,000 Units total) by mouth once a week.    Commonly known as:  DRISDOL/VITAMIN D2           latanoprost 0.005 % Soln   Commonly known as:  Denisse Fernandez

## (undated) NOTE — LETTER
OUTSIDE TESTING RESULT REQUEST     IMPORTANT: FOR YOUR IMMEDIATE ATTENTION  Please FAX all test results listed below to: [phone]     Testing already done on or about: 25     * * * * If testing is NOT complete, arrange with patient A.S.A.P. * * * *      Patient Name: Sofiya Blas  Surgery Date: 2025  Medical Record: [id number]  CSN: [id number]  : 1940 - A: 84 y     Sex: female  Surgeon(s):  Lm Gallegos MD  Procedure: left endoscopic carpal tunnel release, possible open  Anesthesia Type: MAC     Surgeon: Lm Gallegos MD     The following Testing and Time Line are REQUIRED PER ANESTHESIA     EKG READ AND SIGNED WITHIN   90 days  BMP (requires 4 hour fast) within  60 days      Thank You,   Sent by: Barbra MERINO

## (undated) NOTE — IP AVS SNAPSHOT
[address]            (For Outpatient Use Only) Initial Admit Date: 5/16/2018   Inpt/Obs Admit Date: Inpt: N/A / Obs: N/A   Discharge Date:    Hospital Acct:  [de-identified]   MRN: [de-identified]   CSN: [id number]        ENCOUNTER  Patient Cla Subscriber Name:  Jaswinder Jackson :    Subscriber ID:  Pt Rel to Subscriber:    Hospital Account Financial Class: Medicare    May 17, 2018

## (undated) NOTE — LETTER
[employer] Cardiac Device Communication Tool    Preop to complete    Patient Name Sofiya Blas   Patient  - AGE - SEX 1940 - A: 84 y - female   Surgical Date 2024   Surgical Procedure Right endoscopic carpal tunnel release, possible open   Surgical Location [facility]   Type of cautery anticipated: Monopolar   SUPINE       Device Clinic to Complete the Information Below, Sign and Fax to [phone]    Pacemaker or ICD    Atrial or atrial-ventricular lead?        Indication for device    Is patient pacemaker dependent?    Has pt had routine f/u and is battery life > 3 months    Is ICD programmed to inhibit therapy w/magnet?    Does device have rate response or other sensor?      Surgical Procedure above Iliac Crest Surgical Procedure @ [facility] and below   < 6 in. from ICD: Reprogram therapies OFF w/  asynchronous pacing if PM dependent  < 6 in. from PM: Reprogram asynchronous if PM   dependent ICD: No Change  PM: No Change   > 6 in. from ICD: Magnet*  > 6 in. from PM:  No Change*  * If PM dependent observe for pacing inhibition and minimize cautery if inhibition is seen                                              Bipolar cautery: No Change   Cardiac Device Management Plan (check one)            ___  Reprogram (PAT Dept to provide Rep w/ arrival date/time)   ___ Magnet    ___ No Change    Comments: ___________________________________________________________________        Signature: ________________________________ Date: ____________ Time: ______________     Print Name: ___________________________________

## (undated) NOTE — LETTER
[employer] Cardiac Device Communication Tool    Preop to complete    Patient Name Sofiya Blas   Patient  - AGE - SEX 1940 - A: 84 y - female   Surgical Date 2024   Surgical Procedure Right endoscopic carpal tunnel release, possible open   Surgical Location [facility]   Type of cautery anticipated: Monopolar         Device Clinic to Complete the Information Below, Sign and Fax to [phone]    Pacemaker or ICD    Atrial or atrial-ventricular lead?        Indication for device    Is patient pacemaker dependent?    Has pt had routine f/u and is battery life > 3 months    Is ICD programmed to inhibit therapy w/magnet?    Does device have rate response or other sensor?      Surgical Procedure above Ili[facility] Crest Surgical Procedure @ Iliac Crest and below   < 6 in. from ICD: Reprogram therapies OFF w/  asynchronous pacing if PM dependent  < 6 in. from PM: Reprogram asynchronous if PM   dependent ICD: No Change  PM: No Change   > 6 in. from ICD: Magnet*  > 6 in. from PM:  No Change*  * If PM dependent observe for pacing inhibition and minimize cautery if inhibition is seen                                              Bipolar cautery: No Change   Cardiac Device Management Plan (check one)            ___  Reprogram (PAT Dept to provide Rep w/ arrival date/time)   ___ Magnet    ___ No Change    Comments: ___________________________________________________________________        Signature: ________________________________ Date: ____________ Time: ______________     Print Name: ___________________________________

## (undated) NOTE — LETTER
Patient Name: Amelia Vasquez        : 1940       Medical Record #: [id number]    CONSENT FOR PROCEDURES/SEDATION    Date: 2020       Time: 1:07 PM        1.  I authorize the performance upon Sofiya Ruiz the following:  left med

## (undated) NOTE — MR AVS SNAPSHOT
[facility] [address]  [address] [facility] [place]  [phone]               Thank you for choosing us for your health care visit with Harriet Grullon MD.  We are glad to serve you and happy to provide you with t atorvastatin 40 MG Tabs   TAKE 1 TABLET BY MOUTH ONE TIME A DAY   Commonly known as:  LIPITOR           DULoxetine HCl 60 MG Cpep   TAKE ONE CAPSULE BY MOUTH EVERY DAY   What changed:  Another medication with the same name was removed.  Continue taking thi

## (undated) NOTE — LETTER
Patient Name: Manny Yancey        : 1940       Medical Record #: [id number]    CONSENT FOR PROCEDURES/SEDATION    Date: 2021       Time: 8:00 AM        1.  I authorize the performance upon Sofiya Arroyo the following:    _____

## (undated) NOTE — LETTER
Patient Name: Joce Watts        : 1940       Medical Record #: [id number]    CONSENT FOR PROCEDURES/SEDATION    Date: 2019       Time: 12:57 PM        1.  I authorize the performance upon Sofiya Ghotra the following:    Left

## (undated) NOTE — Clinical Note
OUTSIDE TESTING RESULT REQUEST     IMPORTANT: FOR YOUR IMMEDIATE ATTENTION  Please FAX all test results listed below to: [phone]     Testing already done on or about: ***     * * * * If testing is NOT complete, arrange with patient A.S.A.P. * * * *      Patient Name: Sofiya Blas  Surgery Date: 2025  Medical Record: [id number]  CSN: [id number]  : 1940 - A: 84 y     Sex: female  Surgeon(s):  Lm Gallegos MD  Procedure: left endoscopic carpal tunnel release, possible open  Anesthesia Type: MAC     Surgeon: Lm Gallegos MD     The following Testing and Time Line are REQUIRED PER ANESTHESIA     {EDW PAT SENDOUT:0548}      Thank You,   Sent by:***

## (undated) NOTE — LETTER
12/07/20        Sofiya Hungland Jasbir  [address] [address]      Dear Jenn Barry,    Our records indicate that you have outstanding lab work and or testing that was ordered for you and has not yet been completed:  Meek Juan

## (undated) NOTE — LETTER
[employer] Cardiac Device Communication Tool    Preop to complete    Patient Name Sofiya Blas   Patient  - AGE - SEX 1940 - A: 84 y - female   Surgical Date 2024   Surgical Procedure Right endoscopic carpal tunnel release, possible open   Surgical Location [facility]   Type of cautery anticipated: Monopolar         Device Clinic to Complete the Information Below, Sign and Fax to [phone]    Pacemaker or ICD    Atrial or atrial-ventricular lead?        Indication for device    Is patient pacemaker dependent?    Has pt had routine f/u and is battery life > 3 months    Is ICD programmed to inhibit therapy w/magnet?    Does device have rate response or other sensor?      Surgical Procedure above Iliac Crest Surgical Procedure @ Iliac Crest and below   < 6 in. from ICD: Reprogram therapies OFF w/  asynchronous pacing if PM dependent  < 6 in. from PM: Reprogram asynchronous if PM   dependent ICD: No Change  PM: No Change   > 6 in. from ICD: Magnet*  > 6 in. from PM:  No Change*  * If PM dependent observe for pacing inhibition and minimize cautery if inhibition is seen                                              Bipolar cautery: No Change   Cardiac Device Management Plan (check one)            ___  Reprogram (PAT Dept to provide Rep w/ arrival date/time)   ___ Magnet    ___ No Change    Comments: ___________________________________________________________________        Signature: ________________________________ Date: ____________ Time: ______________     Print Name: ___________________________________

## (undated) NOTE — MR AVS SNAPSHOT
[facility] [address]  [facility] [address] [phone]               Thank you for choosing us for your health care visit with [facility] 20 NURSE.   We are glad to serve you and happy to provide you with this Take 1 capsule (50,000 Units total) by mouth once a week.    Commonly known as:  DRISDOL/VITAMIN D2           latanoprost 0.005 % Soln   Commonly known as:  XALATAN           Levothyroxine Sodium 112 MCG Tabs   Take 1 tablet (112 mcg total) by mouth once da

## (undated) NOTE — MR AVS SNAPSHOT
[facility] [address]  [facility] [id number] [phone]               Thank you for choosing us for your health care visit with [facility] 20 NURSE.   We are glad to serve you and happy to provide you with this Levothyroxine Sodium 112 MCG Tabs   TAKE 1 TABLET BY MOUTH EVERY DAY   Commonly known as:  SYNTHROID, LEVOTHROID           TYLENOL PM EXTRA STRENGTH  MG Tabs   Generic drug:  Diphenhydramine-APAP (sleep)   Take 1 Tab by mouth nightly.

## (undated) NOTE — LETTER
OUTSIDE TESTING RESULT REQUEST     IMPORTANT: FOR YOUR IMMEDIATE ATTENTION  Please FAX all test results listed below to: [phone]     Testing already done on or about:      * * * * If testing is NOT complete, arrange with patient A.S.A.P. * * * *      Patient Name: Sofiya Blas  Surgery Date: 2025  Medical Record: [id number]  CSN: [id number]  : 1940 - A: 84 y     Sex: female  Surgeon(s):  Lm Gallegos MD  Procedure: left endoscopic carpal tunnel release, possible open  Anesthesia Type: MAC     Surgeon: Lm Gallegos MD     The following Testing and Time Line are REQUIRED PER ANESTHESIA     BMP (requires 4 hour fast) within  60 days      Thank You,   Sent by: Joan MODI

## (undated) NOTE — LETTER
03/01/21        Sofiya Martell  [address] [address]      Dear Franck Herron,    Our records indicate that you have outstanding lab work and or testing that was ordered for you and has not yet been completed:  Meek Singh

## (undated) NOTE — LETTER
OUTSIDE TESTING RESULT REQUEST     IMPORTANT: FOR YOUR IMMEDIATE ATTENTION  Please FAX all test results listed below to: [phone]     Testing already done on or about: 24     * * * * If testing is NOT complete, arrange with patient A.S.A.P. * * * *      Patient Name: Sofiya Blas  Surgery Date: 2024  Medical Record: [id number]  CSN: [id number]  : 1940 - A: 84 y     Sex: female  Surgeon(s):  Lm Gallegos MD  Procedure: Rright endoscopic carpal tunnel release, possible open  Anesthesia Type: MAC     Surgeon: Lm Gallegos MD     The following Testing and Time Line are REQUIRED PER ANESTHESIA     EKG READ AND SIGNED WITHIN   90 days      Thank You,   Sent by:PADMA MODI

## (undated) NOTE — LETTER
01/15/25      Orthopedic Surgery   Pre-Operative Clearance Request    Patient Name:   Sofiya Martell             :   1940    Surgeon: Dr. Gallegos             Date of Surgery: 2025    Surgical Procedure: left endoscopic carpal tunnel release, possible open       MUST COMPLETE ALL OF THE FOLLOWING 2-3 WEEKS PRIOR TO YOUR SURGERY TO AVOID CANCELLATION, DUE TO THE RULE THEIR WILL BE NO EXCEPTIONS!      [x]  History and Physical      [x]  Medical  Clearance                                              **Please fax test results, H&P, and clearance to [phone] and to P.A.T at [phone]**

## (undated) NOTE — LETTER
24      Orthopedic Surgery   Pre-Operative Clearance Request    Patient Name:   Sofiya Martell             :   1940    Surgeon: Dr. Gallegos             Date of Surgery: 2024    Surgical Procedure: right endoscopic carpal tunnel release, possible open       MUST COMPLETE ALL OF THE FOLLOWING 2-3 WEEKS PRIOR TO YOUR SURGERY TO AVOID CANCELLATION, DUE TO THE RULE THEIR WILL BE NO EXCEPTIONS!      [x]  History and Physical      [x]  Medical  Clearance                                  **Please fax test results, H&P, and clearance to [phone] and to P.A.T at [phone]**

## (undated) NOTE — LETTER
[facility] [address], [address]  Consent for Procedure/Sedation    Date:        Time:       1.  I authorize the performance upon Sofiya Loredo Span the following:  Single Chamber Automatic Internal Cardiac Defibrillator Implan ________________________________    ___________________    Witness: _________________________      Date: ___________________    Printed: 2018   10:05 AM  Patient Name: Tereso Zuleta        : 1940       Medical Record #: [id number]

## (undated) NOTE — LETTER
Patient Name: Corinna Fan        : 1940       Medical Record #: [id number]    CONSENT FOR PROCEDURES/SEDATION    Date: 2019       Time: 1:19 PM        1.  I authorize the performance upon Sofiya Suarez the following:    Right

## (undated) NOTE — LETTER
Patient Name: Erik Duong        : 1940       Medical Record #: [id number]    CONSENT FOR PROCEDURES/SEDATION    Date: 2020       Time: 3:21 PM        1.  I authorize the performance upon Sofiya Li the following:    Hema

## (undated) NOTE — MR AVS SNAPSHOT
[facility] [address]  [facility] [address] [phone]               Thank you for choosing us for your health care visit with EMG 20 NURSE.   We are glad to serve you and happy to provide you with this Take 1 capsule (50,000 Units total) by mouth once a week.    Commonly known as:  DRISDOL/VITAMIN D2           latanoprost 0.005 % Soln   Commonly known as:  XALATAN           Levothyroxine Sodium 112 MCG Tabs   TAKE 1 TABLET(112 MCG) BY MOUTH EVERY DAY   Co

## (undated) NOTE — MR AVS SNAPSHOT
[facility] [address]  [facility] [address] [phone]               Thank you for choosing us for your health care visit with EMG 20 NURSE.   We are glad to serve you and happy to provide you with this Take 1 capsule (50,000 Units total) by mouth once a week.    Commonly known as:  DRISDOL/VITAMIN D2           latanoprost 0.005 % Soln   Commonly known as:  XALATAN           Levothyroxine Sodium 112 MCG Tabs   TAKE 1 TABLET(112 MCG) BY MOUTH EVERY DAY   Co

## (undated) NOTE — LETTER
[employer] Cardiac Device Communication Tool    Preop to complete    Patient Name Sofiay Blas   Patient  - AGE - SEX 1940 - A: 84 y - female   Surgical Date 2025   Surgical Procedure left endoscopic carpal tunnel release, possible open   Surgical Location [facility]   Type of cautery anticipated: Monopolar   Surgical procedure > 2 hours      Device Clinic to Complete the Information Below, Sign and Fax to [phone]    Pacemaker or ICD    Atrial or atrial-ventricular lead?        Indication for device    Is patient pacemaker dependent?    Has pt had routine f/u and is battery life > 3 months    Is ICD programmed to inhibit therapy w/magnet?    Does device have rate response or other sensor?      Surgical Procedure above Iliac Crest Surgical Procedure @ Iliac Crest and below   < 6 in. from ICD: Reprogram therapies OFF w/  asynchronous pacing if PM dependent  < 6 in. from PM: Reprogram asynchronous if PM   dependent ICD: No Change  PM: No Change   > 6 in. from ICD: Magnet*  > 6 in. from PM:  No Change*  * If PM dependent observe for pacing inhibition and minimize cautery if inhibition is seen                                              Bipolar cautery: No Change   Cardiac Device Management Plan (check one)            ___  Reprogram (PAT Dept to provide Rep w/ arrival date/time)   ___ Magnet    ___ No Change    Comments: ___________________________________________________________________        Signature: ________________________________ Date: ____________ Time: ______________     Print Name: ___________________________________

## (undated) NOTE — LETTER
Patient Name: Ken Henderson        : 1940       Medical Record #: [id number]    CONSENT FOR PROCEDURES/SEDATION    Date: 12/3/2020       Time: 2:07 PM        1.  I authorize the performance upon Sofiya Adler the following:    _____

## (undated) NOTE — LETTER
12/04/17        Sofiya Pedro Chinqu[place] Jasbir  [address] [address]      Dear Jenn Barry,    Our records indicate that you have outstanding lab work and or testing that was ordered for you and has not yet been completed:          Vitamin D

## (undated) NOTE — ED AVS SNAPSHOT
[address]   MRN: [id number]    Department:  [facility] in [place]   Date of Visit:  10/20/2017           Disclosure     Insurance plans vary and the physician(s) referred by the ER may not be covered by your plan.  Please If you have been prescribed any medication(s), please fill your prescription right away and begin taking the medication(s) as directed    If the emergency physician has read X-rays, these will be re-interpreted by a radiologist.  If there is a significant